# Patient Record
Sex: MALE | Race: WHITE | NOT HISPANIC OR LATINO | Employment: FULL TIME | URBAN - METROPOLITAN AREA
[De-identification: names, ages, dates, MRNs, and addresses within clinical notes are randomized per-mention and may not be internally consistent; named-entity substitution may affect disease eponyms.]

---

## 2023-03-13 DIAGNOSIS — F98.8 ATTENTION DEFICIT DISORDER (ADD) WITHOUT HYPERACTIVITY: Primary | ICD-10-CM

## 2023-03-13 RX ORDER — GABAPENTIN 300 MG/1
CAPSULE ORAL
COMMUNITY
Start: 2022-01-11 | End: 2023-08-14 | Stop reason: SDUPTHER

## 2023-03-13 RX ORDER — DEXTROAMPHETAMINE SACCHARATE, AMPHETAMINE ASPARTATE MONOHYDRATE, DEXTROAMPHETAMINE SULFATE AND AMPHETAMINE SULFATE 5; 5; 5; 5 MG/1; MG/1; MG/1; MG/1
20 CAPSULE, EXTENDED RELEASE ORAL DAILY
Qty: 30 CAPSULE | Refills: 0 | Status: SHIPPED | OUTPATIENT
Start: 2023-03-13 | End: 2023-04-12 | Stop reason: SDUPTHER

## 2023-03-13 RX ORDER — DEXTROAMPHETAMINE SACCHARATE, AMPHETAMINE ASPARTATE MONOHYDRATE, DEXTROAMPHETAMINE SULFATE AND AMPHETAMINE SULFATE 5; 5; 5; 5 MG/1; MG/1; MG/1; MG/1
1 CAPSULE, EXTENDED RELEASE ORAL DAILY
COMMUNITY
Start: 2023-02-09 | End: 2023-03-13 | Stop reason: SDUPTHER

## 2023-03-13 RX ORDER — BENZONATATE 100 MG/1
100 CAPSULE ORAL 3 TIMES DAILY PRN
COMMUNITY
Start: 2022-10-07 | End: 2023-11-08

## 2023-03-13 RX ORDER — HYDROCORTISONE 25 MG/G
CREAM TOPICAL
COMMUNITY
Start: 2021-05-20

## 2023-03-13 RX ORDER — KETOCONAZOLE 20 MG/ML
SHAMPOO, SUSPENSION TOPICAL
COMMUNITY
Start: 2023-02-27

## 2023-03-13 RX ORDER — DULOXETIN HYDROCHLORIDE 30 MG/1
30 CAPSULE, DELAYED RELEASE ORAL DAILY
COMMUNITY
End: 2023-04-28 | Stop reason: SDUPTHER

## 2023-03-13 RX ORDER — ROSUVASTATIN CALCIUM 10 MG/1
10 TABLET, COATED ORAL DAILY
COMMUNITY
End: 2024-01-05 | Stop reason: SDUPTHER

## 2023-04-12 DIAGNOSIS — F98.8 ATTENTION DEFICIT DISORDER (ADD) WITHOUT HYPERACTIVITY: ICD-10-CM

## 2023-04-12 RX ORDER — DEXTROAMPHETAMINE SACCHARATE, AMPHETAMINE ASPARTATE MONOHYDRATE, DEXTROAMPHETAMINE SULFATE AND AMPHETAMINE SULFATE 5; 5; 5; 5 MG/1; MG/1; MG/1; MG/1
20 CAPSULE, EXTENDED RELEASE ORAL DAILY
Qty: 30 CAPSULE | Refills: 0 | Status: SHIPPED | OUTPATIENT
Start: 2023-04-12 | End: 2023-04-14 | Stop reason: SDUPTHER

## 2023-04-12 RX ORDER — OXYCODONE AND ACETAMINOPHEN 5; 325 MG/1; MG/1
1 TABLET ORAL EVERY 6 HOURS
Qty: 20 TABLET | Refills: 0 | COMMUNITY
Start: 2023-03-31 | End: 2023-04-05

## 2023-04-14 DIAGNOSIS — F98.8 ATTENTION DEFICIT DISORDER (ADD) WITHOUT HYPERACTIVITY: ICD-10-CM

## 2023-04-14 RX ORDER — DEXTROAMPHETAMINE SACCHARATE, AMPHETAMINE ASPARTATE MONOHYDRATE, DEXTROAMPHETAMINE SULFATE AND AMPHETAMINE SULFATE 5; 5; 5; 5 MG/1; MG/1; MG/1; MG/1
20 CAPSULE, EXTENDED RELEASE ORAL DAILY
Qty: 30 CAPSULE | Refills: 0 | Status: SHIPPED | OUTPATIENT
Start: 2023-04-14 | End: 2023-05-12 | Stop reason: SDUPTHER

## 2023-04-28 ENCOUNTER — TELEPHONE (OUTPATIENT)
Dept: PRIMARY CARE | Facility: CLINIC | Age: 58
End: 2023-04-28
Payer: COMMERCIAL

## 2023-04-28 DIAGNOSIS — F41.9 ANXIETY AND DEPRESSION: Primary | ICD-10-CM

## 2023-04-28 DIAGNOSIS — F32.A ANXIETY AND DEPRESSION: Primary | ICD-10-CM

## 2023-04-28 RX ORDER — DULOXETIN HYDROCHLORIDE 30 MG/1
30 CAPSULE, DELAYED RELEASE ORAL DAILY
Qty: 90 CAPSULE | Refills: 3 | Status: SHIPPED | OUTPATIENT
Start: 2023-04-28 | End: 2023-05-03 | Stop reason: SDUPTHER

## 2023-05-03 DIAGNOSIS — F41.9 ANXIETY AND DEPRESSION: ICD-10-CM

## 2023-05-03 DIAGNOSIS — F32.A ANXIETY AND DEPRESSION: ICD-10-CM

## 2023-05-03 RX ORDER — DULOXETIN HYDROCHLORIDE 30 MG/1
30 CAPSULE, DELAYED RELEASE ORAL DAILY
Qty: 90 CAPSULE | Refills: 3 | Status: SHIPPED | OUTPATIENT
Start: 2023-05-03 | End: 2024-05-28 | Stop reason: SDUPTHER

## 2023-05-12 ENCOUNTER — TELEPHONE (OUTPATIENT)
Dept: PRIMARY CARE | Facility: CLINIC | Age: 58
End: 2023-05-12
Payer: COMMERCIAL

## 2023-05-12 DIAGNOSIS — F98.8 ATTENTION DEFICIT DISORDER (ADD) WITHOUT HYPERACTIVITY: ICD-10-CM

## 2023-05-12 RX ORDER — DEXTROAMPHETAMINE SACCHARATE, AMPHETAMINE ASPARTATE MONOHYDRATE, DEXTROAMPHETAMINE SULFATE AND AMPHETAMINE SULFATE 5; 5; 5; 5 MG/1; MG/1; MG/1; MG/1
20 CAPSULE, EXTENDED RELEASE ORAL DAILY
Qty: 15 CAPSULE | Refills: 0 | Status: SHIPPED | OUTPATIENT
Start: 2023-05-12 | End: 2023-06-01 | Stop reason: SDUPTHER

## 2023-05-12 NOTE — TELEPHONE ENCOUNTER
Pt called and made an appointment for his adderall renewal and he will be in on 5/25 at 4:45. Pt will run out of his adderall by then and would like to know if you could give him enough till his appointment

## 2023-05-24 PROBLEM — K64.4 EXTERNAL HEMORRHOIDS: Status: ACTIVE | Noted: 2023-05-24

## 2023-05-24 PROBLEM — K64.8 INTERNAL HEMORRHOIDS: Status: ACTIVE | Noted: 2023-05-24

## 2023-05-24 PROBLEM — G54.2 CERVICAL NEUROPATHY: Status: ACTIVE | Noted: 2023-05-24

## 2023-05-24 PROBLEM — B96.89 ACUTE BACTERIAL SINUSITIS: Status: ACTIVE | Noted: 2023-05-24

## 2023-05-24 PROBLEM — J01.90 ACUTE BACTERIAL SINUSITIS: Status: ACTIVE | Noted: 2023-05-24

## 2023-05-24 PROBLEM — S93.409A SPRAIN OF ANKLE: Status: ACTIVE | Noted: 2023-05-24

## 2023-05-24 PROBLEM — E78.5 HYPERLIPIDEMIA: Status: ACTIVE | Noted: 2023-05-24

## 2023-05-24 PROBLEM — F41.8 DEPRESSION WITH ANXIETY: Status: ACTIVE | Noted: 2023-05-24

## 2023-05-24 PROBLEM — J34.89 LESION OF NOSE: Status: ACTIVE | Noted: 2023-05-24

## 2023-05-24 PROBLEM — S39.012A STRAIN OF LUMBAR REGION: Status: ACTIVE | Noted: 2023-05-24

## 2023-05-24 PROBLEM — M75.42 IMPINGEMENT SYNDROME OF BOTH SHOULDERS: Status: ACTIVE | Noted: 2023-05-24

## 2023-05-24 PROBLEM — F98.8 ADD (ATTENTION DEFICIT DISORDER) WITHOUT HYPERACTIVITY: Status: ACTIVE | Noted: 2023-05-24

## 2023-05-24 PROBLEM — J02.9 SORE THROAT: Status: ACTIVE | Noted: 2023-05-24

## 2023-05-24 PROBLEM — M75.41 IMPINGEMENT SYNDROME OF BOTH SHOULDERS: Status: ACTIVE | Noted: 2023-05-24

## 2023-05-25 ENCOUNTER — APPOINTMENT (OUTPATIENT)
Dept: PRIMARY CARE | Facility: CLINIC | Age: 58
End: 2023-05-25
Payer: COMMERCIAL

## 2023-06-01 ENCOUNTER — OFFICE VISIT (OUTPATIENT)
Dept: PRIMARY CARE | Facility: CLINIC | Age: 58
End: 2023-06-01
Payer: COMMERCIAL

## 2023-06-01 VITALS
WEIGHT: 197.2 LBS | HEART RATE: 72 BPM | BODY MASS INDEX: 32.86 KG/M2 | HEIGHT: 65 IN | TEMPERATURE: 97.7 F | SYSTOLIC BLOOD PRESSURE: 130 MMHG | DIASTOLIC BLOOD PRESSURE: 78 MMHG | OXYGEN SATURATION: 97 %

## 2023-06-01 DIAGNOSIS — F98.8 ADD (ATTENTION DEFICIT DISORDER) WITHOUT HYPERACTIVITY: Primary | ICD-10-CM

## 2023-06-01 DIAGNOSIS — F98.8 ATTENTION DEFICIT DISORDER (ADD) WITHOUT HYPERACTIVITY: ICD-10-CM

## 2023-06-01 PROCEDURE — 99213 OFFICE O/P EST LOW 20 MIN: CPT | Performed by: FAMILY MEDICINE

## 2023-06-01 PROCEDURE — 80307 DRUG TEST PRSMV CHEM ANLYZR: CPT

## 2023-06-01 PROCEDURE — 1036F TOBACCO NON-USER: CPT | Performed by: FAMILY MEDICINE

## 2023-06-01 RX ORDER — ASPIRIN 81 MG/1
81 TABLET ORAL
COMMUNITY
End: 2023-11-08

## 2023-06-01 RX ORDER — DEXTROAMPHETAMINE SACCHARATE, AMPHETAMINE ASPARTATE MONOHYDRATE, DEXTROAMPHETAMINE SULFATE AND AMPHETAMINE SULFATE 5; 5; 5; 5 MG/1; MG/1; MG/1; MG/1
20 CAPSULE, EXTENDED RELEASE ORAL DAILY
Qty: 15 CAPSULE | Refills: 0 | Status: SHIPPED | OUTPATIENT
Start: 2023-06-01 | End: 2023-06-14 | Stop reason: SDUPTHER

## 2023-06-01 RX ORDER — FLUTICASONE PROPIONATE 50 MCG
1 SPRAY, SUSPENSION (ML) NASAL
COMMUNITY
End: 2023-11-08

## 2023-06-01 RX ORDER — DULOXETIN HYDROCHLORIDE 60 MG/1
60 CAPSULE, DELAYED RELEASE ORAL
COMMUNITY
End: 2023-11-08

## 2023-06-01 SDOH — ECONOMIC STABILITY: INCOME INSECURITY: IN THE LAST 12 MONTHS, WAS THERE A TIME WHEN YOU WERE NOT ABLE TO PAY THE MORTGAGE OR RENT ON TIME?: NO

## 2023-06-01 SDOH — ECONOMIC STABILITY: FOOD INSECURITY: WITHIN THE PAST 12 MONTHS, THE FOOD YOU BOUGHT JUST DIDN'T LAST AND YOU DIDN'T HAVE MONEY TO GET MORE.: NEVER TRUE

## 2023-06-01 SDOH — ECONOMIC STABILITY: FOOD INSECURITY: WITHIN THE PAST 12 MONTHS, YOU WORRIED THAT YOUR FOOD WOULD RUN OUT BEFORE YOU GOT MONEY TO BUY MORE.: NEVER TRUE

## 2023-06-01 SDOH — ECONOMIC STABILITY: HOUSING INSECURITY
IN THE LAST 12 MONTHS, WAS THERE A TIME WHEN YOU DID NOT HAVE A STEADY PLACE TO SLEEP OR SLEPT IN A SHELTER (INCLUDING NOW)?: NO

## 2023-06-01 SDOH — ECONOMIC STABILITY: TRANSPORTATION INSECURITY
IN THE PAST 12 MONTHS, HAS LACK OF TRANSPORTATION KEPT YOU FROM MEETINGS, WORK, OR FROM GETTING THINGS NEEDED FOR DAILY LIVING?: NO

## 2023-06-01 SDOH — ECONOMIC STABILITY: TRANSPORTATION INSECURITY
IN THE PAST 12 MONTHS, HAS THE LACK OF TRANSPORTATION KEPT YOU FROM MEDICAL APPOINTMENTS OR FROM GETTING MEDICATIONS?: NO

## 2023-06-01 ASSESSMENT — ENCOUNTER SYMPTOMS
DEPRESSION: 0
LOSS OF SENSATION IN FEET: 0
OCCASIONAL FEELINGS OF UNSTEADINESS: 0
SLEEP DISTURBANCE: 1

## 2023-06-01 ASSESSMENT — LIFESTYLE VARIABLES
AUDIT-C TOTAL SCORE: 0
HOW MANY STANDARD DRINKS CONTAINING ALCOHOL DO YOU HAVE ON A TYPICAL DAY: PATIENT DOES NOT DRINK
SKIP TO QUESTIONS 9-10: 1
HOW OFTEN DO YOU HAVE SIX OR MORE DRINKS ON ONE OCCASION: NEVER
HOW OFTEN DO YOU HAVE A DRINK CONTAINING ALCOHOL: NEVER

## 2023-06-01 ASSESSMENT — SOCIAL DETERMINANTS OF HEALTH (SDOH)
WITHIN THE LAST YEAR, HAVE YOU BEEN AFRAID OF YOUR PARTNER OR EX-PARTNER?: NO
WITHIN THE LAST YEAR, HAVE YOU BEEN HUMILIATED OR EMOTIONALLY ABUSED IN OTHER WAYS BY YOUR PARTNER OR EX-PARTNER?: NO
WITHIN THE LAST YEAR, HAVE TO BEEN RAPED OR FORCED TO HAVE ANY KIND OF SEXUAL ACTIVITY BY YOUR PARTNER OR EX-PARTNER?: NO
WITHIN THE LAST YEAR, HAVE YOU BEEN KICKED, HIT, SLAPPED, OR OTHERWISE PHYSICALLY HURT BY YOUR PARTNER OR EX-PARTNER?: NO
HOW HARD IS IT FOR YOU TO PAY FOR THE VERY BASICS LIKE FOOD, HOUSING, MEDICAL CARE, AND HEATING?: NOT HARD AT ALL

## 2023-06-01 ASSESSMENT — PAIN SCALES - GENERAL: PAINLEVEL: 6

## 2023-06-01 ASSESSMENT — PATIENT HEALTH QUESTIONNAIRE - PHQ9
2. FEELING DOWN, DEPRESSED OR HOPELESS: NOT AT ALL
1. LITTLE INTEREST OR PLEASURE IN DOING THINGS: NOT AT ALL
SUM OF ALL RESPONSES TO PHQ9 QUESTIONS 1 & 2: 0

## 2023-06-01 NOTE — PROGRESS NOTES
Subjective   Patient ID: Sivakumar Hall is a 58 y.o. male who presents for No chief complaint on file..   3 month med refills  HPI     Review of Systems   Psychiatric/Behavioral:  Positive for sleep disturbance.         ADD       Objective   There were no vitals taken for this visit.    Physical Exam  Vitals and nursing note reviewed.   Skin:     Findings: No lesion.   Neurological:      General: No focal deficit present.   Psychiatric:         Mood and Affect: Mood normal.         Behavior: Behavior normal.         Assessment/Plan   Problem List Items Addressed This Visit          Other    ADD (attention deficit disorder) without hyperactivity - Primary        OARRS:  No data recorded  I have personally reviewed the OARRS report for Siavkumar Hall. I have considered the risks of abuse, dependence, addiction and diversion    Is the patient prescribed a combination of a benzodiazepine and opioid?  No    Last Urine Drug Screen / ordered today: Yes  Recent Results (from the past 77388 hour(s))   Amphetamine Confirm, Urine    Collection Time: 05/20/21  4:31 PM   Result Value Ref Range    Amphetamines,Urine 2,183 ng/mL    MDA, Urine <200 ng/mL    MDEA, Urine <200 ng/mL    MDMA, Urine <200 ng/mL    Methamphetamine Quant, Ur <200 ng/mL    Phentermine,Urine <200 ng/mL   Drug Screen, Urine With Reflex to Confirmation    Collection Time: 05/20/21  4:31 PM   Result Value Ref Range    DRUG SCREEN COMMENT URINE SEE BELOW     Amphetamine Screen, Urine PRESUMPTIVE POSITIVE (A) NEGATIVE    Barbiturate Screen, Urine PRESUMPTIVE NEGATIVE NEGATIVE    BENZODIAZEPINE (PRESENCE) IN URINE BY SCREEN METHOD PRESUMPTIVE NEGATIVE NEGATIVE    Cannabinoid Screen, Urine PRESUMPTIVE NEGATIVE NEGATIVE    Cocaine Screen, Urine PRESUMPTIVE NEGATIVE NEGATIVE    Fentanyl, Ur PRESUMPTIVE NEGATIVE NEGATIVE    Methadone Screen, Urine PRESUMPTIVE NEGATIVE NEGATIVE    Opiate Screen, Urine PRESUMPTIVE NEGATIVE NEGATIVE    Oxycodone Screen, Ur PRESUMPTIVE  NEGATIVE NEGATIVE    PCP Screen, Urine PRESUMPTIVE NEGATIVE NEGATIVE     N/A    Controlled Substance Agreement:  Date of the Last Agreement: 6/1/23  Reviewed Controlled Substance Agreement including but not limited to the benefits, risks, and alternatives to treatment with a Controlled Substance medication(s).    Stimulants:   What is the patient's goal of therapy? ADD  Is this being achieved with current treatment? yes    Activities of Daily Living:   Is your overall impression that this patient is benefiting (symptom reduction outweighs side effects) from stimulant therapy? Yes     1. Physical Functioning: Better  2. Family Relationship: Better  3. Social Relationship: Better  4. Mood: Better  5. Sleep Patterns: Better  6. Overall Function: Better

## 2023-06-02 LAB
AMPHETAMINE (PRESENCE) IN URINE BY SCREEN METHOD: NORMAL
BARBITURATES PRESENCE IN URINE BY SCREEN METHOD: NORMAL
BENZODIAZEPINE (PRESENCE) IN URINE BY SCREEN METHOD: NORMAL
CANNABINOIDS IN URINE BY SCREEN METHOD: NORMAL
COCAINE (PRESENCE) IN URINE BY SCREEN METHOD: NORMAL
DRUG SCREEN COMMENT URINE: NORMAL
FENTANYL URINE: NORMAL
METHADONE (PRESENCE) IN URINE BY SCREEN METHOD: NORMAL
OPIATES (PRESENCE) IN URINE BY SCREEN METHOD: NORMAL
OXYCODONE (PRESENCE) IN URINE BY SCREEN METHOD: NORMAL
PHENCYCLIDINE (PRESENCE) IN URINE BY SCREEN METHOD: NORMAL

## 2023-06-14 DIAGNOSIS — F98.8 ATTENTION DEFICIT DISORDER (ADD) WITHOUT HYPERACTIVITY: ICD-10-CM

## 2023-06-15 RX ORDER — DEXTROAMPHETAMINE SACCHARATE, AMPHETAMINE ASPARTATE MONOHYDRATE, DEXTROAMPHETAMINE SULFATE AND AMPHETAMINE SULFATE 5; 5; 5; 5 MG/1; MG/1; MG/1; MG/1
20 CAPSULE, EXTENDED RELEASE ORAL DAILY
Qty: 30 CAPSULE | Refills: 0 | Status: SHIPPED | OUTPATIENT
Start: 2023-06-15 | End: 2023-08-18 | Stop reason: SDUPTHER

## 2023-07-27 RX ORDER — INFLUENZA VIRUS VACCINE 15; 15; 15; 15 UG/.5ML; UG/.5ML; UG/.5ML; UG/.5ML
SUSPENSION INTRAMUSCULAR
COMMUNITY
Start: 2022-10-23 | End: 2024-05-13

## 2023-07-27 RX ORDER — BNT162B2 ORIGINAL AND OMICRON BA.4/BA.5 .1125; .1125 MG/2.25ML; MG/2.25ML
INJECTION, SUSPENSION INTRAMUSCULAR
COMMUNITY
Start: 2022-10-23 | End: 2024-04-09

## 2023-08-14 DIAGNOSIS — M51.26 HERNIATED LUMBAR INTERVERTEBRAL DISC: Primary | ICD-10-CM

## 2023-08-14 RX ORDER — GABAPENTIN 300 MG/1
300 CAPSULE ORAL NIGHTLY
Qty: 90 CAPSULE | Refills: 0 | Status: SHIPPED | OUTPATIENT
Start: 2023-08-14 | End: 2023-11-14 | Stop reason: SDUPTHER

## 2023-08-18 ENCOUNTER — TELEPHONE (OUTPATIENT)
Dept: PRIMARY CARE | Facility: CLINIC | Age: 58
End: 2023-08-18

## 2023-08-18 ENCOUNTER — OFFICE VISIT (OUTPATIENT)
Dept: PRIMARY CARE | Facility: CLINIC | Age: 58
End: 2023-08-18
Payer: COMMERCIAL

## 2023-08-18 VITALS
HEART RATE: 61 BPM | HEIGHT: 65 IN | TEMPERATURE: 97.7 F | WEIGHT: 200.2 LBS | DIASTOLIC BLOOD PRESSURE: 72 MMHG | SYSTOLIC BLOOD PRESSURE: 122 MMHG | OXYGEN SATURATION: 98 % | BODY MASS INDEX: 33.36 KG/M2

## 2023-08-18 DIAGNOSIS — F98.8 ATTENTION DEFICIT DISORDER (ADD) WITHOUT HYPERACTIVITY: ICD-10-CM

## 2023-08-18 DIAGNOSIS — M48.02 CERVICAL STENOSIS OF SPINAL CANAL: ICD-10-CM

## 2023-08-18 DIAGNOSIS — M48.061 SPINAL STENOSIS OF LUMBAR REGION WITHOUT NEUROGENIC CLAUDICATION: ICD-10-CM

## 2023-08-18 DIAGNOSIS — F98.8 ADD (ATTENTION DEFICIT DISORDER) WITHOUT HYPERACTIVITY: Primary | ICD-10-CM

## 2023-08-18 PROCEDURE — 99214 OFFICE O/P EST MOD 30 MIN: CPT | Performed by: FAMILY MEDICINE

## 2023-08-18 PROCEDURE — 1036F TOBACCO NON-USER: CPT | Performed by: FAMILY MEDICINE

## 2023-08-18 RX ORDER — DEXTROAMPHETAMINE SACCHARATE, AMPHETAMINE ASPARTATE MONOHYDRATE, DEXTROAMPHETAMINE SULFATE AND AMPHETAMINE SULFATE 5; 5; 5; 5 MG/1; MG/1; MG/1; MG/1
20 CAPSULE, EXTENDED RELEASE ORAL DAILY
Qty: 30 CAPSULE | Refills: 0 | Status: SHIPPED | OUTPATIENT
Start: 2023-08-18 | End: 2023-08-18 | Stop reason: SDUPTHER

## 2023-08-18 RX ORDER — DEXTROAMPHETAMINE SACCHARATE, AMPHETAMINE ASPARTATE MONOHYDRATE, DEXTROAMPHETAMINE SULFATE AND AMPHETAMINE SULFATE 5; 5; 5; 5 MG/1; MG/1; MG/1; MG/1
20 CAPSULE, EXTENDED RELEASE ORAL DAILY
Qty: 30 CAPSULE | Refills: 0 | Status: SHIPPED | OUTPATIENT
Start: 2023-08-18 | End: 2023-09-25 | Stop reason: SDUPTHER

## 2023-08-18 ASSESSMENT — ENCOUNTER SYMPTOMS
BACK PAIN: 1
NECK PAIN: 1

## 2023-08-18 ASSESSMENT — PAIN SCALES - GENERAL: PAINLEVEL: 10-WORST PAIN EVER

## 2023-08-18 NOTE — TELEPHONE ENCOUNTER
Pt. Was just in & had refill on adderall 20 mg.  & [facility] did not have.  Can you please send to [facility] [phone]  ty

## 2023-08-18 NOTE — PROGRESS NOTES
"Subjective   Patient ID: Sivakumar Hall is a 58 y.o. male who presents for Follow-up (Follow up adhd med refills).   3 month med refills  HPI     Review of Systems   Musculoskeletal:  Positive for back pain and neck pain.   Psychiatric/Behavioral:          ADD       Objective   /72 (BP Location: Left arm)   Pulse 61   Temp 36.5 °C (97.7 °F) (Temporal)   Ht 1.651 m (5' 5\")   Wt 90.8 kg (200 lb 3.2 oz)   SpO2 98%   BMI 33.32 kg/m²     Physical Exam  Vitals and nursing note reviewed.   Musculoskeletal:      Comments: Cervical and lumbar stenosis.  Previous history of cervical spine surgery.   Neurological:      General: No focal deficit present.      Mental Status: He is oriented to person, place, and time.   Psychiatric:         Mood and Affect: Mood normal.         Behavior: Behavior normal.         Assessment/Plan   Problem List Items Addressed This Visit       ADD (attention deficit disorder) without hyperactivity - Primary     Other Visit Diagnoses       Cervical stenosis of spinal canal        Relevant Orders    XR cervical spine complete 4-5 views    Referral to Physical Therapy    Spinal stenosis of lumbar region without neurogenic claudication        Relevant Orders    XR lumbar spine complete 4+ views    Referral to Physical Therapy    Attention deficit disorder (ADD) without hyperactivity        Relevant Medications    amphetamine-dextroamphetamine XR (Adderall XR) 20 mg 24 hr capsule             OARRS:  Solo Andre DO on 8/18/2023 11:12 AM  I have personally reviewed the OARRS report for Sivakumar Hall. I have considered the risks of abuse, dependence, addiction and diversion    Is the patient prescribed a combination of a benzodiazepine and opioid?  No    Last Urine Drug Screen / ordered today: No  Recent Results (from the past 42438 hour(s))   Drug Screen, Urine With Reflex to Confirmation    Collection Time: 06/01/23  4:24 PM   Result Value Ref Range    DRUG SCREEN COMMENT URINE " SEE BELOW     Amphetamine Screen, Urine PRESUMPTIVE NEGATIVE NEGATIVE    Barbiturate Screen, Urine PRESUMPTIVE NEGATIVE NEGATIVE    BENZODIAZEPINE (PRESENCE) IN URINE BY SCREEN METHOD PRESUMPTIVE NEGATIVE NEGATIVE    Cannabinoid Screen, Urine PRESUMPTIVE NEGATIVE NEGATIVE    Cocaine Screen, Urine PRESUMPTIVE NEGATIVE NEGATIVE    Fentanyl, Ur PRESUMPTIVE NEGATIVE NEGATIVE    Methadone Screen, Urine PRESUMPTIVE NEGATIVE NEGATIVE    Opiate Screen, Urine PRESUMPTIVE NEGATIVE NEGATIVE    Oxycodone Screen, Ur PRESUMPTIVE NEGATIVE NEGATIVE    PCP Screen, Urine PRESUMPTIVE NEGATIVE NEGATIVE     Results are as expected.     Controlled Substance Agreement:  Date of the Last Agreement: 6/1/23  Reviewed Controlled Substance Agreement including but not limited to the benefits, risks, and alternatives to treatment with a Controlled Substance medication(s).    Stimulants:   What is the patient's goal of therapy? ADD  Is this being achieved with current treatment? yes    Activities of Daily Living:   Is your overall impression that this patient is benefiting (symptom reduction outweighs side effects) from stimulant therapy? Yes     1. Physical Functioning: Better  2. Family Relationship: Better  3. Social Relationship: Better  4. Mood: Better  5. Sleep Patterns: Better  6. Overall Function: Better

## 2023-09-12 NOTE — RESULT ENCOUNTER NOTE
Spoke with pt and pt states that his neck is killing him and that he does have PT appt and his back is not as bad.

## 2023-09-25 DIAGNOSIS — F98.8 ATTENTION DEFICIT DISORDER (ADD) WITHOUT HYPERACTIVITY: ICD-10-CM

## 2023-09-25 RX ORDER — DEXTROAMPHETAMINE SACCHARATE, AMPHETAMINE ASPARTATE MONOHYDRATE, DEXTROAMPHETAMINE SULFATE AND AMPHETAMINE SULFATE 5; 5; 5; 5 MG/1; MG/1; MG/1; MG/1
20 CAPSULE, EXTENDED RELEASE ORAL DAILY
Qty: 30 CAPSULE | Refills: 0 | Status: SHIPPED | OUTPATIENT
Start: 2023-09-25 | End: 2023-10-06 | Stop reason: SDUPTHER

## 2023-10-04 ENCOUNTER — APPOINTMENT (OUTPATIENT)
Dept: PHYSICAL THERAPY | Facility: CLINIC | Age: 58
End: 2023-10-04
Payer: COMMERCIAL

## 2023-10-06 DIAGNOSIS — F98.8 ATTENTION DEFICIT DISORDER (ADD) WITHOUT HYPERACTIVITY: ICD-10-CM

## 2023-10-06 RX ORDER — DEXTROAMPHETAMINE SACCHARATE, AMPHETAMINE ASPARTATE MONOHYDRATE, DEXTROAMPHETAMINE SULFATE AND AMPHETAMINE SULFATE 5; 5; 5; 5 MG/1; MG/1; MG/1; MG/1
20 CAPSULE, EXTENDED RELEASE ORAL DAILY
Qty: 30 CAPSULE | Refills: 0 | Status: SHIPPED | OUTPATIENT
Start: 2023-10-06 | End: 2023-11-14 | Stop reason: SDUPTHER

## 2023-10-12 ENCOUNTER — TREATMENT (OUTPATIENT)
Dept: PHYSICAL THERAPY | Facility: CLINIC | Age: 58
End: 2023-10-12
Payer: COMMERCIAL

## 2023-10-12 DIAGNOSIS — M54.2 NECK PAIN: Primary | ICD-10-CM

## 2023-10-12 DIAGNOSIS — M54.50 CHRONIC MIDLINE LOW BACK PAIN WITHOUT SCIATICA: Chronic | ICD-10-CM

## 2023-10-12 DIAGNOSIS — G89.29 CHRONIC MIDLINE LOW BACK PAIN WITHOUT SCIATICA: Chronic | ICD-10-CM

## 2023-10-12 PROCEDURE — 97110 THERAPEUTIC EXERCISES: CPT | Mod: GP

## 2023-10-12 PROCEDURE — 97140 MANUAL THERAPY 1/> REGIONS: CPT | Mod: GP

## 2023-10-12 ASSESSMENT — PAIN - FUNCTIONAL ASSESSMENT: PAIN_FUNCTIONAL_ASSESSMENT: 0-10

## 2023-10-12 NOTE — PROGRESS NOTES
Physical Therapy    Physical Therapy Treatment    Patient Name: Sivakumar Hall  MRN: [id number]  Today's Date: 10/12/2023  Time Calculation  Start Time: 0833  Stop Time: 0915  Time Calculation (min): 42 min  Visit Number: 2  Insurance: [facility]   Authorization required after evaluation   20V PCY, 0 used at initial eval     Assessment:  Decreased global lumbar ROM present in all directions. Global LE strength WFL. Core strengthening initiated this session to provide lumbopelvic stabilization to the spine. Decreased core endurance and difficulty functionally jb TrA. Pt able to functionally contract TrA during box lifts with extended time and reps- mild reduction in low back pain. Increased muscle tone present in miriam upper traps- (+) response to manual therapy with reduction in pain to 6.5/10 at end of session. Mild progress towards PT goals. Pt continues to require skilled physical therapy services to reduce neck and back pain limiting job as .     Plan: Progress with POC and goals from [employer] system, as tolerated. Monitor home program.       Current Problem  1. Neck pain        2. Chronic midline low back pain without sciatica            Subjective   Pt reports no change in neck or back pain since initial eval. HEP limited by recent illness. No falls or other changes in status.    Precautions  Precautions  Precautions Comment: (+) basal cell CA (in remission s/p removal 6 months ago). Patient denies pacemaker, DM, blood thinners, latex allergy, epilepsy/seizures, or other known cardio/neuro/pulmonary problems. Not a fall risk    Pain  Pain Assessment: 0-10  Pain Score:  (8/10 neck and 6/10 low back)    Objective   Extremity/Trunk Assessment  Strength RLE  RLE Overall Strength: Within Functional Limits - strength 5/5  R Hip Flexion: 5/5  R Hip Extension: 5/5  R Hip ABduction: 5/5  R Hip ADduction: 5/5  R Knee Flexion: 5/5  R Knee Extension: 5/5  R Ankle Dorsiflexion: 5/5  R Ankle Plantar  Flexion: 5/5  Strength LLE  LLE Overall Strength: Within Functional Limits - strength 5/5  L Hip Flexion: 5/5  L Hip Extension: 5/5  L Hip ABduction: 5/5  L Hip ADduction: 5/5  L Knee Flexion: 5/5  L Knee Extension: 5/5  L Ankle Dorsiflexion: 5/5  L Ankle Plantar Flexion: 5/5    Lumbar ROM: limited in all directions    Treatments:  Therapeutic Exercise  Therapeutic Exercise Activity 1: Dead bugs, 2x10 each leg  Therapeutic Exercise Activity 2: Supine lower trunk rotations, 2x10  Therapeutic Exercise Activity 3: Bear plank, 1x8 with 5 sec hold  Therapeutic Exercise Activity 4: Child's pose, 1x20 sec  Therapeutic Exercise Activity 5: TrA box lifts (12.5#), 1x3    Manual Therapy  Manual Therapy Activity 1: Soft tissue mobilization over miriam upper traps using Hawk tool. Performed with pt in seated position. 1x13 min    OP EDUCATION: UPDATED HEP  Access Code: [email or web address]  URL: [email or web address]  Date: 10/12/2023  Prepared by: Cathy Sullivan    Exercises  - Seated Shoulder Row with Resistance Anchored at Feet  - 1 x daily - 7 x weekly - 3 sets - 10 reps  - Seated Shoulder Horizontal Abduction with Resistance  - 1 x daily - 7 x weekly - 3 sets - 10 reps  - Seated Cervical Sidebending Stretch  - 1 x daily - 7 x weekly - 3 sets - 3 reps - 10 sec hold  - Supine Lower Trunk Rotation  - 1 x daily - 7 x weekly - 2 sets - 10 reps  - Bear Plank from Quadruped  - 1 x daily - 7 x weekly - 1-2 sets - 8 reps - 5 sec hold  - Dead Bug  - 1 x daily - 7 x weekly - 2 sets - 10 reps

## 2023-10-19 ENCOUNTER — TREATMENT (OUTPATIENT)
Dept: PHYSICAL THERAPY | Facility: CLINIC | Age: 58
End: 2023-10-19
Payer: COMMERCIAL

## 2023-10-19 DIAGNOSIS — M54.2 NECK PAIN: Primary | ICD-10-CM

## 2023-10-19 DIAGNOSIS — M54.50 CHRONIC MIDLINE LOW BACK PAIN WITHOUT SCIATICA: Chronic | ICD-10-CM

## 2023-10-19 DIAGNOSIS — G89.29 CHRONIC MIDLINE LOW BACK PAIN WITHOUT SCIATICA: Chronic | ICD-10-CM

## 2023-10-19 PROCEDURE — 97140 MANUAL THERAPY 1/> REGIONS: CPT | Mod: GP

## 2023-10-19 PROCEDURE — 97110 THERAPEUTIC EXERCISES: CPT | Mod: GP

## 2023-10-19 NOTE — PROGRESS NOTES
Physical Therapy  Physical Therapy Treatment Note    Patient Name: Sivakumar Hall  MRN: [id number]  Today's Date: 10/19/2023  Time Calculation  Start Time: 0920  Stop Time: 1000  Time Calculation (min): 40 min    Insurance:  Visit Number: 3  Insurance: [facility]   Auth: eval + 4V from 9/20/23-10/19/23  20V PCY, 0 used at initial eval       Assessment:   Re-eval performed today after patient has attended 3V (eval + 2 follow-up appointments) for chronic neck and low back pain. Therapy attendance was limited by illness. Assessment revealed mild improvements in miriam cervical rot, postural awareness, and ability to functionally brace core while lifting since initial eval. Impairments in cervical ROM (> ext and miriam SB), numbness & tingling to BUE, posture, and decreased standing & gait endurance remain at this time. These impairments continue to cause difficulty with lifting objects, gripping objects, driving, and standing at work. Skilled physical therapy services continue to be required (1x week for 6 weeks) to safely address previously stated impairments and return patient to PLOF with reduced pain.      Plan:  Progress with POC and goals from [employer] system, as tolerated. Monitor home program.       Current Problem  1. Neck pain        2. Chronic midline low back pain without sciatica            Precautions: (+) basal cell CA (in remission s/p removal 6 months ago). Patient denies pacemaker, DM, blood thinners, latex allergy, epilepsy/seizures, or other known cardio/neuro/pulmonary problems. Not a fall risk.    Subjective: Patient reports no change in neck or back pain since last session. Driving continues to exacerbate N/T into BUE. No falls or other changes in status.    Pain. 7/5/10 neck and 6/10 back    Performing HEP?: Yes     Objective  Modified SAMUEL: 18% (9/50)  LE MMTs and lumbar ROM screen not performed- assessed last visit  Tay testing prioritized vs ROM measurements to gather further information-  see below    Treatment Performed:  Therapeutic Exercise x 29 minutes, 2 units  Tay Directional Preference: cervical ROM performed 10x in each direction  Flex: exacerbated pain, no change in sx  Ext: exacerbated pain, no change in sx  L SB: exacerbated pain and tingling into L shoulder  R SB: exacerbated pain, no change in sx  L rot: no effect on pain, no change in pain  R rot: no effect on pain, no change in sx  Resisted lat pulldown YTB, 3x10  Shoulder ext to neutral blue cords, 3x10  Bent over rows using 10# DB, 3x8  Re-assessment: see subjective, objective, assessment, and updated goals    Manual Therapy x 11 minutes, 1 unit   Soft tissue mobilization over miriam upper traps using Hawk tool. Performed with pt in seated position. 1x7 mins  Intermittent cervical distraction, 4x1 min. Performed with pt in seated position, therapist using hands    Goals  STG (to be achieved in 4 weeks)  1) Pt will independently perform HEP as assigned to promote self-management of symptoms and continue making functional gains outside of physical therapy.  10/19/23: GOAL MET  2) Pt will report BUE numbness and tingling no >2x per day.   10/19/23: NO CHANGE  3) Pt wll report a reduction low back pain by at least 2/10 daily.    10/19/23: NO CHANGE  LTG (to be achieved by discharge)  1) Pt will improve miriam cervical SB and rot by at least 5 deg to assist with head turns required for driving.  10/19/23: PROGRESSING- mild improvement in miriam rot, no change in miriam SB  2) Pt will report no numbness or tingling with driving.  10/19/23: NO CHANGE  3) Pt will be able to carry 25# box at least 30 feet with no low back pain or BUE radicular sx to assist with carrying cases of alcohol at work.  10/19/23: NOT ASSESSED THIS VISIT  4) Pt will improve standing and gait endurance to at least 1 hour with 0/10 low back pain to assist with working job as a .   10/19/23: PROGRESSING- demonstrated 10 min standing tolerance during session without  increase in low back pain  5) Pt will reduce NDI by at least 7.5 points (MCID) to demonstrate reduced neck pain during ADLS/IADLs.   10/19/23: NOT ASSESSED THIS VISIT      Assigned HEP  Pt instructed to discontinue any exercise that increases pain > 2/10 baseline. Pt verbalized understanding.    Access Code: [email or web address]  URL: [email or web address]  Date: 10/12/2023  Prepared by: Cathy Sullivan     Exercises  - Seated Shoulder Row with Resistance Anchored at Feet  - 1 x daily - 7 x weekly - 3 sets - 10 reps  - Seated Shoulder Horizontal Abduction with Resistance  - 1 x daily - 7 x weekly - 3 sets - 10 reps  - Seated Cervical Sidebending Stretch  - 1 x daily - 7 x weekly - 3 sets - 3 reps - 10 sec hold  - Supine Lower Trunk Rotation  - 1 x daily - 7 x weekly - 2 sets - 10 reps  - Bear Plank from Quadruped  - 1 x daily - 7 x weekly - 1-2 sets - 8 reps - 5 sec hold  - Dead Bug  - 1 x daily - 7 x weekly - 2 sets - 10 reps    Cathy Sullivan, PT

## 2023-10-23 ENCOUNTER — TELEPHONE (OUTPATIENT)
Dept: PHYSICAL THERAPY | Facility: CLINIC | Age: 58
End: 2023-10-23
Payer: COMMERCIAL

## 2023-10-30 ENCOUNTER — TREATMENT (OUTPATIENT)
Dept: PHYSICAL THERAPY | Facility: CLINIC | Age: 58
End: 2023-10-30
Payer: COMMERCIAL

## 2023-10-30 DIAGNOSIS — M54.2 NECK PAIN: Primary | ICD-10-CM

## 2023-10-30 DIAGNOSIS — M54.9 DORSALGIA, UNSPECIFIED: ICD-10-CM

## 2023-10-30 DIAGNOSIS — M54.50 CHRONIC MIDLINE LOW BACK PAIN WITHOUT SCIATICA: Chronic | ICD-10-CM

## 2023-10-30 DIAGNOSIS — G89.29 CHRONIC MIDLINE LOW BACK PAIN WITHOUT SCIATICA: Chronic | ICD-10-CM

## 2023-10-30 DIAGNOSIS — M54.2 CERVICALGIA: ICD-10-CM

## 2023-10-30 PROCEDURE — 97110 THERAPEUTIC EXERCISES: CPT | Mod: GP

## 2023-10-30 NOTE — PROGRESS NOTES
Physical Therapy  Physical Therapy Treatment Note    Patient Name: Sivakumar Hall  MRN: [id number]  Today's Date: 10/30/2023  Time Calculation  Start Time: 0816  Stop Time: 0856  Time Calculation (min): 40 min    Insurance:  Visit Number: 4  Insurance: [employer] [facility]   Initial auth: eval + 4V from 9/20/23-10/19/23 (only used 2V due to illness)  Re-auth: 4V from 10/23/23-11/21/23  20V PCY, 0 used at initial eval        Assessment:   Supine cervical retraction produced numbness into L deltoid- no change in sx with seated retraction. Limitations in cervical mobility > thoracic. Decreased endurance present during prone postural strengthening. No evident position of sx relief. Limited progress towards established goals. Pt continues to require skilled physical therapy services to reduce neck and back pain limiting job as .        Plan:  Progress with POC, as tolerated. Monitor home program.         Current Problem  1. Neck pain        2. Cervicalgia  Follow Up In Physical Therapy      3. Dorsalgia, unspecified  Follow Up In Physical Therapy      4. Chronic midline low back pain without sciatica            Precautions: (+) basal cell CA (in remission s/p removal 6 months ago). Patient denies pacemaker, DM, blood thinners, latex allergy, epilepsy/seizures, or other known cardio/neuro/pulmonary problems. Not a fall risk.       Subjective:  Patient continues to report increased neck pain with all head movements and increased low back pain with sitting for extended periods of time. No falls or other changes in status.     Pain: 8/10 neck and 6/10 back       Performing HEP?: Yes      Treatment Performed:    Therapeutic Exercise:  40 minutes  3 units  Prone I's using 1#DB, 3x8 each arm  Prone T's using 1# DB, 3x8 each arm  Prone Y's using 1# DB, 3x8 each arm  Supine deep neck flexor training, 3x8 with 5 sec hold  Supine cervical retraction, 1x3 *discontinued due to increase in sx*  Cervical rotation SNAGs, 1x7  *discontinued due to no benefit*  Seated thoracic rotation, 3x8 each side   Seated cervical retraction isometric using ball, 3x8 with 5 sec hold    Assigned HEP  Pt instructed to discontinue any exercise that increases pain > 2/10 baseline. Pt verbalized understanding.    Access Code: [email or web address]  URL: [email or web address]  Date: 10/30/2023  Prepared by: Cathy Sullivan    Exercises  - Seated Shoulder Row with Resistance Anchored at Feet  - 1 x daily - 7 x weekly - 3 sets - 8 reps  - Quadruped Thoracic Rotation Full Range with Hand on Neck  - 1 x daily - 7 x weekly - 3 sets - 8 reps  - Supine Deep Neck Flexor Training  - 1 x daily - 7 x weekly - 3 sets - 8 reps - 5 sec hold  - Standing Isometric Cervical Retraction with Chin Tucks and Ball at Wall  - 1 x daily - 7 x weekly - 3 sets - 8 reps - 5 sec hold  - Seated Shoulder Horizontal Abduction with Resistance  - 1 x daily - 7 x weekly - 3 sets - 8 reps  - Supine Lower Trunk Rotation  - 1 x daily - 7 x weekly - 2 sets - 10 reps  - Bear Plank from Quadruped  - 1 x daily - 7 x weekly - 1-2 sets - 8 reps - 5 sec hold  - Dead Bug  - 1 x daily - 7 x weekly - 2 sets - 10 reps    Cathy Sullivan, PT

## 2023-11-08 ENCOUNTER — TREATMENT (OUTPATIENT)
Dept: PHYSICAL THERAPY | Facility: CLINIC | Age: 58
End: 2023-11-08
Payer: COMMERCIAL

## 2023-11-08 DIAGNOSIS — M54.9 DORSALGIA, UNSPECIFIED: ICD-10-CM

## 2023-11-08 DIAGNOSIS — M54.50 CHRONIC MIDLINE LOW BACK PAIN WITHOUT SCIATICA: Chronic | ICD-10-CM

## 2023-11-08 DIAGNOSIS — G89.29 CHRONIC MIDLINE LOW BACK PAIN WITHOUT SCIATICA: Chronic | ICD-10-CM

## 2023-11-08 DIAGNOSIS — M54.2 CERVICALGIA: ICD-10-CM

## 2023-11-08 DIAGNOSIS — M54.2 NECK PAIN: Primary | ICD-10-CM

## 2023-11-08 PROCEDURE — 97110 THERAPEUTIC EXERCISES: CPT | Mod: GP

## 2023-11-08 NOTE — PROGRESS NOTES
Physical Therapy  Physical Therapy Treatment Note    Patient Name: Sivakumar Hall  MRN: [id number]  Today's Date: 11/8/2023  Time Calculation  Start Time: 0947  Stop Time: 1027  Time Calculation (min): 40 min    Insurance:  Visit Number: 5  Insurance: [employer] [facility]   Initial auth: eval + 4V from 9/20/23-10/19/23 (only used 2V due to illness)  Re-auth: 4V from 10/23/23-11/21/23  20V PCY, 0 used at initial eval          Assessment:   No increase in pain during all cervical strengthening. Max cervical ROM in miriam SB, flexion, and extension (not miriam rotation) exacerbates pain. Min verbal cueing to lift head out of cervical flexion during postural strengthening exercises. Pt voiced mild low back relief with lumbar flexion based exercises. Limited progress towards established goals. Pt continues to require skilled physical therapy services to reduce neck and back pain limiting job as .       Plan: Progress with POC, as tolerated. Monitor home program.         Current Problem  1. Neck pain        2. Cervicalgia  Follow Up In Physical Therapy      3. Dorsalgia, unspecified  Follow Up In Physical Therapy      4. Chronic midline low back pain without sciatica            Precautions: (+) basal cell CA (in remission s/p removal 6 months ago). Patient denies pacemaker, DM, blood thinners, latex allergy, epilepsy/seizures, or other known cardio/neuro/pulmonary problems. Not a fall risk.        Subjective: Patient reports bracing core with lifting at work has become easier. Neck pain remains unchanged. No falls or other changes in status.     Pain: 7/10 neck and 5/10 low back      Performing HEP?: Partially- reports performing 4-5x week      Treatment Performed:  Therapeutic Exercise:  40 minutes  3 units  Standing shoulder ext to neutral using BTB, 3x10  Supine deep cervical flexion, 3x10 with 5 sec hold  Cervical retraction using RTB, 3x10  Cervical flexion using RTB (therapist anchoring), 3x10  Cervical SB isometric, 3x10  with 5 sec hold each side  Seated lumbar flexion using swiss ball, 3x10  Modified child's pose seated using swiss ball, 4x10 sec hold    Assigned HEP  Pt instructed to discontinue any exercise that increases pain > 2/10 baseline. Pt verbalized understanding.    Access Code: [email or web address]  URL: [email or web address]  Date: 11/08/2023  Prepared by: Cathy Sullivan    Exercises  - Cervical Retraction with Resistance  - 1 x daily - 7 x weekly - 3 sets - 8 reps  - Supine Deep Neck Flexor Training  - 1 x daily - 7 x weekly - 3 sets - 8 reps - 5 sec hold  - Seated Shoulder Row with Resistance Anchored at Feet  - 1 x daily - 7 x weekly - 3 sets - 8 reps  - Supine Lower Trunk Rotation  - 1 x daily - 7 x weekly - 2 sets - 10 reps  - Dead Bug  - 1 x daily - 7 x weekly - 2 sets - 10 reps  - Seated Lumbar Flexion Stretch  - 1 x daily - 7 x weekly - 1 sets - 5 reps - 10 sec hold  Cathy Sullivan, PT

## 2023-11-14 ENCOUNTER — OFFICE VISIT (OUTPATIENT)
Dept: PRIMARY CARE | Facility: CLINIC | Age: 58
End: 2023-11-14
Payer: COMMERCIAL

## 2023-11-14 VITALS
BODY MASS INDEX: 31.31 KG/M2 | HEIGHT: 66 IN | HEART RATE: 60 BPM | WEIGHT: 194.8 LBS | DIASTOLIC BLOOD PRESSURE: 74 MMHG | TEMPERATURE: 97.8 F | SYSTOLIC BLOOD PRESSURE: 122 MMHG | OXYGEN SATURATION: 98 %

## 2023-11-14 DIAGNOSIS — M51.26 HERNIATED LUMBAR INTERVERTEBRAL DISC: ICD-10-CM

## 2023-11-14 DIAGNOSIS — F98.8 ATTENTION DEFICIT DISORDER (ADD) WITHOUT HYPERACTIVITY: ICD-10-CM

## 2023-11-14 DIAGNOSIS — F98.8 ADD (ATTENTION DEFICIT DISORDER) WITHOUT HYPERACTIVITY: Primary | ICD-10-CM

## 2023-11-14 DIAGNOSIS — G54.2 CERVICAL NEUROPATHY: ICD-10-CM

## 2023-11-14 PROCEDURE — 1036F TOBACCO NON-USER: CPT | Performed by: FAMILY MEDICINE

## 2023-11-14 PROCEDURE — 99214 OFFICE O/P EST MOD 30 MIN: CPT | Performed by: FAMILY MEDICINE

## 2023-11-14 RX ORDER — GABAPENTIN 300 MG/1
300 CAPSULE ORAL NIGHTLY
Qty: 90 CAPSULE | Refills: 1 | Status: SHIPPED | OUTPATIENT
Start: 2023-11-14

## 2023-11-14 RX ORDER — DEXTROAMPHETAMINE SACCHARATE, AMPHETAMINE ASPARTATE MONOHYDRATE, DEXTROAMPHETAMINE SULFATE AND AMPHETAMINE SULFATE 5; 5; 5; 5 MG/1; MG/1; MG/1; MG/1
20 CAPSULE, EXTENDED RELEASE ORAL DAILY
Qty: 90 CAPSULE | Refills: 0 | Status: SHIPPED | OUTPATIENT
Start: 2023-11-14 | End: 2024-02-15 | Stop reason: SDUPTHER

## 2023-11-14 ASSESSMENT — ENCOUNTER SYMPTOMS
OCCASIONAL FEELINGS OF UNSTEADINESS: 0
NECK PAIN: 1
BACK PAIN: 1
DEPRESSION: 0
LOSS OF SENSATION IN FEET: 0

## 2023-11-14 ASSESSMENT — PAIN SCALES - GENERAL: PAINLEVEL: 8

## 2023-11-14 ASSESSMENT — SOCIAL DETERMINANTS OF HEALTH (SDOH): IN THE PAST 12 MONTHS, HAS THE ELECTRIC, GAS, OIL, OR WATER COMPANY THREATENED TO SHUT OFF SERVICE IN YOUR HOME?: NO

## 2023-11-14 NOTE — PROGRESS NOTES
"Subjective   Patient ID: Sivakumar Hall \"Lizette" is a 58 y.o. male who presents for Follow-up (Follow up meds refills).   3 month med refills  HPI     Review of Systems   Musculoskeletal:  Positive for back pain and neck pain.   Psychiatric/Behavioral:          ADD       Objective   /74 (BP Location: Left arm)   Pulse 60   Temp 36.6 °C (97.8 °F) (Temporal)   Ht 1.676 m (5' 6\")   Wt 88.4 kg (194 lb 12.8 oz)   SpO2 98%   BMI 31.44 kg/m²     Physical Exam  Vitals and nursing note reviewed.   Musculoskeletal:      Comments: Cervical and lumbar stenosis with radiculopathy         Assessment/Plan patient seen here to follow-up on his attention deficit disorder we refilled his meds which continue to be effective.  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 I have considered the risks of abuse, dependence, addiction and diversion    Is the patient prescribed a combination of a benzodiazepine and opioid?  Yes, I feel it is clincially indicated to continue the medication and have discussed with the patient risks/benefits/alternatives.    Last Urine Drug Screen / ordered today: No  Recent " Results (from the past 8760 hour(s))   Drug Screen, Urine With Reflex to Confirmation    Collection Time: 06/01/23  4:24 PM   Result Value Ref Range    DRUG SCREEN COMMENT URINE SEE BELOW     Amphetamine Screen, Urine PRESUMPTIVE NEGATIVE NEGATIVE    Barbiturate Screen, Urine PRESUMPTIVE NEGATIVE NEGATIVE    BENZODIAZEPINE (PRESENCE) IN URINE BY SCREEN METHOD PRESUMPTIVE NEGATIVE NEGATIVE    Cannabinoid Screen, Urine PRESUMPTIVE NEGATIVE NEGATIVE    Cocaine Screen, Urine PRESUMPTIVE NEGATIVE NEGATIVE    Fentanyl, Ur PRESUMPTIVE NEGATIVE NEGATIVE    Methadone Screen, Urine PRESUMPTIVE NEGATIVE NEGATIVE    Opiate Screen, Urine PRESUMPTIVE NEGATIVE NEGATIVE    Oxycodone Screen, Ur PRESUMPTIVE NEGATIVE NEGATIVE    PCP Screen, Urine PRESUMPTIVE NEGATIVE NEGATIVE     Results are as expected.     Controlled Substance Agreement:  Date of the Last Agreement: 6/1/23  Reviewed Controlled Substance Agreement including but not limited to the benefits, risks, and alternatives to treatment with a Controlled Substance medication(s).    Stimulants:   What is the patient's goal of therapy? ADD  Is this being achieved with current treatment? yes    Activities of Daily Living:   Is your overall impression that this patient is benefiting (symptom reduction outweighs side effects) from stimulant therapy? Yes     1. Physical Functioning: Better  2. Family Relationship: Better  3. Social Relationship: Better  4. Mood: Better  5. Sleep Patterns: Better  6. Overall Function: Better

## 2023-11-16 ENCOUNTER — TREATMENT (OUTPATIENT)
Dept: PHYSICAL THERAPY | Facility: CLINIC | Age: 58
End: 2023-11-16
Payer: COMMERCIAL

## 2023-11-16 DIAGNOSIS — M54.2 CERVICALGIA: ICD-10-CM

## 2023-11-16 DIAGNOSIS — G89.29 CHRONIC MIDLINE LOW BACK PAIN WITHOUT SCIATICA: Chronic | ICD-10-CM

## 2023-11-16 DIAGNOSIS — M54.9 DORSALGIA, UNSPECIFIED: ICD-10-CM

## 2023-11-16 DIAGNOSIS — M54.50 CHRONIC MIDLINE LOW BACK PAIN WITHOUT SCIATICA: Chronic | ICD-10-CM

## 2023-11-16 DIAGNOSIS — M54.2 NECK PAIN: Primary | ICD-10-CM

## 2023-11-16 PROCEDURE — 97110 THERAPEUTIC EXERCISES: CPT | Mod: GP

## 2023-11-16 NOTE — PROGRESS NOTES
Physical Therapy  Physical Therapy Treatment Note    Patient Name: Sivakumar Hall  MRN: [id number]  Today's Date: 11/16/2023  Time Calculation  Start Time: 0918  Stop Time: 0957  Time Calculation (min): 39 min    Insurance:  Payor: MARY / Plan: MARY [facility]   Visit number: 5 (updated 11/16/23)  Initial auth: eval + 4V from 9/20/23-10/19/23 (only used 2V due to illness)  Re-auth: 4V from 10/23/23-11/21/23  20V PCY, 0 used at initial eval          Assessment:   Hip & postural strengthening performed this session without issue. Min verbal + tactile during postural strengthening for appropriate muscle activation. Mild impairments in balance requiring UE support during standing marching + overhead press. Reports feeling compression in neck- no relief with previous trials of manual distraction. Limited progress towards established goals. Pt continues to require skilled physical therapy services to reduce neck and back pain at work.     Plan: Progress with POC, as tolerated. Monitor home program.         Current Problem  1. Neck pain        2. Cervicalgia  Follow Up In Physical Therapy      3. Dorsalgia, unspecified  Follow Up In Physical Therapy      4. Chronic midline low back pain without sciatica            Precautions:  (+) basal cell CA (in remission s/p removal 6 months ago). Patient denies pacemaker, DM, blood thinners, latex allergy, epilepsy/seizures, or other known cardio/neuro/pulmonary problems. Not a fall risk.         Subjective:  Patient reports no change in neck or back pain. Followed up with PCP- ordered MRIs of cervical and lumbar regions. No falls or other changes in status.     Pain: 7.5/10 neck and 6/10 back      Performing HEP?: Yes      Treatment Performed:  Therapeutic Exercise:  39 minutes  3 units  Standing resisted hip flex, 2x10 each leg  Standing resisted hip ABD, 2x10 each leg  Standing resisted hip ext, 2x10 each leg  Standing resisted hip ADD, 2x10 each leg  Standing march with overhead  press 6# DB, 2x10 each side  Standing resisted scapular depression using black cords, 3x10 each side  Scapular wall slides using YTB, 3x8  Prone on swiss ball Y's, 3x6   Prone on swiss ball T's, 3x6   Bent over rows, 4x10      Assigned HEP  Pt instructed to discontinue any exercise that increases pain > 2/10 baseline. Pt verbalized understanding. Hal[employer] access code: [email or web address]     Cathy Sullivan, PT

## 2023-11-21 ENCOUNTER — TREATMENT (OUTPATIENT)
Dept: PHYSICAL THERAPY | Facility: CLINIC | Age: 58
End: 2023-11-21
Payer: COMMERCIAL

## 2023-11-21 DIAGNOSIS — G89.29 CHRONIC MIDLINE LOW BACK PAIN WITHOUT SCIATICA: Chronic | ICD-10-CM

## 2023-11-21 DIAGNOSIS — M54.2 CERVICALGIA: ICD-10-CM

## 2023-11-21 DIAGNOSIS — M54.9 DORSALGIA, UNSPECIFIED: ICD-10-CM

## 2023-11-21 DIAGNOSIS — M54.50 CHRONIC MIDLINE LOW BACK PAIN WITHOUT SCIATICA: Chronic | ICD-10-CM

## 2023-11-21 DIAGNOSIS — M54.2 NECK PAIN: Primary | ICD-10-CM

## 2023-11-21 PROCEDURE — 97110 THERAPEUTIC EXERCISES: CPT | Mod: GP

## 2023-11-21 PROCEDURE — 97140 MANUAL THERAPY 1/> REGIONS: CPT | Mod: GP

## 2023-11-21 NOTE — PROGRESS NOTES
"  Physical Therapy  Physical Therapy Treatment Note    Patient Name: Sivakumar Hall  MRN: [id number]  Today's Date: 11/21/2023  Time Calculation  Start Time: 0832  Stop Time: 0915  Time Calculation (min): 43 min    Insurance:  Payor: MARY / Plan: MARY [facility]   Visit number: 6 (updated 11/16/23)  Initial auth: eval + 4V from 9/20/23-10/19/23 (only used 2V due to illness)  Re-auth: 4V from 10/23/23-11/21/23  20V PCY, 0 used at initial eval       Assessment:   Re-eval performed today after patient has attended  for chronic neck and low back pain. Assessment revealed improvements in global cervical ROM and posture since initial eval. Positively, pt responded well to trial of manual cervical distraction performed this session- voiced reduction in pain. Self-distraction added to HEP for positional relief. Impairments in BUE paresthesias, pain, and decreased standing & gait endurance remain at this time. Skilled physical therapy services continue to be required (1x week for 4 weeks) to safely address previously stated impairments and return patient to PLOF with reduced pain.      Plan: Progress with POC, as tolerated. Monitor home program. Mechanical traction        Current Problem  1. Neck pain        2. Cervicalgia  Follow Up In Physical Therapy      3. Dorsalgia, unspecified  Follow Up In Physical Therapy      4. Chronic midline low back pain without sciatica            Precautions: (+) basal cell CA (in remission s/p removal 6 months ago). Patient denies pacemaker, DM, blood thinners, latex allergy, epilepsy/seizures, or other known cardio/neuro/pulmonary problems. Not a fall risk.       Subjective:   Patient reports \"my head feels heavy on my neck all the time\". Reading, driving, STS transfers, and bed mobility continue to exacerbate neck and/or low back pain. No falls or other changes in status.       Pain: 7/10 neck and 6/10 low back      Performing HEP?: Yes      Objective:   Cervical ROM  Flex:  25 deg  Ext: 27 " deg  L SB:  20 deg  R SB: 24 deg  L rot: 51 deg  R rot: 57 deg    UE Myotomes: L and R  C1/C2 cervical flex and ext: both 5/5 miriam  C3 cervical flex: 5/5 and 5/5  C4 shoulder elevation:   C5 shoulder ABD: 5/5 and 5/5  C6 elbow flex/wrist ext: both 5/5 miriam  C7 elbow ext/wrist flex: both 5/5 miriam  C8: thumb ext: 5/5 and 5/5  T1: finger ABD: 5/5 and 5/5     NDI: 36%    Treatment Performed:  Therapeutic Exercise:  33 minutes, 2 units  Re-eval: see subjective, objective, assessment, and goals  Resisted scap depression using blue cords, 3x10  Shoulder extension to neutral using blue cords, 2x10  Lat pull down using BTB, 3x10    Manual Therapy:  10 minutes, 1 unit  Intermittent cervical distraction, 1x5 min with 1 min rest breaks      Goals  STG (to be achieved in 4 weeks)  1) Pt will independently perform HEP as assigned to promote self-management of symptoms and continue making functional gains outside of physical therapy.  10/19/23: GOAL MET  2) Pt will report BUE numbness and tingling no >2x per day.   10/19/23: NO CHANGE  11/21/23: NO CHANGE- reports numbness and tingling 4x day  3) Pt wll report a reduction low back pain by at least 2/10 daily.    10/19/23: NO CHANGE  11/21/23 NO CHANGE- pain is constantly 6/10    LTG (to be achieved by discharge)  1) Pt will improve miriam cervical SB and rot by at least 5 deg to assist with head turns required for driving.  10/19/23: PROGRESSING- mild improvement in miriam rot, no change in miriam SB  11/21/23: PROGRESSING- improvement in cervical ext, miriam rot, and miriam SB since initial eval  2) Pt will report no numbness or tingling with driving.  10/19/23: NO CHANGE  11/21/23: NO CHANGE  3) Pt will be able to carry 25# box at least 30 feet with no low back pain or BUE radicular sx to assist with carrying cases of alcohol at work.  10/19/23: NOT ASSESSED THIS VISIT  11/21/23: NOT ASSESSED THIS VISIT  4) Pt will improve standing and gait endurance to at least 1 hour with 0/10 low back pain to  assist with working job as a .   10/19/23: PROGRESSING- demonstrated 10 min standing tolerance during session without increase in low back pain  11/21/23: PROGRESSING: reports standing for >15 mins increases pain from baseline  5) Pt will reduce NDI by at least 7.5 points (MCID) to demonstrate reduced neck pain during ADLS/IADLs.   10/19/23: NOT ASSESSED THIS VISIT  11/21/23: Baseline collected this visit: score of 18/50 (36%)      Assigned HEP for Maintenance PRN  Pt instructed to discontinue any exercise that increases pain > 2/10 baseline. Pt verbalized understanding. [employer] access code: [email or web address]      Cathy Sullivan, PT

## 2023-11-28 ENCOUNTER — TELEPHONE (OUTPATIENT)
Dept: PRIMARY CARE | Facility: CLINIC | Age: 58
End: 2023-11-28
Payer: COMMERCIAL

## 2023-11-28 NOTE — TELEPHONE ENCOUNTER
Pt called and states that his insurance company denied his MRI order due to it being at a hospital. Pt states that insurance company told them if they take it somewhere else then they will be able to pay for it. Pt would like to know if you could place another order so that he can get it done.

## 2023-11-30 DIAGNOSIS — M51.26 HERNIATED LUMBAR INTERVERTEBRAL DISC: Primary | ICD-10-CM

## 2023-11-30 DIAGNOSIS — G54.2 CERVICAL NEUROPATHY: Primary | ICD-10-CM

## 2023-11-30 NOTE — TELEPHONE ENCOUNTER
Spoke with pt to let him know about the MRI. Pt states that his MRI for the lumbar and cervical spine was denied due to being in an inpatient setting. Pt would like to know if you could re-order those also due to as long as it is an out patient setting he could get them done

## 2023-12-19 ENCOUNTER — APPOINTMENT (OUTPATIENT)
Dept: RADIOLOGY | Facility: CLINIC | Age: 58
End: 2023-12-19
Payer: COMMERCIAL

## 2023-12-20 ENCOUNTER — APPOINTMENT (OUTPATIENT)
Dept: RADIOLOGY | Facility: CLINIC | Age: 58
End: 2023-12-20
Payer: COMMERCIAL

## 2023-12-28 ENCOUNTER — DOCUMENTATION (OUTPATIENT)
Dept: PHYSICAL THERAPY | Facility: CLINIC | Age: 58
End: 2023-12-28
Payer: COMMERCIAL

## 2023-12-28 NOTE — PROGRESS NOTES
"Physical Therapy    Discharge Summary    Name: Sivakumar Hall \"Deshaun\"  MRN: [id number]  : 1965  Date: 23    Discharge Summary: PT    Discharge Information: Date of discharge 23, Date of last visit 23    Therapy Summary: Date of evaluation 23, Number of attended visits 6, Referred by Solo Andre DO and Referred for chronic neck and low back pain    Discharge Status: Unknown     Rehab Discharge Reason: Failed to schedule and/or keep follow-up appointment(s)  "

## 2024-01-05 DIAGNOSIS — E78.5 HYPERLIPIDEMIA, UNSPECIFIED HYPERLIPIDEMIA TYPE: Primary | ICD-10-CM

## 2024-01-05 RX ORDER — ROSUVASTATIN CALCIUM 10 MG/1
10 TABLET, COATED ORAL DAILY
Qty: 90 TABLET | Refills: 0 | Status: SHIPPED | OUTPATIENT
Start: 2024-01-05 | End: 2024-04-09 | Stop reason: SDUPTHER

## 2024-01-08 ENCOUNTER — OFFICE VISIT (OUTPATIENT)
Dept: ORTHOPEDIC SURGERY | Facility: CLINIC | Age: 59
End: 2024-01-08
Payer: COMMERCIAL

## 2024-01-08 DIAGNOSIS — M54.2 NECK PAIN: Primary | ICD-10-CM

## 2024-01-08 PROCEDURE — 1036F TOBACCO NON-USER: CPT | Performed by: ORTHOPAEDIC SURGERY

## 2024-01-08 PROCEDURE — 99204 OFFICE O/P NEW MOD 45 MIN: CPT | Performed by: ORTHOPAEDIC SURGERY

## 2024-01-08 NOTE — PROGRESS NOTES
Deshaun is a very pleasant 58-year-old man who is a  at the [employer] who is referred by Dr. Agustin Quiroz.    He had a very remote anterior cervical discectomy and fusion at C6-7 by Dr. Gentile that he did quite well with.    For several years, and in particular over the last 6 months, he has had significant axial neck pain and headaches with diminished motion.    He has chronic numbness in both hands.  No severe radicular pain.    He is having a lot of difficulty sleeping and with overhead activities.    He went through several months of physical therapy, ending in November 2023.  It provided some transient relief.    He has had prior lumbar surgery.    Family, social, and medical histories are obtained and reviewed.    30-point, patient-recorded Review of Systems is personally obtained and reviewed. Inclusive is no history of weight loss, change in appetite, recent change in activity level, change in bowel or bladder habits, fevers, chills, malaise, or night pain.    He does not smoke. He is here today with his wife.    On exam healthy-appearing man no acute distress.  He has a healed right anterior cervical scar just above his clavicle.  Some limited motion cervical spine.  Negative Lhermitte's.    Strength is intact in both upper extremities without pathologic reflexes.    Plain films show a healed anterior fusion at C6-7 with a plate that is relatively close to the C5-6 disc space.  There are severe degenerative changes at the C5-6 disc space.    His MRI confirms significant degenerative change at C5-6 with mild canal stenosis and bilateral foraminal stenosis.    Impression: He has significant axial neck pain with limited motion and some degree of cervical radiculopathy.    He is not myelopathic.    He has had appropriate treatment with extended physical therapy.    He is a candidate for surgery in the form of an anterior cervical discectomy and fusion at C5-6.  This would be elective.   Preoperatively, he would need a ENT evaluation of his vocal cord function, as it would be preferable to approach this from the left side.    He is going to think about things.  Other options would be to continue with an exercise program and consider the possibility of cervical spine injections.    ** Dictated with voice recognition software and not immediately reviewed for errors in grammar and/or spelling **

## 2024-01-08 NOTE — LETTER
January 8, 2024     Solo Andre DO  [address]  [address]  [facility] [address]    Patient: Deshaun Hall   YOB: 1965   Date of Visit: 1/8/2024       Dear Dr. Solo Andre DO:    Thank you for referring Deshaun Hall to me for evaluation. Below are my notes for this consultation.  If you have questions, please do not hesitate to call me. I look forward to following your patient along with you.       Sincerely,     Nicolas Thomson MD      CC: No Recipients  ______________________________________________________________________________________    Deshaun is a very pleasant 58-year-old man who is a  at the [employer] who is referred by Dr. Agustin Quiroz.    He had a very remote anterior cervical discectomy and fusion at C6-7 by Dr. Gentile that he did quite well with.    For several years, and in particular over the last 6 months, he has had significant axial neck pain and headaches with diminished motion.    He has chronic numbness in both hands.  No severe radicular pain.    He is having a lot of difficulty sleeping and with overhead activities.    He went through several months of physical therapy, ending in November 2023.  It provided some transient relief.    He has had prior lumbar surgery.    Family, social, and medical histories are obtained and reviewed.    30-point, patient-recorded Review of Systems is personally obtained and reviewed. Inclusive is no history of weight loss, change in appetite, recent change in activity level, change in bowel or bladder habits, fevers, chills, malaise, or night pain.    He does not smoke. He is here today with his wife.    On exam healthy-appearing man no acute distress.  He has a healed right anterior cervical scar just above his clavicle.  Some limited motion cervical spine.  Negative Lhermitte's.    Strength is intact in both upper extremities without pathologic reflexes.    Plain films show a healed anterior fusion  at C6-7 with a plate that is relatively close to the C5-6 disc space.  There are severe degenerative changes at the C5-6 disc space.    His MRI confirms significant degenerative change at C5-6 with mild canal stenosis and bilateral foraminal stenosis.    Impression: He has significant axial neck pain with limited motion and some degree of cervical radiculopathy.    He is not myelopathic.    He has had appropriate treatment with extended physical therapy.    He is a candidate for surgery in the form of an anterior cervical discectomy and fusion at C5-6.  This would be elective.  Preoperatively, he would need a ENT evaluation of his vocal cord function, as it would be preferable to approach this from the left side.    He is going to think about things.  Other options would be to continue with an exercise program and consider the possibility of cervical spine injections.    ** Dictated with voice recognition software and not immediately reviewed for errors in grammar and/or spelling **

## 2024-01-16 ENCOUNTER — OFFICE VISIT (OUTPATIENT)
Dept: PAIN MEDICINE | Facility: CLINIC | Age: 59
End: 2024-01-16
Payer: COMMERCIAL

## 2024-01-16 DIAGNOSIS — M47.812 CERVICAL SPONDYLOSIS: Primary | ICD-10-CM

## 2024-01-16 DIAGNOSIS — M54.2 NECK PAIN: ICD-10-CM

## 2024-01-16 PROCEDURE — 1036F TOBACCO NON-USER: CPT | Performed by: ANESTHESIOLOGY

## 2024-01-16 PROCEDURE — 99203 OFFICE O/P NEW LOW 30 MIN: CPT | Performed by: ANESTHESIOLOGY

## 2024-01-16 PROCEDURE — 99213 OFFICE O/P EST LOW 20 MIN: CPT | Performed by: ANESTHESIOLOGY

## 2024-01-16 ASSESSMENT — ENCOUNTER SYMPTOMS
ABDOMINAL PAIN: 0
NECK PAIN: 1
FEVER: 0
ADENOPATHY: 0
SHORTNESS OF BREATH: 0
WEAKNESS: 0
EYE PAIN: 0
CHEST TIGHTNESS: 0
OCCASIONAL FEELINGS OF UNSTEADINESS: 0
DEPRESSION: 0
LOSS OF SENSATION IN FEET: 0
DIFFICULTY URINATING: 0

## 2024-01-16 ASSESSMENT — COLUMBIA-SUICIDE SEVERITY RATING SCALE - C-SSRS
2. HAVE YOU ACTUALLY HAD ANY THOUGHTS OF KILLING YOURSELF?: NO
6. HAVE YOU EVER DONE ANYTHING, STARTED TO DO ANYTHING, OR PREPARED TO DO ANYTHING TO END YOUR LIFE?: NO
1. IN THE PAST MONTH, HAVE YOU WISHED YOU WERE DEAD OR WISHED YOU COULD GO TO SLEEP AND NOT WAKE UP?: NO

## 2024-01-16 ASSESSMENT — PATIENT HEALTH QUESTIONNAIRE - PHQ9
1. LITTLE INTEREST OR PLEASURE IN DOING THINGS: NOT AT ALL
SUM OF ALL RESPONSES TO PHQ9 QUESTIONS 1 AND 2: 0
2. FEELING DOWN, DEPRESSED OR HOPELESS: NOT AT ALL

## 2024-01-16 NOTE — H&P (VIEW-ONLY)
"Chief Complain   Neck pain    History Of Present Illness  Sivakumar Hall \"Lizette" is a 58 y.o. male here for evaluation of neck pain and numbness both hands. The patient has been experiencing these symptoms for last 6 month(s). The patient describes the pain as sharp, dull. The patient's current pain score is 8 on a scale from 0-10. The pain is worsened by  movement of neck  and is alleviated by nothing relieves the pain. Since the start of the symptoms the pain has been unchanged.    The patient denies any fever, chills, weight loss, weakness, numbness, bladder/ bowel incontinence, history of cancer, history of IV drug abuse, recent trauma.      Past Medical History  He has no past medical history on file.    Surgical History  He has no past surgical history on file.    Social History  He reports that he has never smoked. He has never used smokeless tobacco. He reports that he does not drink alcohol and does not use drugs.    Family History  Family History   Problem Relation Name Age of Onset    Prostate cancer Father          Allergies  Other    Review of Systems  Review of Systems   Constitutional:  Negative for fever.   HENT:  Negative for ear pain.    Eyes:  Negative for pain.   Respiratory:  Negative for chest tightness and shortness of breath.    Cardiovascular:  Negative for chest pain.   Gastrointestinal:  Negative for abdominal pain.   Endocrine: Negative for cold intolerance and heat intolerance.   Genitourinary:  Negative for difficulty urinating.   Musculoskeletal:  Positive for neck pain.   Skin:  Negative for rash.   Allergic/Immunologic: Negative for food allergies.   Neurological:  Negative for weakness.   Hematological:  Negative for adenopathy.   Psychiatric/Behavioral:  Negative for suicidal ideas.         Physical Exam  Physical Exam  HENT:      Head: Normocephalic and atraumatic.      Right Ear: External ear normal.      Left Ear: External ear normal.      Nose: Nose normal.      Mouth/Throat:    " "  Mouth: Mucous membranes are moist.   Eyes:      Extraocular Movements: Extraocular movements intact.      Pupils: Pupils are equal, round, and reactive to light.   Cardiovascular:      Rate and Rhythm: Normal rate.   Pulmonary:      Effort: Pulmonary effort is normal.   Abdominal:      Palpations: Abdomen is soft.   Musculoskeletal:      Cervical back: Neck supple.   Skin:     General: Skin is warm.   Neurological:      General: No focal deficit present.      Mental Status: He is alert and oriented to person, place, and time.   Psychiatric:         Mood and Affect: Mood normal.         Behavior: Behavior normal.           Last Recorded Vitals  There were no vitals taken for this visit.    Reviewed Images  Reviewed and independently interpreted MRI Cervical spine ACDF at C6-7 degenerative changes at C5-6 with type I Modic change    Reviewed Labs   Latest Reference Range & Units 02/09/23 12:27   WBC 4.4 - 11.3 x10E9/L 5.9   nRBC 0.0 - 0.0 /100 WBC 0.0   RBC 4.50 - 5.90 x10E12/L 5.04   HEMOGLOBIN 13.5 - 17.5 g/dL 15.5   HEMATOCRIT 41.0 - 52.0 % 49.0   MCV 80 - 100 fL 97   MCHC 32.0 - 36.0 g/dL 31.6 (L)   RED CELL DISTRIBUTION WIDTH 11.5 - 14.5 % 12.4   Platelets 150 - 450 x10E9/L 202   Neutrophils % 40.0 - 80.0 % 58.5   Immature Granulocytes %, Automated 0.0 - 0.9 % 0.3   Lymphocytes % 13.0 - 44.0 % 30.4   Monocytes % 2.0 - 10.0 % 6.5   Eosinophils % 0.0 - 6.0 % 3.4   Basophils % 0.0 - 2.0 % 0.9   Neutrophils Absolute 1.20 - 7.70 x10E9/L 3.44   Lymphocytes Absolute 1.20 - 4.80 x10E9/L 1.79   Monocytes Absolute 0.10 - 1.00 x10E9/L 0.38   Eosinophils Absolute 0.00 - 0.70 x10E9/L 0.20   Basophils Absolute 0.00 - 0.10 x10E9/L 0.05   (L): Data is abnormally low     Assessment/Plan   Encounter Diagnoses   Name Primary?    Neck pain     Cervical spondylosis Yes        Sivakumar Vaughnmeñodahiana \"Deshaun\" is a 58 y.o. male here for evaluation of chronic neck pain.  He has been experiencing the symptoms for last 6 months or so.  He has " history of C6-7 fusion did fine after the surgery for years.  Denies any trauma or inciting event.  He has done physical therapy and home traction without any significant benefit.  Also tried nonsteroidal anti-inflammatory medication.  Reviewed the x-ray and MRI of his cervical spine revealing degenerative changes above his fusion.  On physical examination does have tenderness over lower cervical facets.  He has been evaluated by spine surgery who have recommended maximizing conservative treatment before consideration of surgery.  I would recommend trial of diagnostic C5-6 medial branch block to rule out facetogenic component and consideration of radiofrequency ablation.     Bay Tripp MD

## 2024-01-31 ENCOUNTER — APPOINTMENT (OUTPATIENT)
Dept: PAIN MEDICINE | Facility: CLINIC | Age: 59
End: 2024-01-31
Payer: COMMERCIAL

## 2024-01-31 ENCOUNTER — APPOINTMENT (OUTPATIENT)
Dept: RADIOLOGY | Facility: CLINIC | Age: 59
End: 2024-01-31
Payer: COMMERCIAL

## 2024-02-09 ENCOUNTER — HOSPITAL ENCOUNTER (OUTPATIENT)
Dept: RADIOLOGY | Facility: CLINIC | Age: 59
Discharge: HOME | End: 2024-02-09
Payer: COMMERCIAL

## 2024-02-09 ENCOUNTER — TELEPHONE (OUTPATIENT)
Dept: PAIN MEDICINE | Facility: CLINIC | Age: 59
End: 2024-02-09

## 2024-02-09 ENCOUNTER — HOSPITAL ENCOUNTER (OUTPATIENT)
Dept: PAIN MEDICINE | Facility: CLINIC | Age: 59
Discharge: HOME | End: 2024-02-09
Payer: COMMERCIAL

## 2024-02-09 VITALS
HEART RATE: 60 BPM | DIASTOLIC BLOOD PRESSURE: 79 MMHG | OXYGEN SATURATION: 95 % | RESPIRATION RATE: 148 BRPM | TEMPERATURE: 97 F | SYSTOLIC BLOOD PRESSURE: 135 MMHG

## 2024-02-09 DIAGNOSIS — M54.2 NECK PAIN: ICD-10-CM

## 2024-02-09 PROBLEM — M47.812 SPONDYLOSIS OF CERVICAL REGION WITHOUT MYELOPATHY OR RADICULOPATHY: Status: ACTIVE | Noted: 2024-02-09

## 2024-02-09 PROCEDURE — 64490 INJ PARAVERT F JNT C/T 1 LEV: CPT | Performed by: ANESTHESIOLOGY

## 2024-02-09 PROCEDURE — 2500000005 HC RX 250 GENERAL PHARMACY W/O HCPCS

## 2024-02-09 PROCEDURE — 64491 INJ PARAVERT F JNT C/T 2 LEV: CPT | Performed by: ANESTHESIOLOGY

## 2024-02-09 PROCEDURE — 2500000004 HC RX 250 GENERAL PHARMACY W/ HCPCS (ALT 636 FOR OP/ED)

## 2024-02-09 PROCEDURE — 77003 FLUOROGUIDE FOR SPINE INJECT: CPT

## 2024-02-09 RX ORDER — LIDOCAINE HYDROCHLORIDE 10 MG/ML
INJECTION, SOLUTION EPIDURAL; INFILTRATION; INTRACAUDAL; PERINEURAL
Status: COMPLETED
Start: 2024-02-09 | End: 2024-02-09

## 2024-02-09 RX ORDER — ROPIVACAINE HYDROCHLORIDE 5 MG/ML
INJECTION, SOLUTION EPIDURAL; INFILTRATION; PERINEURAL
Status: COMPLETED
Start: 2024-02-09 | End: 2024-02-09

## 2024-02-09 RX ADMIN — ROPIVACAINE HYDROCHLORIDE 100 MG: 5 INJECTION, SOLUTION EPIDURAL; INFILTRATION; PERINEURAL at 09:15

## 2024-02-09 RX ADMIN — LIDOCAINE HYDROCHLORIDE 300 MG: 10 INJECTION, SOLUTION EPIDURAL; INFILTRATION; INTRACAUDAL; PERINEURAL at 09:15

## 2024-02-09 ASSESSMENT — ENCOUNTER SYMPTOMS
LOSS OF SENSATION IN FEET: 0
OCCASIONAL FEELINGS OF UNSTEADINESS: 0
DEPRESSION: 0

## 2024-02-09 ASSESSMENT — PATIENT HEALTH QUESTIONNAIRE - PHQ9
2. FEELING DOWN, DEPRESSED OR HOPELESS: NOT AT ALL
1. LITTLE INTEREST OR PLEASURE IN DOING THINGS: NOT AT ALL
SUM OF ALL RESPONSES TO PHQ9 QUESTIONS 1 AND 2: 0

## 2024-02-09 ASSESSMENT — PAIN SCALES - GENERAL
PAINLEVEL_OUTOF10: 0 - NO PAIN
PAINLEVEL_OUTOF10: 7

## 2024-02-09 ASSESSMENT — COLUMBIA-SUICIDE SEVERITY RATING SCALE - C-SSRS
6. HAVE YOU EVER DONE ANYTHING, STARTED TO DO ANYTHING, OR PREPARED TO DO ANYTHING TO END YOUR LIFE?: NO
1. IN THE PAST MONTH, HAVE YOU WISHED YOU WERE DEAD OR WISHED YOU COULD GO TO SLEEP AND NOT WAKE UP?: NO
2. HAVE YOU ACTUALLY HAD ANY THOUGHTS OF KILLING YOURSELF?: NO

## 2024-02-09 ASSESSMENT — PAIN - FUNCTIONAL ASSESSMENT: PAIN_FUNCTIONAL_ASSESSMENT: 0-10

## 2024-02-09 NOTE — LETTER
February 12, 2024     Patient: Sivakumar Hall   YOB: 1965   Date of Visit: 2/9/2024       To Whom It May Concern:    Sivakumar Hall was seen in my clinic on 2/9/2024 at ?. Please excuse Sivakumar for his absence from work on this day to make the appointment.    If you have any questions or concerns, please don't hesitate to call.         Sincerely,         Bay Tripp MD        CC: No Recipients

## 2024-02-09 NOTE — TELEPHONE ENCOUNTER
Would like a return to work letter for today's visit.  Could this be put into [employer]?

## 2024-02-09 NOTE — OP NOTE
"Procedure Note     Date: 2024  OR Location: PAR NON-OR PROCEDURES    Name: Sivaukmar Hall \"Lizette", : 1965, Age: 58 y.o., MRN: [id number], Sex: male    Diagnosis  Preprocedure diagnosis: Cervical spondylosis  Postprocedure diagnosis: Same    Procedures    Right cervical medial branch radiofrequency ablation    The patient was seen in the preoperative area. The risks, benefits, complications, treatment options, non-operative alternatives, expected recovery and outcomes were discussed with the patient. The patient concurred with the proposed plan, giving informed consent.        The patient was taken to the procedure room placed in prone position. Patient was connected to the monitors including EKG, blood pressure, pulse oximetry. Timeout was completed. Area on the  lower cervical spine was prepped and draped using standard sterile precautions. Skin was anesthetized using 1% lidocaine with 25-gauge needle, total 5 mL of 1% lidocaine solution was used. Under fluoroscopy an 25-gauge 3.5 inch spinal needle was advanced respective pillars of right C 5, 6 levels and on  lateral views the needle was positioned in the middle of trapezoidal body of respective levels.  The positioning of the needles were confirmed with AP, lateral and oblique views under fluoroscopy.    After negative aspiration 0.5 mL of Omnipaque was injected at each level was negative for any intravascular spread.  Then after negative aspiration 0.5 mL of 0.5% ropivacaine was injected at each level.  It was then withdrawn from the right side.  Then we commenced with the process on the left side however the patient started noticing sweating, nausea and his heart rate dropped.  At that point he was diagnosed of having vasovagal attack.  As a result we did not finish the procedure on the left side.    Complications:  None; patient tolerated the procedure well.    Disposition: Home  Condition: stable         Additional Details: NA    Attending " Attestation: I performed the procedure.    Bay Tripp MD

## 2024-02-15 ENCOUNTER — OFFICE VISIT (OUTPATIENT)
Dept: PRIMARY CARE | Facility: CLINIC | Age: 59
End: 2024-02-15
Payer: COMMERCIAL

## 2024-02-15 VITALS
TEMPERATURE: 98 F | HEART RATE: 71 BPM | DIASTOLIC BLOOD PRESSURE: 80 MMHG | HEIGHT: 66 IN | WEIGHT: 198.2 LBS | SYSTOLIC BLOOD PRESSURE: 120 MMHG | BODY MASS INDEX: 31.85 KG/M2 | OXYGEN SATURATION: 100 %

## 2024-02-15 DIAGNOSIS — F98.8 ATTENTION DEFICIT DISORDER (ADD) WITHOUT HYPERACTIVITY: ICD-10-CM

## 2024-02-15 DIAGNOSIS — M51.26 HERNIATED LUMBAR INTERVERTEBRAL DISC: ICD-10-CM

## 2024-02-15 DIAGNOSIS — F98.8 ADD (ATTENTION DEFICIT DISORDER) WITHOUT HYPERACTIVITY: Primary | ICD-10-CM

## 2024-02-15 DIAGNOSIS — M48.02 SPINAL STENOSIS OF CERVICAL REGION: ICD-10-CM

## 2024-02-15 PROBLEM — J01.90 ACUTE SINUSITIS: Status: ACTIVE | Noted: 2023-05-24

## 2024-02-15 PROCEDURE — 1036F TOBACCO NON-USER: CPT | Performed by: FAMILY MEDICINE

## 2024-02-15 PROCEDURE — 99213 OFFICE O/P EST LOW 20 MIN: CPT | Performed by: FAMILY MEDICINE

## 2024-02-15 RX ORDER — DEXTROAMPHETAMINE SACCHARATE, AMPHETAMINE ASPARTATE MONOHYDRATE, DEXTROAMPHETAMINE SULFATE AND AMPHETAMINE SULFATE 5; 5; 5; 5 MG/1; MG/1; MG/1; MG/1
20 CAPSULE, EXTENDED RELEASE ORAL DAILY
Qty: 90 CAPSULE | Refills: 0 | Status: SHIPPED | OUTPATIENT
Start: 2024-02-15 | End: 2024-05-17 | Stop reason: SDUPTHER

## 2024-02-15 ASSESSMENT — ENCOUNTER SYMPTOMS
DEPRESSION: 0
OCCASIONAL FEELINGS OF UNSTEADINESS: 0
CONSTITUTIONAL NEGATIVE: 1
LOSS OF SENSATION IN FEET: 0

## 2024-02-15 ASSESSMENT — PAIN SCALES - GENERAL: PAINLEVEL: 6

## 2024-02-15 NOTE — PROGRESS NOTES
"Subjective   Patient ID: Sivakumar Hall \"Lizette" is a 58 y.o. male who presents for Follow-up (Adderall med refills).   3 month med refills  HPI     Review of Systems   Constitutional: Negative.    Psychiatric/Behavioral:          ADD       Objective   /80 (BP Location: Left arm)   Pulse 71   Temp 36.7 °C (98 °F) (Temporal)   Ht 1.676 m (5' 6\")   Wt 89.9 kg (198 lb 3.2 oz)   SpO2 100%   BMI 31.99 kg/m²     Physical Exam  Vitals and nursing note reviewed.   Constitutional:       Appearance: Normal appearance.   Neurological:      General: No focal deficit present.      Mental Status: He is alert and oriented to person, place, and time.   Psychiatric:         Mood and Affect: Mood normal.         Behavior: Behavior normal.         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 I have considered the risks of abuse, dependence, addiction and diversion    Is the patient prescribed a combination of a benzodiazepine and opioid?  No    Last Urine Drug Screen / ordered today: No  Recent Results (from the past 8760 hour(s))   Drug Screen, Urine With Reflex to Confirmation    Collection Time: 06/01/23  4:24 PM   Result Value Ref Range    DRUG SCREEN COMMENT URINE SEE BELOW     Amphetamine Screen, Urine PRESUMPTIVE NEGATIVE NEGATIVE    " Barbiturate Screen, Urine PRESUMPTIVE NEGATIVE NEGATIVE    BENZODIAZEPINE (PRESENCE) IN URINE BY SCREEN METHOD PRESUMPTIVE NEGATIVE NEGATIVE    Cannabinoid Screen, Urine PRESUMPTIVE NEGATIVE NEGATIVE    Cocaine Screen, Urine PRESUMPTIVE NEGATIVE NEGATIVE    Fentanyl, Ur PRESUMPTIVE NEGATIVE NEGATIVE    Methadone Screen, Urine PRESUMPTIVE NEGATIVE NEGATIVE    Opiate Screen, Urine PRESUMPTIVE NEGATIVE NEGATIVE    Oxycodone Screen, Ur PRESUMPTIVE NEGATIVE NEGATIVE    PCP Screen, Urine PRESUMPTIVE NEGATIVE NEGATIVE     Results are as expected.     Controlled Substance Agreement:  Date of the Last Agreement: 6/1/23  Reviewed Controlled Substance Agreement including but not limited to the benefits, risks, and alternatives to treatment with a Controlled Substance medication(s).    Stimulants:   What is the patient's goal of therapy? ADD  Is this being achieved with current treatment? yes    Activities of Daily Living:   Is your overall impression that this patient is benefiting (symptom reduction outweighs side effects) from stimulant therapy? Yes     1. Physical Functioning: Better  2. Family Relationship: Better  3. Social Relationship: Better  4. Mood: Better  5. Sleep Patterns: Better  6. Overall Function: Better

## 2024-02-16 ENCOUNTER — TELEPHONE (OUTPATIENT)
Dept: PAIN MEDICINE | Facility: CLINIC | Age: 59
End: 2024-02-16

## 2024-02-16 ENCOUNTER — APPOINTMENT (OUTPATIENT)
Dept: PRIMARY CARE | Facility: CLINIC | Age: 59
End: 2024-02-16
Payer: COMMERCIAL

## 2024-03-22 ENCOUNTER — OFFICE VISIT (OUTPATIENT)
Dept: PAIN MEDICINE | Facility: CLINIC | Age: 59
End: 2024-03-22
Payer: COMMERCIAL

## 2024-03-22 VITALS
TEMPERATURE: 97.2 F | HEIGHT: 66 IN | SYSTOLIC BLOOD PRESSURE: 126 MMHG | BODY MASS INDEX: 31.02 KG/M2 | RESPIRATION RATE: 18 BRPM | OXYGEN SATURATION: 97 % | HEART RATE: 67 BPM | DIASTOLIC BLOOD PRESSURE: 75 MMHG | WEIGHT: 193 LBS

## 2024-03-22 DIAGNOSIS — M47.812 SPONDYLOSIS OF CERVICAL REGION WITHOUT MYELOPATHY OR RADICULOPATHY: Primary | ICD-10-CM

## 2024-03-22 PROCEDURE — 1036F TOBACCO NON-USER: CPT | Performed by: ANESTHESIOLOGY

## 2024-03-22 PROCEDURE — 99213 OFFICE O/P EST LOW 20 MIN: CPT | Performed by: ANESTHESIOLOGY

## 2024-03-22 RX ORDER — DIAZEPAM 5 MG/1
5-10 TABLET ORAL ONCE
Qty: 2 TABLET | Refills: 0 | Status: SHIPPED | OUTPATIENT
Start: 2024-03-22 | End: 2024-05-17 | Stop reason: WASHOUT

## 2024-03-22 SDOH — ECONOMIC STABILITY: FOOD INSECURITY: WITHIN THE PAST 12 MONTHS, YOU WORRIED THAT YOUR FOOD WOULD RUN OUT BEFORE YOU GOT MONEY TO BUY MORE.: NEVER TRUE

## 2024-03-22 SDOH — ECONOMIC STABILITY: FOOD INSECURITY: WITHIN THE PAST 12 MONTHS, THE FOOD YOU BOUGHT JUST DIDN'T LAST AND YOU DIDN'T HAVE MONEY TO GET MORE.: NEVER TRUE

## 2024-03-22 ASSESSMENT — ENCOUNTER SYMPTOMS
FEVER: 0
OCCASIONAL FEELINGS OF UNSTEADINESS: 0
LOSS OF SENSATION IN FEET: 0
DEPRESSION: 0
BLOOD IN STOOL: 0
SHORTNESS OF BREATH: 0
UNEXPECTED WEIGHT CHANGE: 0
NECK PAIN: 1

## 2024-03-22 ASSESSMENT — PAIN SCALES - GENERAL
PAINLEVEL: 8
PAINLEVEL_OUTOF10: 8

## 2024-03-22 ASSESSMENT — PAIN - FUNCTIONAL ASSESSMENT: PAIN_FUNCTIONAL_ASSESSMENT: 0-10

## 2024-03-22 ASSESSMENT — LIFESTYLE VARIABLES
AUDIT-C TOTAL SCORE: 2
SKIP TO QUESTIONS 9-10: 1
HOW OFTEN DO YOU HAVE A DRINK CONTAINING ALCOHOL: 2-4 TIMES A MONTH
HOW OFTEN DO YOU HAVE SIX OR MORE DRINKS ON ONE OCCASION: NEVER
HOW MANY STANDARD DRINKS CONTAINING ALCOHOL DO YOU HAVE ON A TYPICAL DAY: 1 OR 2

## 2024-03-22 ASSESSMENT — COLUMBIA-SUICIDE SEVERITY RATING SCALE - C-SSRS
1. IN THE PAST MONTH, HAVE YOU WISHED YOU WERE DEAD OR WISHED YOU COULD GO TO SLEEP AND NOT WAKE UP?: NO
6. HAVE YOU EVER DONE ANYTHING, STARTED TO DO ANYTHING, OR PREPARED TO DO ANYTHING TO END YOUR LIFE?: NO
2. HAVE YOU ACTUALLY HAD ANY THOUGHTS OF KILLING YOURSELF?: NO

## 2024-03-22 ASSESSMENT — PAIN DESCRIPTION - DESCRIPTORS: DESCRIPTORS: TENDER

## 2024-03-22 NOTE — PROGRESS NOTES
"  Chief Complain  Follow-up (Had Cervical MBB on 2/9 with 80-90% relief the first day and it started to wear off afterwards/Pain is back in  neck)       History Of Present Illness  Sivakumar Hall \"Lizette" is a 58 y.o. male here for neck pain. The patient rates the pain at 7  on a scale from 0-10.  The patient describes pain as pinching.  The pain is worsened by  tilting the head  and is alleviated by  traction .  Since the last visit the pain has stayed the same.  The patient denies any fever, chills, weight loss, bladder/bowel incontinence.     Previous Procedures:  Cervical medial branch block on 2/9 provided him with 80 to 90% relief for a day    Past Medical History  He has no past medical history on file.    Surgical History  He has no past surgical history on file.     Social History  He reports that he has never smoked. He has never used smokeless tobacco. He reports that he does not drink alcohol and does not use drugs.    Family History  Family History   Problem Relation Name Age of Onset    Prostate cancer Father          Allergies  Patient has no known allergies.    Review of Systems  Review of Systems   Constitutional:  Negative for fever and unexpected weight change.   Respiratory:  Negative for shortness of breath.    Cardiovascular:  Negative for chest pain.   Gastrointestinal:  Negative for blood in stool.   Genitourinary:         -ve for bladder or bowel incontinence    Musculoskeletal:  Positive for neck pain.   Neurological:         -ve Balance issues/fine motor skill problems   Psychiatric/Behavioral:  Negative for suicidal ideas.         Physical Exam  Physical Exam  HENT:      Head: Normocephalic and atraumatic.   Eyes:      Extraocular Movements: Extraocular movements intact.      Pupils: Pupils are equal, round, and reactive to light.   Pulmonary:      Effort: Pulmonary effort is normal.   Musculoskeletal:      Cervical back: Neck supple.   Neurological:      Mental Status: He is alert. " "  Psychiatric:         Mood and Affect: Mood normal.         Last Recorded Vitals  Blood pressure 126/75, pulse 67, temperature 36.2 °C (97.2 °F), resp. rate 18, height 1.676 m (5' 6\"), weight 87.5 kg (193 lb), SpO2 97 %.       Assessment/Plan     Sivakumar Hall \"Lizette" is a 58 y.o. male here for follow-up of chronic neck pain.  Last visit he had right C5-6 medial branch block, he was unable to tolerate the left-sided due to vasovagal attack.  Did provide with 90% relief as far as neck pain is concerned.  Lasted for few hours.  Given the results I would recommend repeating bilateral C5-6 medial branch block if he gets similar results would consider radiofrequency ablation.  Short prescription for Valium was provided for procedure anxiety it may help reduce the incidence of vasovagal during the procedure.      Bay Tripp MD  "

## 2024-04-03 ENCOUNTER — HOSPITAL ENCOUNTER (OUTPATIENT)
Dept: RADIOLOGY | Facility: CLINIC | Age: 59
Discharge: HOME | End: 2024-04-03
Payer: COMMERCIAL

## 2024-04-03 ENCOUNTER — HOSPITAL ENCOUNTER (OUTPATIENT)
Dept: PAIN MEDICINE | Facility: CLINIC | Age: 59
Discharge: HOME | End: 2024-04-03
Payer: COMMERCIAL

## 2024-04-03 VITALS
TEMPERATURE: 96.6 F | OXYGEN SATURATION: 95 % | SYSTOLIC BLOOD PRESSURE: 175 MMHG | DIASTOLIC BLOOD PRESSURE: 104 MMHG | RESPIRATION RATE: 18 BRPM | HEART RATE: 85 BPM

## 2024-04-03 DIAGNOSIS — M47.812 SPONDYLOSIS OF CERVICAL REGION WITHOUT MYELOPATHY OR RADICULOPATHY: ICD-10-CM

## 2024-04-03 PROCEDURE — 2500000004 HC RX 250 GENERAL PHARMACY W/ HCPCS (ALT 636 FOR OP/ED)

## 2024-04-03 PROCEDURE — 64490 INJ PARAVERT F JNT C/T 1 LEV: CPT | Performed by: ANESTHESIOLOGY

## 2024-04-03 PROCEDURE — 2500000005 HC RX 250 GENERAL PHARMACY W/O HCPCS

## 2024-04-03 PROCEDURE — 64490 INJ PARAVERT F JNT C/T 1 LEV: CPT | Mod: 50 | Performed by: ANESTHESIOLOGY

## 2024-04-03 RX ORDER — ROPIVACAINE HYDROCHLORIDE 5 MG/ML
INJECTION, SOLUTION EPIDURAL; INFILTRATION; PERINEURAL
Status: COMPLETED
Start: 2024-04-03 | End: 2024-04-03

## 2024-04-03 RX ORDER — LIDOCAINE HYDROCHLORIDE 10 MG/ML
INJECTION, SOLUTION EPIDURAL; INFILTRATION; INTRACAUDAL; PERINEURAL
Status: COMPLETED
Start: 2024-04-03 | End: 2024-04-03

## 2024-04-03 RX ADMIN — ROPIVACAINE HYDROCHLORIDE 100 MG: 5 INJECTION, SOLUTION EPIDURAL; INFILTRATION; PERINEURAL at 13:26

## 2024-04-03 RX ADMIN — LIDOCAINE HYDROCHLORIDE 50 MG: 10 INJECTION, SOLUTION EPIDURAL; INFILTRATION; INTRACAUDAL; PERINEURAL at 13:27

## 2024-04-03 ASSESSMENT — PAIN SCALES - GENERAL
PAINLEVEL_OUTOF10: 0 - NO PAIN
PAINLEVEL_OUTOF10: 7

## 2024-04-03 ASSESSMENT — PAIN - FUNCTIONAL ASSESSMENT: PAIN_FUNCTIONAL_ASSESSMENT: 0-10

## 2024-04-03 ASSESSMENT — ENCOUNTER SYMPTOMS
OCCASIONAL FEELINGS OF UNSTEADINESS: 0
DEPRESSION: 0
LOSS OF SENSATION IN FEET: 0

## 2024-04-03 ASSESSMENT — PAIN DESCRIPTION - DESCRIPTORS: DESCRIPTORS: ACHING;SHARP

## 2024-04-03 NOTE — Clinical Note
Prepped with ChloraPrep, a minimum of 3 minute dry time, longer if needed, no pooling noted, patient draped in sterile fashion. Medial branch block cervical

## 2024-04-03 NOTE — OP NOTE
"*Procedure Note     Date: 4/3/2024  OR Location: PAR NON-OR PROCEDURES    Name: Sivakumar Hall \"Lizette", : 1965, Age: 58 y.o., MRN: [id number], Sex: male    Diagnosis  Preprocedure diagnosis: Cervical spondylosis  Postprocedure diagnosis: Same    Procedures    Cervical medial branch block    The patient was seen in the preoperative area. The risks, benefits, complications, treatment options, non-operative alternatives, expected recovery and outcomes were discussed with the patient. The patient concurred with the proposed plan, giving informed consent.      Procedure Details:     The patient was placed in prone position in the procedure room. Area overlying the cervical spine  was cleaned with ChloraPrep solution and draped using standard sterile precautions. Skin was anesthetized with 1% lidocaine.  A 3.5 inch 25-gauge spinal needle was advanced using AP and lateral fluoroscopy views to the respective pillars, and lateral views the needle was positioned in the middle of trapezoidal body of respective levels.  The process was repeated for bilateral C5,C6 medial ranch blocks.  Positioning was confirmed by injecting 0.5 mL of Omnipaque which did not reveal any vascular spread.  After negative aspiration 0.5 mL of ropivacaine 0.5% was injected at bilateral C5,C6 medial branch positions.  The patient the procedure without any obvious complications, was transferred to Recovery.          Complications:  None; patient tolerated the procedure well.    Disposition: Home  Condition: stable         Additional Details: NA    Attending Attestation: I performed the procedure.    Bay Tripp MD     "

## 2024-04-04 ENCOUNTER — TELEPHONE (OUTPATIENT)
Dept: PAIN MEDICINE | Facility: CLINIC | Age: 59
End: 2024-04-04
Payer: COMMERCIAL

## 2024-04-08 ENCOUNTER — TELEPHONE (OUTPATIENT)
Dept: PRIMARY CARE | Facility: CLINIC | Age: 59
End: 2024-04-08
Payer: COMMERCIAL

## 2024-04-08 DIAGNOSIS — E78.5 HYPERLIPIDEMIA, UNSPECIFIED HYPERLIPIDEMIA TYPE: ICD-10-CM

## 2024-04-08 NOTE — TELEPHONE ENCOUNTER
Pt here in February & coming for annual in may but needed refill on rosuvastatin 10 mg  90 day to [facility] [phone]  Ty

## 2024-04-09 RX ORDER — ROSUVASTATIN CALCIUM 10 MG/1
10 TABLET, COATED ORAL DAILY
Qty: 90 TABLET | Refills: 0 | Status: SHIPPED | OUTPATIENT
Start: 2024-04-09

## 2024-05-11 NOTE — ADDENDUM NOTE
Encounter addended by: Bay Tripp MD on: 5/10/2024 9:40 PM   Actions taken: Charge Capture section accepted

## 2024-05-15 NOTE — ADDENDUM NOTE
Encounter addended by: Bay Tripp MD on: 5/15/2024 1:22 PM   Actions taken: Order Reconciliation Section accessed, Charge Capture section accepted

## 2024-05-17 ENCOUNTER — OFFICE VISIT (OUTPATIENT)
Dept: PRIMARY CARE | Facility: CLINIC | Age: 59
End: 2024-05-17
Payer: COMMERCIAL

## 2024-05-17 VITALS
TEMPERATURE: 97.5 F | OXYGEN SATURATION: 97 % | WEIGHT: 192 LBS | SYSTOLIC BLOOD PRESSURE: 126 MMHG | DIASTOLIC BLOOD PRESSURE: 64 MMHG | HEART RATE: 62 BPM | HEIGHT: 66 IN | BODY MASS INDEX: 30.86 KG/M2

## 2024-05-17 DIAGNOSIS — E78.5 HYPERLIPIDEMIA, UNSPECIFIED HYPERLIPIDEMIA TYPE: ICD-10-CM

## 2024-05-17 DIAGNOSIS — Z00.00 WELL ADULT EXAM: Primary | ICD-10-CM

## 2024-05-17 DIAGNOSIS — F98.8 ADD (ATTENTION DEFICIT DISORDER) WITHOUT HYPERACTIVITY: ICD-10-CM

## 2024-05-17 DIAGNOSIS — M48.02 SPINAL STENOSIS OF CERVICAL REGION: ICD-10-CM

## 2024-05-17 DIAGNOSIS — F98.8 ATTENTION DEFICIT DISORDER (ADD) WITHOUT HYPERACTIVITY: ICD-10-CM

## 2024-05-17 DIAGNOSIS — F41.8 DEPRESSION WITH ANXIETY: ICD-10-CM

## 2024-05-17 LAB
ALBUMIN SERPL BCP-MCNC: 4.2 G/DL (ref 3.4–5)
ALP SERPL-CCNC: 52 U/L (ref 33–136)
ALT SERPL W P-5'-P-CCNC: 18 U/L (ref 10–52)
AMPHETAMINES UR QL SCN: ABNORMAL
ANION GAP SERPL CALC-SCNC: 13 MMOL/L (ref 10–20)
AST SERPL W P-5'-P-CCNC: 20 U/L (ref 9–39)
BARBITURATES UR QL SCN: ABNORMAL
BASOPHILS # BLD AUTO: 0.05 X10*3/UL (ref 0–0.1)
BASOPHILS NFR BLD AUTO: 1 %
BENZODIAZ UR QL SCN: ABNORMAL
BILIRUB SERPL-MCNC: 0.5 MG/DL (ref 0–1.2)
BUN SERPL-MCNC: 13 MG/DL (ref 6–23)
BZE UR QL SCN: ABNORMAL
CALCIUM SERPL-MCNC: 9 MG/DL (ref 8.6–10.6)
CANNABINOIDS UR QL SCN: ABNORMAL
CHLORIDE SERPL-SCNC: 108 MMOL/L (ref 98–107)
CHOLEST SERPL-MCNC: 157 MG/DL (ref 0–199)
CHOLESTEROL/HDL RATIO: 2.6
CO2 SERPL-SCNC: 26 MMOL/L (ref 21–32)
CREAT SERPL-MCNC: 0.94 MG/DL (ref 0.5–1.3)
EGFRCR SERPLBLD CKD-EPI 2021: >90 ML/MIN/1.73M*2
EOSINOPHIL # BLD AUTO: 0.16 X10*3/UL (ref 0–0.7)
EOSINOPHIL NFR BLD AUTO: 3.3 %
ERYTHROCYTE [DISTWIDTH] IN BLOOD BY AUTOMATED COUNT: 12.7 % (ref 11.5–14.5)
FENTANYL+NORFENTANYL UR QL SCN: ABNORMAL
GLUCOSE SERPL-MCNC: 85 MG/DL (ref 74–99)
HCT VFR BLD AUTO: 48.9 % (ref 41–52)
HDLC SERPL-MCNC: 60.6 MG/DL
HGB BLD-MCNC: 14.8 G/DL (ref 13.5–17.5)
IMM GRANULOCYTES # BLD AUTO: 0.01 X10*3/UL (ref 0–0.7)
IMM GRANULOCYTES NFR BLD AUTO: 0.2 % (ref 0–0.9)
LDLC SERPL CALC-MCNC: 85 MG/DL
LYMPHOCYTES # BLD AUTO: 1.66 X10*3/UL (ref 1.2–4.8)
LYMPHOCYTES NFR BLD AUTO: 34.2 %
MCH RBC QN AUTO: 30.1 PG (ref 26–34)
MCHC RBC AUTO-ENTMCNC: 30.3 G/DL (ref 32–36)
MCV RBC AUTO: 100 FL (ref 80–100)
METHADONE UR QL SCN: ABNORMAL
MONOCYTES # BLD AUTO: 0.35 X10*3/UL (ref 0.1–1)
MONOCYTES NFR BLD AUTO: 7.2 %
NEUTROPHILS # BLD AUTO: 2.63 X10*3/UL (ref 1.2–7.7)
NEUTROPHILS NFR BLD AUTO: 54.1 %
NON HDL CHOLESTEROL: 96 MG/DL (ref 0–149)
NRBC BLD-RTO: 0 /100 WBCS (ref 0–0)
OPIATES UR QL SCN: ABNORMAL
OXYCODONE+OXYMORPHONE UR QL SCN: ABNORMAL
PCP UR QL SCN: ABNORMAL
PLATELET # BLD AUTO: 201 X10*3/UL (ref 150–450)
POTASSIUM SERPL-SCNC: 4.9 MMOL/L (ref 3.5–5.3)
PROT SERPL-MCNC: 6.5 G/DL (ref 6.4–8.2)
PSA SERPL-MCNC: 0.23 NG/ML
RBC # BLD AUTO: 4.91 X10*6/UL (ref 4.5–5.9)
SODIUM SERPL-SCNC: 142 MMOL/L (ref 136–145)
TRIGL SERPL-MCNC: 57 MG/DL (ref 0–149)
VLDL: 11 MG/DL (ref 0–40)
WBC # BLD AUTO: 4.9 X10*3/UL (ref 4.4–11.3)

## 2024-05-17 PROCEDURE — 84153 ASSAY OF PSA TOTAL: CPT

## 2024-05-17 PROCEDURE — 36415 COLL VENOUS BLD VENIPUNCTURE: CPT

## 2024-05-17 PROCEDURE — 80061 LIPID PANEL: CPT

## 2024-05-17 PROCEDURE — 1036F TOBACCO NON-USER: CPT | Performed by: FAMILY MEDICINE

## 2024-05-17 PROCEDURE — 80053 COMPREHEN METABOLIC PANEL: CPT

## 2024-05-17 PROCEDURE — 80324 DRUG SCREEN AMPHETAMINES 1/2: CPT

## 2024-05-17 PROCEDURE — 85025 COMPLETE CBC W/AUTO DIFF WBC: CPT

## 2024-05-17 PROCEDURE — 80307 DRUG TEST PRSMV CHEM ANLYZR: CPT

## 2024-05-17 PROCEDURE — 99396 PREV VISIT EST AGE 40-64: CPT | Performed by: FAMILY MEDICINE

## 2024-05-17 PROCEDURE — 3008F BODY MASS INDEX DOCD: CPT | Performed by: FAMILY MEDICINE

## 2024-05-17 RX ORDER — DEXTROAMPHETAMINE SACCHARATE, AMPHETAMINE ASPARTATE MONOHYDRATE, DEXTROAMPHETAMINE SULFATE AND AMPHETAMINE SULFATE 5; 5; 5; 5 MG/1; MG/1; MG/1; MG/1
20 CAPSULE, EXTENDED RELEASE ORAL DAILY
Qty: 90 CAPSULE | Refills: 0 | Status: SHIPPED | OUTPATIENT
Start: 2024-05-17

## 2024-05-17 ASSESSMENT — PROMIS GLOBAL HEALTH SCALE
EMOTIONAL_PROBLEMS: RARELY
RATE_AVERAGE_PAIN: 3
RATE_MENTAL_HEALTH: VERY GOOD
RATE_AVERAGE_FATIGUE: MILD
CARRYOUT_SOCIAL_ACTIVITIES: EXCELLENT
RATE_GENERAL_HEALTH: GOOD
RATE_PHYSICAL_HEALTH: GOOD
CARRYOUT_PHYSICAL_ACTIVITIES: COMPLETELY
RATE_QUALITY_OF_LIFE: GOOD
RATE_SOCIAL_SATISFACTION: VERY GOOD

## 2024-05-17 ASSESSMENT — ENCOUNTER SYMPTOMS
NECK PAIN: 1
LOSS OF SENSATION IN FEET: 0
OCCASIONAL FEELINGS OF UNSTEADINESS: 0
RESPIRATORY NEGATIVE: 1
NEUROLOGICAL NEGATIVE: 1
CONSTITUTIONAL NEGATIVE: 1
CARDIOVASCULAR NEGATIVE: 1
DEPRESSION: 0
ENDOCRINE NEGATIVE: 1
GASTROINTESTINAL NEGATIVE: 1

## 2024-05-17 ASSESSMENT — PAIN SCALES - GENERAL: PAINLEVEL: 3

## 2024-05-17 NOTE — PROGRESS NOTES
"Subjective   Patient ID: Sivakumar Hall \"Lizette" is a 59 y.o. male who presents for Med Refill (3 month follow up) and Annual Exam (Patient is fasting. ).   3 month med refills  HPI     Review of Systems   Constitutional: Negative.    HENT: Negative.     Respiratory: Negative.     Cardiovascular: Negative.    Gastrointestinal: Negative.    Endocrine: Negative.    Genitourinary: Negative.    Musculoskeletal:  Positive for neck pain.   Neurological: Negative.    Psychiatric/Behavioral:          ADD       Objective   /64 (BP Location: Right arm, Patient Position: Sitting, BP Cuff Size: Adult)   Pulse 62   Temp 36.4 °C (97.5 °F) (Temporal)   Ht 1.676 m (5' 6\")   Wt 87.1 kg (192 lb)   SpO2 97%   BMI 30.99 kg/m²     Physical Exam  Vitals and nursing note reviewed.   Constitutional:       Appearance: Normal appearance.   HENT:      Right Ear: Tympanic membrane normal.      Left Ear: Tympanic membrane normal.      Mouth/Throat:      Pharynx: Oropharynx is clear.   Cardiovascular:      Rate and Rhythm: Normal rate and regular rhythm.      Pulses: Normal pulses.      Heart sounds: Normal heart sounds.   Abdominal:      Palpations: Abdomen is soft.   Musculoskeletal:         General: Normal range of motion.      Comments: Cervical stenosis   Neurological:      General: No focal deficit present.      Mental Status: He is alert and oriented to person, place, and time.   Psychiatric:         Mood and Affect: Mood normal.         Behavior: Behavior normal.         Assessment/Plan patient seen here for annual physical.  Her also refilling his Adderall.  He is having his lab work drawn today.  Let him know the results and is available we will see him back in 3 months.  Problem List Items Addressed This Visit             ICD-10-CM    ADD (attention deficit disorder) without hyperactivity F98.8    Depression with anxiety F41.8    Hyperlipidemia E78.5    Spinal stenosis of cervical region M48.02     Other Visit Diagnoses  "        Codes    Well adult exam    -  Primary Z00.00    Relevant Orders    Prostate Specific Antigen    Lipid Panel    CBC and Auto Differential    Comprehensive Metabolic Panel    Drug Screen, Urine With Reflex to Confirmation    Attention deficit disorder (ADD) without hyperactivity     F98.8    Relevant Medications    amphetamine-dextroamphetamine XR (Adderall XR) 20 mg 24 hr capsule    BMI 30.0-30.9,adult     Z68.30             OARRS:  Solo Andre, DO on 5/17/2024  9:51 AM  I have personally reviewed the OARRS report for Sivakumar Hall. I have considered the risks of abuse, dependence, addiction and diversion    Is the patient prescribed a combination of a benzodiazepine and opioid?  Yes, I feel it is clincially indicated to continue the medication and have discussed with the patient risks/benefits/alternatives.    Last Urine Drug Screen / ordered today: yes  Recent Results (from the past 8760 hour(s))   Drug Screen, Urine With Reflex to Confirmation    Collection Time: 06/01/23  4:24 PM   Result Value Ref Range    DRUG SCREEN COMMENT URINE SEE BELOW     Amphetamine Screen, Urine PRESUMPTIVE NEGATIVE NEGATIVE    Barbiturate Screen, Urine PRESUMPTIVE NEGATIVE NEGATIVE    BENZODIAZEPINE (PRESENCE) IN URINE BY SCREEN METHOD PRESUMPTIVE NEGATIVE NEGATIVE    Cannabinoid Screen, Urine PRESUMPTIVE NEGATIVE NEGATIVE    Cocaine Screen, Urine PRESUMPTIVE NEGATIVE NEGATIVE    Fentanyl, Ur PRESUMPTIVE NEGATIVE NEGATIVE    Methadone Screen, Urine PRESUMPTIVE NEGATIVE NEGATIVE    Opiate Screen, Urine PRESUMPTIVE NEGATIVE NEGATIVE    Oxycodone Screen, Ur PRESUMPTIVE NEGATIVE NEGATIVE    PCP Screen, Urine PRESUMPTIVE NEGATIVE NEGATIVE     N/A    Controlled Substance Agreement:  Date of the Last Agreement: 5/17/24  Reviewed Controlled Substance Agreement including but not limited to the benefits, risks, and alternatives to treatment with a Controlled Substance medication(s).    Stimulants:   What is the patient's goal  of therapy? ADD  Is this being achieved with current treatment? yes    Activities of Daily Living:   Is your overall impression that this patient is benefiting (symptom reduction outweighs side effects) from stimulant therapy? Yes     1. Physical Functioning: Better  2. Family Relationship: Better  3. Social Relationship: Better  4. Mood: Better  5. Sleep Patterns: Better  6. Overall Function: Better

## 2024-05-23 LAB
AMPHET UR-MCNC: 3271 NG/ML
MDA UR-MCNC: <200 NG/ML
MDEA UR-MCNC: <200 NG/ML
MDMA UR-MCNC: <200 NG/ML
METHAMPHET UR-MCNC: <200 NG/ML
PHENTERMINE UR CFM-MCNC: <200 NG/ML

## 2024-05-28 DIAGNOSIS — F32.A ANXIETY AND DEPRESSION: ICD-10-CM

## 2024-05-28 DIAGNOSIS — F41.9 ANXIETY AND DEPRESSION: ICD-10-CM

## 2024-05-28 RX ORDER — DULOXETIN HYDROCHLORIDE 30 MG/1
30 CAPSULE, DELAYED RELEASE ORAL DAILY
Qty: 90 CAPSULE | Refills: 3 | Status: SHIPPED | OUTPATIENT
Start: 2024-05-28 | End: 2025-05-28

## 2024-07-12 DIAGNOSIS — E78.5 HYPERLIPIDEMIA, UNSPECIFIED HYPERLIPIDEMIA TYPE: ICD-10-CM

## 2024-07-12 RX ORDER — ROSUVASTATIN CALCIUM 10 MG/1
10 TABLET, COATED ORAL DAILY
Qty: 90 TABLET | Refills: 0 | Status: SHIPPED | OUTPATIENT
Start: 2024-07-12

## 2024-08-15 ENCOUNTER — HOSPITAL ENCOUNTER (EMERGENCY)
Facility: HOSPITAL | Age: 59
Discharge: HOME | End: 2024-08-15
Attending: EMERGENCY MEDICINE
Payer: COMMERCIAL

## 2024-08-15 ENCOUNTER — APPOINTMENT (OUTPATIENT)
Dept: CARDIOLOGY | Facility: HOSPITAL | Age: 59
End: 2024-08-15
Payer: COMMERCIAL

## 2024-08-15 ENCOUNTER — TELEPHONE (OUTPATIENT)
Dept: PAIN MEDICINE | Facility: CLINIC | Age: 59
End: 2024-08-15

## 2024-08-15 ENCOUNTER — APPOINTMENT (OUTPATIENT)
Dept: PRIMARY CARE | Facility: CLINIC | Age: 59
End: 2024-08-15
Payer: COMMERCIAL

## 2024-08-15 ENCOUNTER — APPOINTMENT (OUTPATIENT)
Dept: RADIOLOGY | Facility: HOSPITAL | Age: 59
End: 2024-08-15
Payer: COMMERCIAL

## 2024-08-15 VITALS
WEIGHT: 191 LBS | HEART RATE: 71 BPM | TEMPERATURE: 97.3 F | BODY MASS INDEX: 30.7 KG/M2 | HEIGHT: 66 IN | SYSTOLIC BLOOD PRESSURE: 144 MMHG | DIASTOLIC BLOOD PRESSURE: 74 MMHG | RESPIRATION RATE: 16 BRPM | OXYGEN SATURATION: 98 %

## 2024-08-15 DIAGNOSIS — M47.812 OSTEOARTHRITIS OF CERVICAL SPINE, UNSPECIFIED SPINAL OSTEOARTHRITIS COMPLICATION STATUS: ICD-10-CM

## 2024-08-15 DIAGNOSIS — R20.2 LEFT HAND PARESTHESIA: Primary | ICD-10-CM

## 2024-08-15 DIAGNOSIS — T14.8XXA MUSCULOSKELETAL STRAIN: ICD-10-CM

## 2024-08-15 DIAGNOSIS — M47.812 CERVICAL SPONDYLOSIS: Primary | ICD-10-CM

## 2024-08-15 LAB
ALBUMIN SERPL BCP-MCNC: 4.2 G/DL (ref 3.4–5)
ALP SERPL-CCNC: 58 U/L (ref 33–136)
ALT SERPL W P-5'-P-CCNC: 25 U/L (ref 10–52)
ANION GAP SERPL CALC-SCNC: 11 MMOL/L (ref 10–20)
AST SERPL W P-5'-P-CCNC: 19 U/L (ref 9–39)
BASOPHILS # BLD AUTO: 0.05 X10*3/UL (ref 0–0.1)
BASOPHILS NFR BLD AUTO: 0.7 %
BILIRUB SERPL-MCNC: 0.3 MG/DL (ref 0–1.2)
BUN SERPL-MCNC: 19 MG/DL (ref 6–23)
CALCIUM SERPL-MCNC: 9.4 MG/DL (ref 8.6–10.3)
CARDIAC TROPONIN I PNL SERPL HS: 4 NG/L (ref 0–20)
CHLORIDE SERPL-SCNC: 108 MMOL/L (ref 98–107)
CO2 SERPL-SCNC: 26 MMOL/L (ref 21–32)
CREAT SERPL-MCNC: 0.94 MG/DL (ref 0.5–1.3)
EGFRCR SERPLBLD CKD-EPI 2021: >90 ML/MIN/1.73M*2
EOSINOPHIL # BLD AUTO: 0.14 X10*3/UL (ref 0–0.7)
EOSINOPHIL NFR BLD AUTO: 2 %
ERYTHROCYTE [DISTWIDTH] IN BLOOD BY AUTOMATED COUNT: 11.6 % (ref 11.5–14.5)
GLUCOSE SERPL-MCNC: 103 MG/DL (ref 74–99)
HCT VFR BLD AUTO: 44.5 % (ref 41–52)
HGB BLD-MCNC: 15.1 G/DL (ref 13.5–17.5)
IMM GRANULOCYTES # BLD AUTO: 0.02 X10*3/UL (ref 0–0.7)
IMM GRANULOCYTES NFR BLD AUTO: 0.3 % (ref 0–0.9)
LYMPHOCYTES # BLD AUTO: 1.74 X10*3/UL (ref 1.2–4.8)
LYMPHOCYTES NFR BLD AUTO: 25.4 %
MAGNESIUM SERPL-MCNC: 2.11 MG/DL (ref 1.6–2.4)
MCH RBC QN AUTO: 32.1 PG (ref 26–34)
MCHC RBC AUTO-ENTMCNC: 33.9 G/DL (ref 32–36)
MCV RBC AUTO: 95 FL (ref 80–100)
MONOCYTES # BLD AUTO: 0.69 X10*3/UL (ref 0.1–1)
MONOCYTES NFR BLD AUTO: 10.1 %
NEUTROPHILS # BLD AUTO: 4.2 X10*3/UL (ref 1.2–7.7)
NEUTROPHILS NFR BLD AUTO: 61.5 %
NRBC BLD-RTO: 0 /100 WBCS (ref 0–0)
PLATELET # BLD AUTO: 230 X10*3/UL (ref 150–450)
POTASSIUM SERPL-SCNC: 4.4 MMOL/L (ref 3.5–5.3)
PROT SERPL-MCNC: 7.1 G/DL (ref 6.4–8.2)
RBC # BLD AUTO: 4.7 X10*6/UL (ref 4.5–5.9)
SODIUM SERPL-SCNC: 141 MMOL/L (ref 136–145)
WBC # BLD AUTO: 6.8 X10*3/UL (ref 4.4–11.3)

## 2024-08-15 PROCEDURE — 99285 EMERGENCY DEPT VISIT HI MDM: CPT | Mod: 25

## 2024-08-15 PROCEDURE — 72125 CT NECK SPINE W/O DYE: CPT | Performed by: STUDENT IN AN ORGANIZED HEALTH CARE EDUCATION/TRAINING PROGRAM

## 2024-08-15 PROCEDURE — 84484 ASSAY OF TROPONIN QUANT: CPT | Performed by: EMERGENCY MEDICINE

## 2024-08-15 PROCEDURE — 85025 COMPLETE CBC W/AUTO DIFF WBC: CPT | Performed by: EMERGENCY MEDICINE

## 2024-08-15 PROCEDURE — 2500000001 HC RX 250 WO HCPCS SELF ADMINISTERED DRUGS (ALT 637 FOR MEDICARE OP): Performed by: EMERGENCY MEDICINE

## 2024-08-15 PROCEDURE — 36415 COLL VENOUS BLD VENIPUNCTURE: CPT | Performed by: EMERGENCY MEDICINE

## 2024-08-15 PROCEDURE — 80053 COMPREHEN METABOLIC PANEL: CPT | Performed by: EMERGENCY MEDICINE

## 2024-08-15 PROCEDURE — 93005 ELECTROCARDIOGRAM TRACING: CPT

## 2024-08-15 PROCEDURE — 70450 CT HEAD/BRAIN W/O DYE: CPT

## 2024-08-15 PROCEDURE — 83735 ASSAY OF MAGNESIUM: CPT | Performed by: EMERGENCY MEDICINE

## 2024-08-15 PROCEDURE — 70450 CT HEAD/BRAIN W/O DYE: CPT | Performed by: STUDENT IN AN ORGANIZED HEALTH CARE EDUCATION/TRAINING PROGRAM

## 2024-08-15 PROCEDURE — 2500000004 HC RX 250 GENERAL PHARMACY W/ HCPCS (ALT 636 FOR OP/ED): Performed by: EMERGENCY MEDICINE

## 2024-08-15 PROCEDURE — 72125 CT NECK SPINE W/O DYE: CPT

## 2024-08-15 PROCEDURE — 96374 THER/PROPH/DIAG INJ IV PUSH: CPT

## 2024-08-15 RX ORDER — OXYCODONE HYDROCHLORIDE 5 MG/1
5 TABLET ORAL ONCE
Status: COMPLETED | OUTPATIENT
Start: 2024-08-15 | End: 2024-08-15

## 2024-08-15 RX ORDER — KETOROLAC TROMETHAMINE 30 MG/ML
15 INJECTION, SOLUTION INTRAMUSCULAR; INTRAVENOUS ONCE
Status: COMPLETED | OUTPATIENT
Start: 2024-08-15 | End: 2024-08-15

## 2024-08-15 RX ORDER — DEXAMETHASONE 6 MG/1
6 TABLET ORAL DAILY
Qty: 4 TABLET | Refills: 0 | Status: SHIPPED | OUTPATIENT
Start: 2024-08-15 | End: 2024-08-19 | Stop reason: SDUPTHER

## 2024-08-15 RX ORDER — METHOCARBAMOL 500 MG/1
1500 TABLET, FILM COATED ORAL 3 TIMES DAILY PRN
Qty: 30 TABLET | Refills: 0 | Status: SHIPPED | OUTPATIENT
Start: 2024-08-15 | End: 2024-08-25

## 2024-08-15 RX ORDER — METHOCARBAMOL 500 MG/1
1000 TABLET, FILM COATED ORAL ONCE
Status: COMPLETED | OUTPATIENT
Start: 2024-08-15 | End: 2024-08-15

## 2024-08-15 RX ORDER — DEXAMETHASONE 6 MG/1
6 TABLET ORAL ONCE
Status: COMPLETED | OUTPATIENT
Start: 2024-08-15 | End: 2024-08-15

## 2024-08-15 RX ADMIN — METHOCARBAMOL 1000 MG: 500 TABLET ORAL at 05:19

## 2024-08-15 RX ADMIN — OXYCODONE HYDROCHLORIDE 5 MG: 5 TABLET ORAL at 06:30

## 2024-08-15 RX ADMIN — KETOROLAC TROMETHAMINE 15 MG: 30 INJECTION, SOLUTION INTRAMUSCULAR at 06:30

## 2024-08-15 RX ADMIN — DEXAMETHASONE 6 MG: 6 TABLET ORAL at 05:19

## 2024-08-15 ASSESSMENT — LIFESTYLE VARIABLES
EVER FELT BAD OR GUILTY ABOUT YOUR DRINKING: NO
EVER HAD A DRINK FIRST THING IN THE MORNING TO STEADY YOUR NERVES TO GET RID OF A HANGOVER: NO
HAVE YOU EVER FELT YOU SHOULD CUT DOWN ON YOUR DRINKING: NO
HAVE PEOPLE ANNOYED YOU BY CRITICIZING YOUR DRINKING: NO
TOTAL SCORE: 0

## 2024-08-15 ASSESSMENT — PAIN SCALES - GENERAL
PAINLEVEL_OUTOF10: 0 - NO PAIN
PAINLEVEL_OUTOF10: 9
PAINLEVEL_OUTOF10: 8

## 2024-08-15 ASSESSMENT — PAIN DESCRIPTION - ORIENTATION: ORIENTATION: LEFT

## 2024-08-15 ASSESSMENT — PAIN - FUNCTIONAL ASSESSMENT
PAIN_FUNCTIONAL_ASSESSMENT: 0-10
PAIN_FUNCTIONAL_ASSESSMENT: 0-10

## 2024-08-15 ASSESSMENT — PAIN DESCRIPTION - DESCRIPTORS: DESCRIPTORS: NUMBNESS;TINGLING

## 2024-08-15 ASSESSMENT — PAIN DESCRIPTION - LOCATION: LOCATION: NECK

## 2024-08-15 ASSESSMENT — PAIN DESCRIPTION - PAIN TYPE: TYPE: ACUTE PAIN

## 2024-08-15 ASSESSMENT — PAIN DESCRIPTION - FREQUENCY: FREQUENCY: CONSTANT/CONTINUOUS

## 2024-08-15 NOTE — TELEPHONE ENCOUNTER
Pt called asking for another inj.   His last inj was 4-3.   Do you need to see him in the office first?  If not, please enter the order and we will get him on the schedule.    Thanks

## 2024-08-15 NOTE — DISCHARGE INSTRUCTIONS
You were seen in the ED for pain behind your left shoulder and tingling in your hand, worse with certain neck movements.  CTH and c-spine showed degenerative changes without severe narrowing.  Given your hand paresthesias, please follow up urgently with neurosurgery (either the doctor of your choice or Dr. Donahue).  Return to the ED for worsened numbness or weakness or other concerns.

## 2024-08-15 NOTE — ED TRIAGE NOTES
Y/o male complains of upper back/neck pain with numbness and tingling that radiates down left arm. Denies injury. States he does have history of neck problems

## 2024-08-16 ENCOUNTER — OFFICE VISIT (OUTPATIENT)
Dept: PRIMARY CARE | Facility: CLINIC | Age: 59
End: 2024-08-16
Payer: COMMERCIAL

## 2024-08-16 ENCOUNTER — TELEPHONE (OUTPATIENT)
Dept: PAIN MEDICINE | Facility: CLINIC | Age: 59
End: 2024-08-16

## 2024-08-16 ENCOUNTER — DOCUMENTATION (OUTPATIENT)
Dept: PRIMARY CARE | Facility: CLINIC | Age: 59
End: 2024-08-16

## 2024-08-16 VITALS
DIASTOLIC BLOOD PRESSURE: 90 MMHG | BODY MASS INDEX: 30.98 KG/M2 | SYSTOLIC BLOOD PRESSURE: 142 MMHG | HEART RATE: 97 BPM | OXYGEN SATURATION: 92 % | HEIGHT: 66 IN | TEMPERATURE: 97.6 F | WEIGHT: 192.8 LBS

## 2024-08-16 DIAGNOSIS — F98.8 ADD (ATTENTION DEFICIT DISORDER) WITHOUT HYPERACTIVITY: Primary | ICD-10-CM

## 2024-08-16 DIAGNOSIS — F98.8 ATTENTION DEFICIT DISORDER (ADD) WITHOUT HYPERACTIVITY: ICD-10-CM

## 2024-08-16 DIAGNOSIS — M48.02 SPINAL STENOSIS OF CERVICAL REGION: ICD-10-CM

## 2024-08-16 LAB
ATRIAL RATE: 69 BPM
P AXIS: 63 DEGREES
PR INTERVAL: 150 MS
Q ONSET: 253 MS
QRS COUNT: 11 BEATS
QRS DURATION: 106 MS
QT INTERVAL: 402 MS
QTC CALCULATION(BAZETT): 431 MS
QTC FREDERICIA: 421 MS
R AXIS: 67 DEGREES
T AXIS: 65 DEGREES
T OFFSET: 454 MS
VENTRICULAR RATE: 69 BPM

## 2024-08-16 PROCEDURE — 1036F TOBACCO NON-USER: CPT | Performed by: FAMILY MEDICINE

## 2024-08-16 PROCEDURE — 99213 OFFICE O/P EST LOW 20 MIN: CPT | Performed by: FAMILY MEDICINE

## 2024-08-16 PROCEDURE — 3008F BODY MASS INDEX DOCD: CPT | Performed by: FAMILY MEDICINE

## 2024-08-16 RX ORDER — DEXTROAMPHETAMINE SACCHARATE, AMPHETAMINE ASPARTATE MONOHYDRATE, DEXTROAMPHETAMINE SULFATE AND AMPHETAMINE SULFATE 5; 5; 5; 5 MG/1; MG/1; MG/1; MG/1
20 CAPSULE, EXTENDED RELEASE ORAL DAILY
Qty: 90 CAPSULE | Refills: 0 | Status: SHIPPED | OUTPATIENT
Start: 2024-08-16

## 2024-08-16 RX ORDER — DEXTROAMPHETAMINE SACCHARATE, AMPHETAMINE ASPARTATE MONOHYDRATE, DEXTROAMPHETAMINE SULFATE AND AMPHETAMINE SULFATE 5; 5; 5; 5 MG/1; MG/1; MG/1; MG/1
20 CAPSULE, EXTENDED RELEASE ORAL DAILY
Qty: 90 CAPSULE | Refills: 0 | Status: SHIPPED | OUTPATIENT
Start: 2024-08-16 | End: 2024-08-16 | Stop reason: SDUPTHER

## 2024-08-16 ASSESSMENT — ENCOUNTER SYMPTOMS
NAUSEA: 0
FATIGUE: 0
ALTERED MENTAL STATUS: 0
VISUAL CHANGE: 0
DIAPHORESIS: 0
BACK PAIN: 1
LOSS OF BALANCE: 0
LOSS OF SENSATION IN FEET: 0
FOCAL SENSORY LOSS: 1
VOMITING: 0
LIGHT-HEADEDNESS: 0
PALPITATIONS: 0
FEVER: 0
DIZZINESS: 0
NEAR-SYNCOPE: 0
CONFUSION: 0
NECK PAIN: 1
NEUROLOGIC COMPLAINT: 1
DEPRESSION: 0
HEADACHES: 0
CLUMSINESS: 0
SHORTNESS OF BREATH: 0
FOCAL WEAKNESS: 1
SLURRED SPEECH: 0
VERTIGO: 0
WEAKNESS: 0
MEMORY LOSS: 0
BOWEL INCONTINENCE: 0
OCCASIONAL FEELINGS OF UNSTEADINESS: 0
ABDOMINAL PAIN: 0
AURA: 0

## 2024-08-16 ASSESSMENT — PAIN SCALES - GENERAL: PAINLEVEL: 7

## 2024-08-16 NOTE — PROGRESS NOTES
"Subjective   Patient ID: Sivakumar Hall \"Lizette" is a 59 y.o. male who presents for Follow-up (Follow up med refills discuss shoulder and back pain ).   3 month med refills  Acute Neurological Problem  The patient's primary symptoms include focal sensory loss and focal weakness. The patient's pertinent negatives include no altered mental status, clumsiness, loss of balance, memory loss, near-syncope, slurred speech, syncope, visual change or weakness. This is a recurrent problem. The current episode started more than 1 year ago. The problem has been waxing and waning since onset. There was left-sided and upper extremity focality noted. Associated symptoms include back pain and neck pain. Pertinent negatives include no abdominal pain, auditory change, aura, bladder incontinence, bowel incontinence, chest pain, confusion, diaphoresis, dizziness, fatigue, fever, headaches, light-headedness, nausea, palpitations, shortness of breath, vertigo or vomiting. Past treatments include acetaminophen, medication and neck support. The treatment provided no relief.        Review of Systems   Constitutional:  Negative for diaphoresis, fatigue and fever.   Respiratory:  Negative for shortness of breath.    Cardiovascular:  Negative for chest pain, palpitations and near-syncope.   Gastrointestinal:  Negative for abdominal pain, bowel incontinence, nausea and vomiting.   Genitourinary:  Negative for bladder incontinence.   Musculoskeletal:  Positive for back pain and neck pain.   Neurological:  Positive for focal weakness. Negative for dizziness, vertigo, syncope, weakness, light-headedness, headaches and loss of balance.   Psychiatric/Behavioral:  Negative for confusion and memory loss.        Objective   /90 (BP Location: Left arm)   Pulse 97   Temp 36.4 °C (97.6 °F) (Temporal)   Ht 1.676 m (5' 6\")   Wt 87.5 kg (192 lb 12.8 oz)   SpO2 92%   BMI 31.12 kg/m²     Physical Exam  Vitals and nursing note reviewed. "   Constitutional:       Appearance: Normal appearance.   Musculoskeletal:      Comments: Cervical disc disease   Neurological:      Mental Status: He is alert and oriented to person, place, and time.   Psychiatric:         Mood and Affect: Mood normal.         Behavior: Behavior normal.         Assessment/Plan patient seen here to follow-up on his attention deficit disorder.  We are refilling his meds.  He also was in the emergency room with some cervical radiculopathy.  He is on steroids and a muscle relaxer.  He is getting an appointment with pain management.  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 I have considered the risks of abuse, dependence, addiction and diversion    Is the patient prescribed a combination of a benzodiazepine and opioid?  No    Last Urine Drug Screen / ordered today: No  Recent Results (from the past 8760 hour(s))   Amphetamine Confirm, Urine    Collection Time: 05/17/24 10:01 AM   Result Value Ref Range    Methamphetamine Quant, Ur <200 ng/mL    MDA, Urine <200 ng/mL    MDEA, Urine <200 ng/mL    Phentermine,Urine <200 ng/mL    Amphetamines,Urine 3271 ng/mL    MDMA, Urine <200 ng/mL   Drug Screen, Urine With Reflex to Confirmation    Collection Time: 05/17/24 10:01 AM   Result Value Ref Range    Amphetamine Screen, Urine Presumptive Positive (A) Presumptive Negative    Barbiturate Screen, Urine Presumptive Negative Presumptive Negative    Benzodiazepines Screen, Urine Presumptive Negative Presumptive Negative    Cannabinoid Screen, Urine Presumptive  Negative Presumptive Negative    Cocaine Metabolite Screen, Urine Presumptive Negative Presumptive Negative    Fentanyl Screen, Urine Presumptive Negative Presumptive Negative    Opiate Screen, Urine Presumptive Negative Presumptive Negative    Oxycodone Screen, Urine Presumptive Negative Presumptive Negative    PCP Screen, Urine Presumptive Negative Presumptive Negative    Methadone Screen, Urine Presumptive Negative Presumptive Negative     Results are as expected.     Controlled Substance Agreement:  Date of the Last Agreement: 5/17/24  Reviewed Controlled Substance Agreement including but not limited to the benefits, risks, and alternatives to treatment with a Controlled Substance medication(s).    Stimulants:   What is the patient's goal of therapy? ADD  Is this being achieved with current treatment? yes    Activities of Daily Living:   Is your overall impression that this patient is benefiting (symptom reduction outweighs side effects) from stimulant therapy? Yes     1. Physical Functioning: Better  2. Family Relationship: Better  3. Social Relationship: Better  4. Mood: Better  5. Sleep Patterns: Better  6. Overall Function: Better

## 2024-08-16 NOTE — ED PROVIDER NOTES
HPI   Chief Complaint   Patient presents with    Neck Pain    Shoulder Pain    Numbness       This is a 59yM HLD ADHD cervical radiculopathy who present with 1 week of intermittent L hand paresthesias.  Patient reports intermittent tingling in his left hand, worse when he bends his neck.  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  He also complains of numbness when he flexes his neck, which he has had before but is more pronounced.  He has a pain management physician who has given him injections before and has a spine specialist with whom he has not followed up in a while.              Patient History   No past medical history on file.  No past surgical history on file.  Family History   Problem Relation Name Age of Onset    Prostate cancer Father       Social History     Tobacco Use    Smoking status: Never    Smokeless tobacco: Never   Substance Use Topics    Alcohol use: Never    Drug use: Never       Physical Exam   ED Triage Vitals [08/15/24 0247]   Temperature Heart Rate Respirations BP   36.1 °C (97 °F) 79 16 143/90      Pulse Ox Temp Source Heart Rate Source Patient Position   97 % Temporal Monitor Sitting      BP Location FiO2 (%)     Right arm --       Physical Exam  Vitals and nursing note reviewed.   Constitutional:       General: He is not in acute distress.     Appearance: Normal appearance. He is normal weight. He is not ill-appearing, toxic-appearing or diaphoretic.   HENT:      Head: Normocephalic and atraumatic.      Nose: Nose normal. No congestion.      Mouth/Throat:      Mouth: Mucous membranes are moist.      Pharynx: Oropharynx is clear.   Eyes:      General: No scleral icterus.        Right eye: No discharge.         Left eye: No discharge.      Extraocular Movements: Extraocular movements intact.       Conjunctiva/sclera: Conjunctivae normal.   Cardiovascular:      Rate and Rhythm: Normal rate and regular rhythm.      Heart sounds: No murmur heard.     No friction rub. No gallop.   Pulmonary:      Effort: Pulmonary effort is normal. No respiratory distress.      Breath sounds: Normal breath sounds. No stridor. No wheezing, rhonchi or rales.   Abdominal:      General: There is no distension.      Palpations: Abdomen is soft.      Tenderness: There is no abdominal tenderness. There is no guarding or rebound.   Musculoskeletal:         General: No swelling, tenderness, deformity or signs of injury. Normal range of motion.      Cervical back: Normal range of motion and neck supple. No rigidity or tenderness.   Skin:     General: Skin is warm and dry.      Coloration: Skin is not jaundiced or pale.      Findings: No bruising, erythema, lesion or rash.   Neurological:      General: No focal deficit present.      Mental Status: He is alert and oriented to person, place, and time.      Cranial Nerves: No cranial nerve deficit.      Sensory: No sensory deficit.      Motor: No weakness.      Coordination: Coordination normal.   Psychiatric:         Mood and Affect: Mood normal.         Behavior: Behavior normal.           ED Course & MDM   ED Course as of 08/16/24 0620   Thu Aug 15, 2024   0501 ECG 12 lead  EKG reviewed by me NSR normal axis normal intervals no ischemic changes [EK]   0502 CT head wo IV contrast  CTH reviewed by me without ICH or MLS.  Imaging read by radiology without ICH or subacute stroke, with evidence of prior ACDF C6-7, multilevel degenerative changes with moderate to severe loss of disc height at C5-6 and only mild canal and foraminal stenosis due to disc osteophyte and joint changes. [EK]      ED Course User Index  [EK] Elyse H Klerman, MD         Diagnoses as of 08/16/24 0620   Left hand paresthesia   Osteoarthritis of cervical spine, unspecified spinal osteoarthritis complication status    Musculoskeletal strain                 No data recorded     [place] Coma Scale Score: 15 (08/15/24 0250 : Марина Watkins RN)                           Medical Decision Making  59yM HLD ADHD cervical radiculopathy presents with intermittent L hand paresthesias.  Patient well appearing and mildly hypertensive without focal neuro deficits on ED assessment.  EKG without ischemia or arrhythmia.  CT c-spine without acute fx or dislocation and does show multilevel degenerative changes but without severe canal/foraminal stenosis.  Discussed with patient - recommend conservative management from the ED with steroids and muscle relaxants and urgent outpatient pain management (for injections/further symptoms control) and spine follow up (further imaging and intervention as indicated) and careful return precautions.    Amount and/or Complexity of Data Reviewed  Radiology: ordered.  ECG/medicine tests: ordered and independent interpretation performed. Decision-making details documented in ED Course.        Procedure  Procedures     Elyse H Klerman, MD  08/18/24 4266

## 2024-08-19 DIAGNOSIS — M47.812 OSTEOARTHRITIS OF CERVICAL SPINE, UNSPECIFIED SPINAL OSTEOARTHRITIS COMPLICATION STATUS: ICD-10-CM

## 2024-08-19 RX ORDER — DEXAMETHASONE 6 MG/1
6 TABLET ORAL DAILY
Qty: 4 TABLET | Refills: 0 | Status: SHIPPED | OUTPATIENT
Start: 2024-08-19 | End: 2024-08-23

## 2024-08-28 ENCOUNTER — TELEPHONE (OUTPATIENT)
Dept: PAIN MEDICINE | Facility: CLINIC | Age: 59
End: 2024-08-28
Payer: COMMERCIAL

## 2024-08-30 ENCOUNTER — HOSPITAL ENCOUNTER (OUTPATIENT)
Dept: PAIN MEDICINE | Facility: CLINIC | Age: 59
Discharge: HOME | End: 2024-08-30
Payer: COMMERCIAL

## 2024-08-30 VITALS
RESPIRATION RATE: 14 BRPM | WEIGHT: 192 LBS | DIASTOLIC BLOOD PRESSURE: 79 MMHG | OXYGEN SATURATION: 98 % | SYSTOLIC BLOOD PRESSURE: 178 MMHG | HEART RATE: 63 BPM | HEIGHT: 66 IN | TEMPERATURE: 97 F | BODY MASS INDEX: 30.86 KG/M2

## 2024-08-30 DIAGNOSIS — M47.812 CERVICAL SPONDYLOSIS: ICD-10-CM

## 2024-08-30 DIAGNOSIS — M47.812 SPONDYLOSIS OF CERVICAL REGION WITHOUT MYELOPATHY OR RADICULOPATHY: Primary | ICD-10-CM

## 2024-08-30 PROCEDURE — 2500000004 HC RX 250 GENERAL PHARMACY W/ HCPCS (ALT 636 FOR OP/ED): Performed by: ANESTHESIOLOGY

## 2024-08-30 PROCEDURE — 2720000007 HC OR 272 NO HCPCS

## 2024-08-30 RX ORDER — FENTANYL CITRATE 50 UG/ML
50 INJECTION, SOLUTION INTRAMUSCULAR; INTRAVENOUS ONCE
Status: COMPLETED | OUTPATIENT
Start: 2024-08-30 | End: 2024-08-30

## 2024-08-30 RX ORDER — SODIUM CHLORIDE, SODIUM LACTATE, POTASSIUM CHLORIDE, CALCIUM CHLORIDE 600; 310; 30; 20 MG/100ML; MG/100ML; MG/100ML; MG/100ML
20 INJECTION, SOLUTION INTRAVENOUS CONTINUOUS
OUTPATIENT
Start: 2024-08-30

## 2024-08-30 RX ORDER — MIDAZOLAM HYDROCHLORIDE 1 MG/ML
2 INJECTION INTRAMUSCULAR; INTRAVENOUS ONCE
Status: COMPLETED | OUTPATIENT
Start: 2024-08-30 | End: 2024-08-30

## 2024-08-30 RX ORDER — DIAZEPAM 5 MG/1
10 TABLET ORAL
Qty: 2 TABLET | Refills: 0 | Status: SHIPPED | OUTPATIENT
Start: 2024-08-30 | End: 2024-08-30

## 2024-08-30 ASSESSMENT — PAIN - FUNCTIONAL ASSESSMENT
PAIN_FUNCTIONAL_ASSESSMENT: 0-10

## 2024-08-30 ASSESSMENT — ENCOUNTER SYMPTOMS
DEPRESSION: 0
OCCASIONAL FEELINGS OF UNSTEADINESS: 0
SHORTNESS OF BREATH: 0
FEVER: 0
NECK PAIN: 1
LOSS OF SENSATION IN FEET: 0

## 2024-08-30 ASSESSMENT — PAIN SCALES - GENERAL
PAINLEVEL_OUTOF10: 5 - MODERATE PAIN
PAINLEVEL_OUTOF10: 8
PAINLEVEL_OUTOF10: 8
PAINLEVEL_OUTOF10: 5 - MODERATE PAIN

## 2024-08-30 NOTE — H&P
"History Of Present Illness  Sivakumar Hall \"Lizette" is a 59 y.o. male presenting  for cervical facet RFA     Past Medical History  No past medical history on file.    Surgical History  No past surgical history on file.     Social History  He reports that he has never smoked. He has never used smokeless tobacco. He reports that he does not drink alcohol and does not use drugs.    Family History  Family History   Problem Relation Name Age of Onset    Prostate cancer Father          Allergies  Patient has no known allergies.    Review of Systems   Constitutional:  Negative for fever.   Respiratory:  Negative for shortness of breath.    Cardiovascular:  Negative for chest pain.   Musculoskeletal:  Positive for neck pain.        Physical Exam  Cardiovascular:      Rate and Rhythm: Normal rate.   Pulmonary:      Effort: Pulmonary effort is normal.   Neurological:      Mental Status: He is alert and oriented to person, place, and time.          Last Recorded Vitals  Blood pressure 131/82, pulse 64, temperature 36.1 °C (97 °F), resp. rate 15, height 1.676 m (5' 6\"), weight 87.1 kg (192 lb), SpO2 97%.      Assessment/Plan       Patient does not have any contraindications for the planned procedure.      Bay Tripp MD    "

## 2024-08-30 NOTE — Clinical Note
Patient Vaso Vagal during case causing hypotension and decreased HR. Patient C/O feeling faint with diaphoresis noted. Patient placed in Trendelenburg position. After a few Minuets Patient said he was feeling better, B/P and HR improved. Patient moved over to Phase I recovery. Report to Prema LAM

## 2024-09-17 DIAGNOSIS — M51.26 HERNIATED LUMBAR INTERVERTEBRAL DISC: ICD-10-CM

## 2024-09-17 RX ORDER — GABAPENTIN 300 MG/1
300 CAPSULE ORAL NIGHTLY
Qty: 90 CAPSULE | Refills: 0 | Status: SHIPPED | OUTPATIENT
Start: 2024-09-17

## 2024-09-18 ENCOUNTER — APPOINTMENT (OUTPATIENT)
Dept: PAIN MEDICINE | Facility: CLINIC | Age: 59
End: 2024-09-18
Payer: COMMERCIAL

## 2024-10-14 ENCOUNTER — OFFICE VISIT (OUTPATIENT)
Facility: CLINIC | Age: 59
End: 2024-10-14
Payer: COMMERCIAL

## 2024-10-14 VITALS
HEART RATE: 61 BPM | HEIGHT: 66 IN | TEMPERATURE: 97.6 F | DIASTOLIC BLOOD PRESSURE: 82 MMHG | WEIGHT: 200.4 LBS | SYSTOLIC BLOOD PRESSURE: 142 MMHG | BODY MASS INDEX: 32.21 KG/M2

## 2024-10-14 DIAGNOSIS — M51.26 HERNIATED LUMBAR INTERVERTEBRAL DISC: ICD-10-CM

## 2024-10-14 DIAGNOSIS — M54.12 CERVICAL RADICULOPATHY: Primary | ICD-10-CM

## 2024-10-14 ASSESSMENT — PATIENT HEALTH QUESTIONNAIRE - PHQ9
1. LITTLE INTEREST OR PLEASURE IN DOING THINGS: NOT AT ALL
2. FEELING DOWN, DEPRESSED OR HOPELESS: NOT AT ALL
SUM OF ALL RESPONSES TO PHQ9 QUESTIONS 1 & 2: 0

## 2024-10-14 ASSESSMENT — PAIN SCALES - GENERAL: PAINLEVEL: 8

## 2024-10-14 NOTE — PROGRESS NOTES
[facility] [place]  Department of Neurological Surgery  New Patient Visit    History of Present Illness:  Sivakumar Hall is a 59 y.o. year old male who presents to the spine clinic with low back, neck, and left hand pain. He is here as as second opinion. He has intractable neck pain radiating into left hand and posterior neck, and occasionally in the C6 pattern of the left arm. He has dense numbness in the left hand in all five digits. He was in the ER 6-8 weeks ago for 10/10 neck pain. He has attempted conservative care with PT, medications, and lifestyle modifications. Had a recent medial bundle branch block and attempted for RFA but that was aborted; received no improvement from the block.    Prior Spine Surgeries:   C6-7 ACDF at [facility] 10-15 years ago  L4-5 discectomy    Physical Therapy: yes    Diabetic: no     Osteoporosis: no  No DXA results found for the past 12 months    Patient's BMI is Body mass index is 32.35 kg/m².    Review of Systems:  14/14 systems reviewed and negative other than what is listed in the history of present illness    Patient Active Problem List   Diagnosis    ADD (attention deficit disorder) without hyperactivity    Cervical neuropathy    Depression with anxiety    Hyperlipidemia    Impingement syndrome of both shoulders    Strain of lumbar region    Displacement of lumbar intervertebral disc without myelopathy    Herniated lumbar intervertebral disc    Neck pain    Chronic midline low back pain without sciatica    Spondylosis of cervical region without myelopathy or radiculopathy    Spinal stenosis of cervical region     No past medical history on file.  No past surgical history on file.  Social History     Tobacco Use    Smoking status: Never    Smokeless tobacco: Never   Substance Use Topics    Alcohol use: Never     family history includes Prostate cancer in his father.    Current Outpatient Medications:     DULoxetine (Cymbalta) 30 mg DR capsule, Take 1  capsule (30 mg) by mouth once daily., Disp: 90 capsule, Rfl: 3    gabapentin (Neurontin) 300 mg capsule, TAKE ONE CAPSULE BY MOUTH EVERY DAY AT BEDTIME, Disp: 90 capsule, Rfl: 0    rosuvastatin (Crestor) 10 mg tablet, Take 1 tablet (10 mg) by mouth once daily., Disp: 90 tablet, Rfl: 0    amphetamine-dextroamphetamine XR (Adderall XR) 20 mg 24 hr capsule, Take 1 capsule (20 mg) by mouth once daily. (Patient not taking: Reported on 10/14/2024), Disp: 90 capsule, Rfl: 0    dexAMETHasone (Decadron) 6 mg tablet, Take 1 tablet (6 mg) by mouth once daily for 4 days., Disp: 4 tablet, Rfl: 0    diazePAM (Valium) 5 mg tablet, Take 2 tablets (10 mg) by mouth 1 time if needed for anxiety for up to 1 dose., Disp: 2 tablet, Rfl: 0    hydrocortisone (Proctozone-HC) 2.5 % rectal cream, Apply rectally twice daily as needed, Disp: , Rfl:     ketoconazole (NIZOral) 2 % shampoo, Wash the scalp daily when flared, or 3 times per week for maintenance. Lather and let sit for 3-5 minutes before rinsing., Disp: , Rfl:     methocarbamol (Robaxin) 500 mg tablet, Take 3 tablets (1,500 mg) by mouth 3 times a day as needed for muscle spasms for up to 10 days., Disp: 30 tablet, Rfl: 0  No Known Allergies    Physical Examination    General: Well developed, awake/alert/oriented x3, no distress, alert and cooperative  Skin: Warm and dry, no lesions, no rashes  ENMT: Mucous membranes moist, no apparent injury, no lesions seen  Head/Neck: Neck Supple, no apparent injury  Respiratory/Thorax: Normal breath sounds with good chest expansion, thorax symmetric  Cardiovascular: No pitting edema, no JVD    Motor Strength: 5/5 Throughout all extremities    Muscle Bulk: Normal and symmetric in all extremities    Posture:   -- Cervical: Normal  -- Thoracic: Normal  -- Lumbar : Normal  Paraspinal muscle spasm/tenderness absent.     Sensation: intact to light touch    Intermittent C6 radiculopathy  Neck pain    Results    I personally reviewed and interpreted the  imaging results which included MRI C spine showing severe degenerative disc disease at C5-6 above prior C6-7 ACDF.    Assessment and Plan:    Sivakumar Hall is a 59 y.o. year old male who presents to the spine clinic with low back, neck, and left hand pain. He is here as as second opinion. He has intractable neck pain radiating into left hand and posterior neck, and occasionally in the C6 pattern of the left arm. He has dense numbness in the left hand in all five digits. He was in the ER 6-8 weeks ago for 10/10 neck pain. He has attempted conservative care with PT, medications, and lifestyle modifications. Had a recent medial bundle branch block and attempted for RFA but that was aborted; received no improvement from the block.     Pt has right vocal cord questionable paralysis on CT. I will be performing the surgery with ENT.  Pt also has chronic low back pain. History of L4-5 discectomy in the past. He has not performed therapy for his low back and I counseled him on doing so. If this does not help, I will order a new MRI of the lumbar spine for evaluation.      I have reviewed imaging and diagnosis with the patient, discussed the natural history of their disease and both non-operative and operative treatments available and rationale vs risks for both.    The patient's clinical symptoms correlates well with the radiological findings. [He/She] has been having significant functional impairment with decreased ability to perform her normal activities of daily living. They have tried treatment options including medications (NSAIDs/narcotics/muscle relaxants/membrane stabilizers), formal physical therapy, and injections.     I offered the option of surgery that would consist of an C5-6 anterior cervical discectomy and fusion with extension revision of plating C5-7.     I have explained the surgical procedure in detail with expected duration and extent of recovery along risks of surgery that include, but is not limited  to bleeding, infection, blood vessel injury or damage, loss of sensation, loss of bladder, bowel or sexual function, nerve injury/damage resulting in weakness/paralysis, malunion, nonunion, CSF leak, brachial plexus injury, peripheral vision blindness, failure of implants/fusion, failure to relieve symptoms, recurrent disease, adjacent segment disease, need to reoperate for any reason and general anesthesia reaction such as stroke, coma, heart attack, delirium, confusion, death as well as worsening of preexisted medical conditions. I have also discussed with the patient the chances of C5 palsy.     I clearly emphasized that while the goal of surgery is to decompress the spinal cord so as to ARREST the progression of neurological deficits - preexisting deficits may or may not improve after surgery. We discussed that up to 90% of patients do show clinically significant improvement in functional and neurological outcomes following decompression of the spinal cord in patients with cervical degenerative myelopathy or radiculopathy the extent of which is variable and depends on the severity of myelopathy, duration of deficits and age of the patient.    All questions were answered and the patient left satisfied with the surgical plan moving forward.     I have reviewed all prior documentation and reviewed the electronic medical record since admission. I have personally have reviewed all advanced imaging not just the reports and used my interpretation as documented as the relevant findings. I have reviewed the risks and benefits of all treatment recommendations listed in this note with the patient and family.       The above clinical summary has been dictated with voice recognition software. It has not been proofread for grammatical errors, typographical mistakes, or other semantic inconsistencies.    Thank you for visiting our office today. It was our pleasure to take part in your healthcare.     Do not hesitate to call  with any questions regarding your plan of care after leaving at [phone] M-F 8am-4pm.     To clinicians, thank you very much for this kind referral. It is a privilege to partner with you in the care of your patients. My office would be delighted to assist you with any further consultations or with questions regarding the plan of care outlined. Do not hesitate to call the office or contact me directly.       Sincerely,      Moshe Donahue MD, [facility]  Spine , [facility]  Keyshawn Suresh Chair in Spinal Neurosurgery  Complex Spine Surgery Fellowship Director   of Neurological Surgery  [facility] [employer]  Phone: [phone]  Fax: [phone]        Scribe Attestation  By signing my name below, I Nanda Vale , Scrcarla   attest that this documentation has been prepared under the direction and in the presence of Moshe Donahue MD.

## 2024-10-21 DIAGNOSIS — E78.5 HYPERLIPIDEMIA, UNSPECIFIED HYPERLIPIDEMIA TYPE: ICD-10-CM

## 2024-10-21 RX ORDER — ROSUVASTATIN CALCIUM 10 MG/1
10 TABLET, COATED ORAL DAILY
Qty: 90 TABLET | Refills: 0 | Status: SHIPPED | OUTPATIENT
Start: 2024-10-21

## 2024-10-28 ENCOUNTER — APPOINTMENT (OUTPATIENT)
Dept: ORTHOPEDIC SURGERY | Facility: CLINIC | Age: 59
End: 2024-10-28
Payer: COMMERCIAL

## 2024-11-11 ENCOUNTER — EVALUATION (OUTPATIENT)
Dept: PHYSICAL THERAPY | Facility: CLINIC | Age: 59
End: 2024-11-11
Payer: COMMERCIAL

## 2024-11-11 DIAGNOSIS — M51.26 HERNIATED LUMBAR INTERVERTEBRAL DISC: ICD-10-CM

## 2024-11-11 PROCEDURE — 97161 PT EVAL LOW COMPLEX 20 MIN: CPT | Mod: GP | Performed by: PHYSICAL THERAPIST

## 2024-11-11 PROCEDURE — 97110 THERAPEUTIC EXERCISES: CPT | Mod: GP | Performed by: PHYSICAL THERAPIST

## 2024-11-11 NOTE — PROGRESS NOTES
Physical Therapy Evaluation    Patient Name: Sivakumar Hall  MRN: [id number]  Today's Date: 11/11/2024  Time Calculation  Start Time: 0940  Stop Time: 1020  Time Calculation (min): 40 min     Insurance:  Visit number #1  Insurance Type: Payor: ANTHEM / Plan: ANTHEM HMP / Product Type: *No Product type* /   Authorization or Plan of Care date Range: sent auth    Problem List Items Addressed This Visit             ICD-10-CM    Herniated lumbar intervertebral disc M51.26    Relevant Orders    Follow Up In Physical Therapy     General:  Reason for visit: low back pain   Referred by: MD Candace Strickland MD appt: nothing scheduled     Precautions:  Will have C5/6 fusion next year  Fall Risk: Low     Assessment:   Sivakumar Hall  is a 59 y.o. old patient who participated in a physical therapy evaluation today due to low back pain.     Sivakumar presents with signs and symptoms consistent with DDD/DJD.     Sivakumar's impairments include: postural deficits, pain, decreased range of motion, and decreased functional mobility.       Due to these impairments, he has the following functional limitations and participation restrictions: difficulty with ambulation, difficulty performing household activities, difficulty performing occupational activities, difficulty with sleeping, difficulty with completing/performing some ADLs/IADLs, and increased time to complete ADLs/IADLs.     Sivakumar's clinical presentation is Stable and/or uncomplicated characteristics with the level of complexity being low.     Sivakumar's potential for rehab is good.      Skilled physical therapy services are appropriate and beneficial in order to achieve measurable and meaningful change in the objective tests and measures. Utilization of skilled physical therapy services will aid in advancing his functional status and attaining his therapy-related goals.     Sivakumar verbalized understanding and is in agreement with all goals and plan of care.  Sivakumar left  session with all questions answered and no increase in symptoms.        Plan:  1x per week for 6 weeks    Recommended Treatment:  Therapeutic exercise, Manual therapy, Gait training, Home program instruction and progression, Neuromuscular re-education, Therapeutic activities, Self care and home management, Instruction in activity modification, Electrical stimulation, and Cryotherapy    Goals:  -Patient to be Indep with HEP for self management of symptoms    -Abolish LE radiating/radicular symptoms    -Modified Oswestry: 0-19% impairment to indicate a significant improvement in overall function.    -Patient will demonstrate correct posture with min to no cueing to allow for correct loading strategy.    -Patient will demonstrate 5/5 hip strength to allow for correct gait and stair mechanics.     -Patient will demo mild to no limitation AROM of the lumbar spine to allow for correct mechanics with functional mobility.     -Patient will report standing 60 minutes  without pain to allow for return to work and ADLs without limitation.     -Patient will report sitting 60 minutes without pain to allow for return to work and ADLs without limitation.    -Patient will demonstrate appropriate body mechanics for household and common activities to reduce incidence or future back pain exacerbations     Subjective:  - CC: Patient arrives with complaint of lumbar spine pain. Mostly in center low back, does not radiate. Dealing with cervical issues with L sided radiculopathy to the hand and will be having surgery next Jan.   - DOI: no injury, pain increased over a year  - DOS: n/a  - TERRI: increased over the past year  - IMAGING: xrays   - PAIN: CURRENT: (5/10); BEST: (5/10); WORST: (9/10); LOCATION: (center low back)  - ALLEVIATES PAIN: TENS unit at home  - EXACERBATES PAIN: bending forward and lifting, prolonged standing in one spot, sleeping  - CURRENT MEDICAL MANAGEMENT: gabapentin, advil/tylenol as needed   - PLOF: 2012 lumbar  "discectomy  - FUNCTIONAL LIMITATIONS: Lifting, Sleep is interrupted., Working , Walking > 60 minutes, Standing > 60 minutes, and Sitting > 30 minutes  - LIVING ENVIRONMENT: no barriers  - WORK:   - EXERCISE: treadmill and recumbent bike  - PATIENT'S GOAL: decrease pain    Medical History Form: Reviewed (scanned into chart)    Objective:   Denies: Loss of bladder or bowel function Change in bowel / bladder function that correlates with onset of LBP Saddle anesthesia History of malignancy Osteoporosis    Gait: The patient is ambulating with the following device: he is not using a device.. Gait deviations include: Moderately antalgic.    LE DERMATOMES:  Lower extremity sensation is intact.    LE MYOTOMES:  Lower extremity myotomes are WNL bilaterally.      LUMBAR ROM;   LUMBAR TEST MOVEMENTS IN STANDING - Movement Loss indicated by Major, Moderate, Minimal, or Nil: .  Side Bend R: Moderate   Side Bend L: Moderate   FIS: Moderate  Rep FIS: No worse  EIS: Moderate  Rep EIS: Worse    LUMBAR TEST MOVEMENTS IN LYING - Movement Loss indicated by Major, Moderate, Minimal, or Nil: .  HANS: Minimal  Rep HANS: No worse  EIL(Prone lie): Worse    Outcome Measures:  Other Measures  Oswestry Disablity Index (SAMUEL): 38     Treatment Performed: (\"NP\" = Not Performed)     Therapeutic Exercise:  15 minutes  Access Code: [email or web address]  URL: [email or web address]  Date: 11/11/2024  Prepared by: Napoleon Ruff    Exercises  - Seated Lumbar Flexion Stretch  - 1 x daily - 7 x weekly - 10 reps - 10 hold  - Supine Double Knee to Chest  - 1 x daily - 7 x weekly - 10 reps - 10 hold  - Supine Single Knee to Chest Stretch  - 1 x daily - 7 x weekly - 3 reps - 30 hold  - Supine Piriformis Stretch with Foot on Ground  - 1 x daily - 7 x weekly - 3 reps - 30 hold  - Supine Figure 4 Piriformis Stretch  - 1 x daily - 7 x weekly - 3 reps - 30 hold  - Supine Hamstring Stretch with Strap  - 1 x daily - 7 x weekly - 3 reps - 30 hold  - " Supine Transversus Abdominis Bracing - Hands on Stomach  - 1 x daily - 7 x weekly - 2 sets - 10 reps - 5 hold  - Supine Hip Adduction Isometric with Ball  - 1 x daily - 7 x weekly - 2 sets - 10 reps - 5 hold  - Hooklying Clamshell with Resistance  - 1 x daily - 7 x weekly - 2 sets - 10 reps - 5 hold    Manual Therapy:    minutes      Neuromuscular Re-education:  minutes      Gait Training:    minutes      Therapeutic Activity:  minutes      Modalities:  Vasopneumatic Device  minutes  Electrical Stimulation  minutes  Ultrasound  minutes  Iontophoresis  minutes  Cold Pack  minutes    Evaluation Complexity: Low: 25 minutes; Moderate   minutes; Complex PT Evaluation Time Entry: 25;  minutes    Re-Evaluation:   minutes

## 2024-11-14 ENCOUNTER — APPOINTMENT (OUTPATIENT)
Facility: CLINIC | Age: 59
End: 2024-11-14
Payer: COMMERCIAL

## 2024-11-20 DIAGNOSIS — M54.12 CERVICAL RADICULOPATHY: Primary | ICD-10-CM

## 2024-11-20 RX ORDER — CELECOXIB 400 MG/1
400 CAPSULE ORAL ONCE
OUTPATIENT
Start: 2024-11-20 | End: 2024-11-20

## 2024-11-20 RX ORDER — TRANEXAMIC ACID 650 MG/1
1300 TABLET ORAL ONCE
OUTPATIENT
Start: 2024-11-20 | End: 2024-11-20

## 2024-11-20 RX ORDER — ACETAMINOPHEN 325 MG/1
975 TABLET ORAL ONCE
OUTPATIENT
Start: 2024-11-20 | End: 2024-11-20

## 2024-12-08 ASSESSMENT — ENCOUNTER SYMPTOMS
FOCAL SENSORY LOSS: 1
NAUSEA: 0
ALTERED MENTAL STATUS: 0
VOMITING: 0
DIAPHORESIS: 0
WEAKNESS: 0
CLUMSINESS: 0
VISUAL CHANGE: 0
AURA: 0
BOWEL INCONTINENCE: 0
LIGHT-HEADEDNESS: 0
ABDOMINAL PAIN: 0
VERTIGO: 0
NECK PAIN: 1
SLURRED SPEECH: 0
CONFUSION: 0
NEUROLOGIC COMPLAINT: 1
LOSS OF BALANCE: 0
FEVER: 0
DIZZINESS: 0
HEADACHES: 0
FATIGUE: 0
MEMORY LOSS: 0
SHORTNESS OF BREATH: 0
PALPITATIONS: 0
BACK PAIN: 1
FOCAL WEAKNESS: 0
NEAR-SYNCOPE: 0

## 2024-12-09 ENCOUNTER — APPOINTMENT (OUTPATIENT)
Dept: PRIMARY CARE | Facility: CLINIC | Age: 59
End: 2024-12-09
Payer: COMMERCIAL

## 2024-12-09 ENCOUNTER — LAB (OUTPATIENT)
Dept: LAB | Facility: LAB | Age: 59
End: 2024-12-09
Payer: COMMERCIAL

## 2024-12-09 VITALS
HEIGHT: 66 IN | BODY MASS INDEX: 32.11 KG/M2 | WEIGHT: 199.8 LBS | TEMPERATURE: 97.7 F | OXYGEN SATURATION: 98 % | HEART RATE: 65 BPM | SYSTOLIC BLOOD PRESSURE: 130 MMHG | DIASTOLIC BLOOD PRESSURE: 80 MMHG

## 2024-12-09 DIAGNOSIS — M48.02 SPINAL STENOSIS OF CERVICAL REGION: ICD-10-CM

## 2024-12-09 DIAGNOSIS — F98.8 ADD (ATTENTION DEFICIT DISORDER) WITHOUT HYPERACTIVITY: ICD-10-CM

## 2024-12-09 DIAGNOSIS — F98.8 ADD (ATTENTION DEFICIT DISORDER) WITHOUT HYPERACTIVITY: Primary | ICD-10-CM

## 2024-12-09 PROBLEM — J01.90 ACUTE SINUSITIS: Status: ACTIVE | Noted: 2024-12-09

## 2024-12-09 LAB
AMPHETAMINES UR QL SCN: NORMAL
BARBITURATES UR QL SCN: NORMAL
BENZODIAZ UR QL SCN: NORMAL
BZE UR QL SCN: NORMAL
CANNABINOIDS UR QL SCN: NORMAL
FENTANYL+NORFENTANYL UR QL SCN: NORMAL
METHADONE UR QL SCN: NORMAL
OPIATES UR QL SCN: NORMAL
OXYCODONE+OXYMORPHONE UR QL SCN: NORMAL
PCP UR QL SCN: NORMAL

## 2024-12-09 PROCEDURE — 3008F BODY MASS INDEX DOCD: CPT | Performed by: FAMILY MEDICINE

## 2024-12-09 PROCEDURE — 1036F TOBACCO NON-USER: CPT | Performed by: FAMILY MEDICINE

## 2024-12-09 PROCEDURE — G2211 COMPLEX E/M VISIT ADD ON: HCPCS | Performed by: FAMILY MEDICINE

## 2024-12-09 PROCEDURE — 99213 OFFICE O/P EST LOW 20 MIN: CPT | Performed by: FAMILY MEDICINE

## 2024-12-09 PROCEDURE — 80307 DRUG TEST PRSMV CHEM ANLYZR: CPT

## 2024-12-09 RX ORDER — LISDEXAMFETAMINE DIMESYLATE 20 MG/1
20 CAPSULE ORAL EVERY MORNING
Qty: 30 CAPSULE | Refills: 0 | Status: SHIPPED | OUTPATIENT
Start: 2024-12-09 | End: 2025-01-08

## 2024-12-09 RX ORDER — LISDEXAMFETAMINE DIMESYLATE 20 MG/1
20 CAPSULE ORAL EVERY MORNING
Qty: 30 CAPSULE | Refills: 0 | Status: SHIPPED | OUTPATIENT
Start: 2025-01-08 | End: 2025-02-07

## 2024-12-09 RX ORDER — LISDEXAMFETAMINE DIMESYLATE 20 MG/1
20 CAPSULE ORAL EVERY MORNING
Qty: 30 CAPSULE | Refills: 0 | Status: SHIPPED | OUTPATIENT
Start: 2025-02-07 | End: 2025-03-09

## 2024-12-09 ASSESSMENT — PAIN SCALES - GENERAL: PAINLEVEL_OUTOF10: 5

## 2024-12-09 ASSESSMENT — ENCOUNTER SYMPTOMS
DIAPHORESIS: 0
MEMORY LOSS: 0
VISUAL CHANGE: 0
CLUMSINESS: 0
FOCAL WEAKNESS: 0
LOSS OF BALANCE: 0
DIZZINESS: 0
SLURRED SPEECH: 0
PALPITATIONS: 0
FATIGUE: 0
WEAKNESS: 0
NECK PAIN: 1
NEUROLOGIC COMPLAINT: 1
OCCASIONAL FEELINGS OF UNSTEADINESS: 0
LOSS OF SENSATION IN FEET: 0
ABDOMINAL PAIN: 0
BOWEL INCONTINENCE: 0
NAUSEA: 0
LIGHT-HEADEDNESS: 0
AURA: 0
DEPRESSION: 0
VOMITING: 0
ALTERED MENTAL STATUS: 0
BACK PAIN: 1
NEAR-SYNCOPE: 0
FOCAL SENSORY LOSS: 1
VERTIGO: 0
HEADACHES: 0
CONFUSION: 0
SHORTNESS OF BREATH: 0
FEVER: 0

## 2024-12-09 NOTE — PROGRESS NOTES
"Subjective   Patient ID: Sivakumar Hall \"Lizette" is a 59 y.o. male who presents for Follow-up (Med refills).   3 month med refills  Acute Neurological Problem  The patient's primary symptoms include focal sensory loss. The patient's pertinent negatives include no altered mental status, clumsiness, focal weakness, loss of balance, memory loss, near-syncope, slurred speech, syncope, visual change or weakness. This is a recurrent problem. The current episode started more than 1 month ago. The neurological problem developed insidiously. The problem is unchanged. Associated symptoms include back pain and neck pain. Pertinent negatives include no abdominal pain, auditory change, aura, bladder incontinence, bowel incontinence, chest pain, confusion, diaphoresis, dizziness, fatigue, fever, headaches, light-headedness, nausea, palpitations, shortness of breath, vertigo or vomiting. Past treatments include acetaminophen and neck support. The treatment provided no relief.        Review of Systems   Constitutional:  Negative for diaphoresis, fatigue and fever.   Respiratory:  Negative for shortness of breath.    Cardiovascular:  Negative for chest pain, palpitations and near-syncope.   Gastrointestinal:  Negative for abdominal pain, bowel incontinence, nausea and vomiting.   Genitourinary:  Negative for bladder incontinence.   Musculoskeletal:  Positive for back pain and neck pain.   Neurological:  Negative for dizziness, vertigo, focal weakness, syncope, weakness, light-headedness, headaches and loss of balance.   Psychiatric/Behavioral:  Negative for confusion and memory loss.         ADD       Objective   /80   Pulse 65   Temp 36.5 °C (97.7 °F) (Temporal)   Ht 1.676 m (5' 6\")   Wt 90.6 kg (199 lb 12.8 oz)   SpO2 98%   BMI 32.25 kg/m²     Physical Exam  Vitals and nursing note reviewed.   Constitutional:       Appearance: Normal appearance.   Musculoskeletal:         General: Normal range of motion.   Neurological: "      General: No focal deficit present.      Mental Status: He is alert and oriented to person, place, and time.   Psychiatric:         Mood and Affect: Mood normal.         Behavior: Behavior normal.         Assessment/Plan patient seen here for follow-up on his ADD.  He is having difficulty getting the Adderall we are going to switch him to Vyvanse he will let me know how he does with this medication  Problem List Items Addressed This Visit             ICD-10-CM    ADD (attention deficit disorder) without hyperactivity - Primary F98.8    Relevant Medications    lisdexamfetamine (Vyvanse) 20 mg capsule    lisdexamfetamine (Vyvanse) 20 mg capsule (Start on 1/8/2025)    lisdexamfetamine (Vyvanse) 20 mg capsule (Start on 2/7/2025)    Other Relevant Orders    Drug Screen, Urine With Reflex to Confirmation    Spinal stenosis of cervical region M48.02        OARRS:  No data recorded  I have personally reviewed the OARRS report for Sivakumar Hall. I have considered the risks of abuse, dependence, addiction and diversion    Is the patient prescribed a combination of a benzodiazepine and opioid?  No    Last Urine Drug Screen / ordered today: Yes  Recent Results (from the past 8760 hours)   Amphetamine Confirm, Urine    Collection Time: 05/17/24 10:01 AM   Result Value Ref Range    Methamphetamine Quant, Ur <200 ng/mL    MDA, Urine <200 ng/mL    MDEA, Urine <200 ng/mL    Phentermine,Urine <200 ng/mL    Amphetamines,Urine 3271 ng/mL    MDMA, Urine <200 ng/mL   Drug Screen, Urine With Reflex to Confirmation    Collection Time: 05/17/24 10:01 AM   Result Value Ref Range    Amphetamine Screen, Urine Presumptive Positive (A) Presumptive Negative    Barbiturate Screen, Urine Presumptive Negative Presumptive Negative    Benzodiazepines Screen, Urine Presumptive Negative Presumptive Negative    Cannabinoid Screen, Urine Presumptive Negative Presumptive Negative    Cocaine Metabolite Screen, Urine Presumptive Negative Presumptive  Negative    Fentanyl Screen, Urine Presumptive Negative Presumptive Negative    Opiate Screen, Urine Presumptive Negative Presumptive Negative    Oxycodone Screen, Urine Presumptive Negative Presumptive Negative    PCP Screen, Urine Presumptive Negative Presumptive Negative    Methadone Screen, Urine Presumptive Negative Presumptive Negative     N/A    Controlled Substance Agreement:  Date of the Last Agreement: 12/9/24  Reviewed Controlled Substance Agreement including but not limited to the benefits, risks, and alternatives to treatment with a Controlled Substance medication(s).    Stimulants:   What is the patient's goal of therapy? ADD  Is this being achieved with current treatment? yes    Activities of Daily Living:   Is your overall impression that this patient is benefiting (symptom reduction outweighs side effects) from stimulant therapy? Yes     1. Physical Functioning: Better  2. Family Relationship: Better  3. Social Relationship: Better  4. Mood: Better  5. Sleep Patterns: Better  6. Overall Function: Better

## 2024-12-12 ENCOUNTER — APPOINTMENT (OUTPATIENT)
Facility: CLINIC | Age: 59
End: 2024-12-12
Payer: COMMERCIAL

## 2024-12-31 ENCOUNTER — APPOINTMENT (OUTPATIENT)
Dept: PHYSICAL THERAPY | Facility: CLINIC | Age: 59
End: 2024-12-31
Payer: COMMERCIAL

## 2025-01-02 ENCOUNTER — TREATMENT (OUTPATIENT)
Dept: PHYSICAL THERAPY | Facility: CLINIC | Age: 60
End: 2025-01-02
Payer: COMMERCIAL

## 2025-01-02 DIAGNOSIS — M51.26 HERNIATED LUMBAR INTERVERTEBRAL DISC: ICD-10-CM

## 2025-01-02 PROCEDURE — 97110 THERAPEUTIC EXERCISES: CPT | Mod: GP | Performed by: PHYSICAL THERAPIST

## 2025-01-02 NOTE — PROGRESS NOTES
"PHYSICAL THERAPY TREATMENT NOTE    Patient Name:  Sivakumar Hall \"Deshaun\"   Patient MRN: [id number]  Date: 1/2/2025  Time Calculation  Start Time: 1200  Stop Time: 1240  Time Calculation (min): 40 min    Problem List Items Addressed This Visit             ICD-10-CM    Herniated lumbar intervertebral disc M51.26        Insurance:  Visit number #2 of 7  Insurance Type: Payor: ANTHEM / Plan: ANTHEM HMP / Product Type: *No Product type* /   Authorization or Plan of Care date Range: exp 1/10/25    General:  Reason for visit: low back pain   Referred by: MD Candace Strickland MD appt:  having C5/6 fusion 2/20/25    Precautions:  none  Fall Risk: Low    Subjective:  Sivakumar reports he feels like his condition is neither improving nor worsening.  Pain continues center of low back. Does not radiate.     Pain (0-10): 5     HEP adherence / understanding: compliance with the instructed home exercises.    Assessment:  Patient had no increase in symptoms with flexion based stretching as well as core strengthening.     Education: Reviewed home exercise program. Provided verbal feedback to improve exercise technique.    Progress towards functional goals: Patient reports there has not been a significant change in functional abilities.    Response to interventions: Patient tolerated therapeutic interventions well and without any adverse events. Improved independence with home exercises.    Justification for continued skilled care: To address remaining functional, objective and subjective deficits to allow the patient to return to full independence with ADLs. Progressive strengthening to stabilize the spine to improve function.    Plan:  Exercises targeting surrounding musculature to stabilize spine and improve function. Manual therapy to improve joint mobility. Exercises to gradually increase flexibility and range of motion of the spine.    Objective:  No increased pain with repeated flexion    Treatment Performed: (\"NP\" = Not " "Performed) (\"NV\" = Next Visit)    HEP: Access Code: [email or web address]     Therapeutic Exercise: 40 minutes    Nustep 6 min   Seated lumbar flexion with SB 10s x 10  DKTC and LTR with SB 10s x 10 ea  Hooklying hip add/abd iso 5s x 20  Hooklying marching x20  Performed manual LAD and hamstring/piriformis stretching in supine    Manual Therapy:   minutes      Neuromuscular Re-education:  minutes      Therapeutic Activity:  minutes      Gait Training:   minutes      Modalities:  Vasopneumatic Device  minutes  Electrical Stimulation  minutes  Ultrasound  minutes  Iontophoresis  minutes  Cold Pack  minutes    Evaluation Complexity: Low:   minutes; Moderate   minutes; Complex   minutes    Re-Evaluation:   minutes  "

## 2025-01-06 ENCOUNTER — TREATMENT (OUTPATIENT)
Dept: PHYSICAL THERAPY | Facility: CLINIC | Age: 60
End: 2025-01-06
Payer: COMMERCIAL

## 2025-01-06 DIAGNOSIS — M51.26 HERNIATED LUMBAR INTERVERTEBRAL DISC: ICD-10-CM

## 2025-01-06 PROCEDURE — 97110 THERAPEUTIC EXERCISES: CPT | Mod: GP | Performed by: PHYSICAL THERAPIST

## 2025-01-06 NOTE — PROGRESS NOTES
"PHYSICAL THERAPY TREATMENT NOTE    Patient Name:  Sivakumar Hall \"Deshaun\"   Patient MRN: [id number]  Date: 1/6/2025  Time Calculation  Start Time: 0945  Stop Time: 1030  Time Calculation (min): 45 min    Problem List Items Addressed This Visit             ICD-10-CM    Herniated lumbar intervertebral disc M51.26     Insurance:  Visit number #3 of 7  Insurance Type: Payor: ANTHEM / Plan: ANTHEM HMP / Product Type: *No Product type* /   Authorization or Plan of Care date Range: exp 1/10/25    General:  Reason for visit: low back pain   Referred by: MD Candace Strickland MD appt:  having C5/6 fusion 2/20/25    Precautions:  none  Fall Risk: Low    Subjective:  Sivakumar reports he feels like his condition is neither improving nor worsening. No worse from normal.     Pain (0-10): 5     HEP adherence / understanding: compliance with the instructed home exercises.    Assessment:    Education: Reviewed home exercise program. Provided verbal feedback to improve exercise technique.    Progress towards functional goals: Patient reports there has not been a significant change in functional abilities.    Response to interventions: Patient tolerated therapeutic interventions well and without any adverse events. Improved independence with home exercises.    Justification for continued skilled care: To address remaining functional, objective and subjective deficits to allow the patient to return to full independence with ADLs. Progressive strengthening to stabilize the spine to improve function.    Plan:  Exercises targeting surrounding musculature to stabilize spine and improve function. Manual therapy to improve joint mobility. Exercises to gradually increase flexibility and range of motion of the spine.    Objective:  No increased pain with repeated flexion    Treatment Performed: (\"NP\" = Not Performed) (\"NV\" = Next Visit)    HEP: Access Code: [email or web address]     Therapeutic Exercise: 45 minutes    Nustep 6 min   Seated lumbar flexion " with SB 10s x 10  DKTC and LTR with SB 10s x 10 ea  Hooklying hip add/abd iso 5s x 20  Hooklying marching x20 G  TB row/ext G x20 ea  TB anti-rotation G x20 ea  Standing hip abd/ext/flex red loop x10ea bilateral  Performed manual LAD and hamstring/piriformis stretching in supine    Manual Therapy:   minutes      Neuromuscular Re-education:  minutes      Therapeutic Activity:  minutes      Gait Training:   minutes      Modalities:  Vasopneumatic Device  minutes  Electrical Stimulation  minutes  Ultrasound  minutes  Iontophoresis  minutes  Cold Pack  minutes    Evaluation Complexity: Low:   minutes; Moderate   minutes; Complex   minutes    Re-Evaluation:   minutes

## 2025-01-08 ENCOUNTER — TREATMENT (OUTPATIENT)
Dept: PHYSICAL THERAPY | Facility: CLINIC | Age: 60
End: 2025-01-08
Payer: COMMERCIAL

## 2025-01-08 DIAGNOSIS — M51.26 HERNIATED LUMBAR INTERVERTEBRAL DISC: ICD-10-CM

## 2025-01-08 PROCEDURE — 97110 THERAPEUTIC EXERCISES: CPT | Mod: GP | Performed by: PHYSICAL THERAPIST

## 2025-01-08 NOTE — PROGRESS NOTES
"PHYSICAL THERAPY TREATMENT NOTE    Patient Name:  Sivakumar Hall \"Deshaun\"   Patient MRN: [id number]  Date: 1/8/2025  Time Calculation  Start Time: 0940  Stop Time: 1020  Time Calculation (min): 40 min    Problem List Items Addressed This Visit             ICD-10-CM    Herniated lumbar intervertebral disc M51.26       Insurance:  Visit number #4 of 7  Insurance Type: Payor: ANTHEM / Plan: ANTHEM HMP / Product Type: *No Product type* /   Authorization or Plan of Care date Range: exp 1/10/25    General:  Reason for visit: low back pain   Referred by: MD Candace Strickland MD appt:  having C5/6 fusion 2/20/25    Precautions:  none  Fall Risk: Low    Subjective:  Sivakumar reports he feels like his condition is neither improving nor worsening.  Doing ok with current HEP    Pain (0-10): 6     HEP adherence / understanding: compliance with the instructed home exercises.    Assessment:  Patient will continue with HEP at this time for stability.     Education: Reviewed home exercise program. Provided verbal feedback to improve exercise technique.    Progress towards functional goals: Patient reports there has not been a significant change in functional abilities.    Response to interventions: Patient tolerated therapeutic interventions well and without any adverse events. Improved independence with home exercises. Improved tolerance to strengthening progressions.    Justification for continued skilled care: To address remaining functional, objective and subjective deficits to allow the patient to return to full independence with ADLs. Progressive strengthening to stabilize the spine to improve function.    Plan:  Discharge from physical therapy.      Objective:  Goals:  -Patient to be Indep with HEP for self management of symptoms-Met-on-going     -Abolish LE radiating/radicular symptoms-met, minimal at this time     -Modified Oswestry: 0-19% impairment to indicate a significant improvement in overall function.-not met 34%   " "  -Patient will demonstrate correct posture with min to no cueing to allow for correct loading strategy.-met, improving     -Patient will demo mild to no limitation AROM of the lumbar spine to allow for correct mechanics with functional mobility. -not met, improved somewhat     -Patient will report standing 60 minutes  without pain to allow for return to work and ADLs without limitation. -not met, 30 min     -Patient will report sitting 60 minutes without pain to allow for return to work and ADLs without limitation.-not met, 45 min     -Patient will demonstrate appropriate body mechanics for household and common activities to reduce incidence or future back pain exacerbations -met, improved       Treatment Performed: (\"NP\" = Not Performed) (\"NV\" = Next Visit)    HEP: Access Code: [email or web address]     Therapeutic Exercise: 40 minutes    Nustep 6 min   Seated lumbar flexion with SB 10s x 10  DKTC and LTR with SB 10s x 10 ea  Hooklying hip add/abd iso 5s x 20  Hooklying marching x20 G  Clam G TB x20 bilateral  TB row/ext G x20 ea  TB anti-rotation G x20 ea  Standing hip abd/ext/flex red loop x10ea bilateral  Performed manual LAD and hamstring/piriformis stretching in supine    Manual Therapy:   minutes      Neuromuscular Re-education:  minutes      Therapeutic Activity:  minutes      Gait Training:   minutes      Modalities:  Vasopneumatic Device  minutes  Electrical Stimulation  minutes  Ultrasound  minutes  Iontophoresis  minutes  Cold Pack  minutes    Evaluation Complexity: Low:   minutes; Moderate   minutes; Complex   minutes    Re-Evaluation:   minutes  "

## 2025-01-17 ENCOUNTER — TELEPHONE (OUTPATIENT)
Dept: NEUROSURGERY | Facility: CLINIC | Age: 60
End: 2025-01-17
Payer: COMMERCIAL

## 2025-01-20 DIAGNOSIS — M51.26 HERNIATED LUMBAR INTERVERTEBRAL DISC: Primary | ICD-10-CM

## 2025-01-20 DIAGNOSIS — M54.12 CERVICAL RADICULOPATHY: ICD-10-CM

## 2025-01-21 DIAGNOSIS — E78.5 HYPERLIPIDEMIA, UNSPECIFIED HYPERLIPIDEMIA TYPE: ICD-10-CM

## 2025-01-21 DIAGNOSIS — F98.8 ADD (ATTENTION DEFICIT DISORDER) WITHOUT HYPERACTIVITY: ICD-10-CM

## 2025-01-21 RX ORDER — ROSUVASTATIN CALCIUM 10 MG/1
10 TABLET, COATED ORAL DAILY
Qty: 90 TABLET | Refills: 0 | Status: SHIPPED | OUTPATIENT
Start: 2025-01-21

## 2025-01-21 RX ORDER — LISDEXAMFETAMINE DIMESYLATE 20 MG/1
20 CAPSULE ORAL EVERY MORNING
Qty: 30 CAPSULE | Refills: 0 | Status: SHIPPED | OUTPATIENT
Start: 2025-01-21 | End: 2025-02-20

## 2025-01-22 ENCOUNTER — TELEPHONE (OUTPATIENT)
Dept: PRIMARY CARE | Facility: CLINIC | Age: 60
End: 2025-01-22
Payer: COMMERCIAL

## 2025-01-22 DIAGNOSIS — F98.8 ADD (ATTENTION DEFICIT DISORDER) WITHOUT HYPERACTIVITY: ICD-10-CM

## 2025-01-22 RX ORDER — LISDEXAMFETAMINE DIMESYLATE 30 MG/1
30 CAPSULE ORAL EVERY MORNING
Qty: 30 CAPSULE | Refills: 0 | Status: SHIPPED | OUTPATIENT
Start: 2025-01-22 | End: 2025-02-21

## 2025-01-31 ENCOUNTER — APPOINTMENT (OUTPATIENT)
Dept: RADIOLOGY | Facility: CLINIC | Age: 60
End: 2025-01-31
Payer: COMMERCIAL

## 2025-02-06 ENCOUNTER — APPOINTMENT (OUTPATIENT)
Dept: PREADMISSION TESTING | Facility: HOSPITAL | Age: 60
End: 2025-02-06
Payer: COMMERCIAL

## 2025-02-13 ENCOUNTER — HOSPITAL ENCOUNTER (OUTPATIENT)
Dept: RADIOLOGY | Facility: CLINIC | Age: 60
Discharge: HOME | End: 2025-02-13
Payer: COMMERCIAL

## 2025-02-13 DIAGNOSIS — M51.26 HERNIATED LUMBAR INTERVERTEBRAL DISC: ICD-10-CM

## 2025-02-13 PROCEDURE — 72148 MRI LUMBAR SPINE W/O DYE: CPT

## 2025-02-17 ENCOUNTER — TELEPHONE (OUTPATIENT)
Facility: CLINIC | Age: 60
End: 2025-02-17
Payer: COMMERCIAL

## 2025-02-17 NOTE — TELEPHONE ENCOUNTER
----- Message from Moshe Donahue sent at 2/17/2025  8:22 AM EST -----  Regarding: Results Review  MRI L-Spine slightly worsened but still focus on neck for now, will revisit this after neck  ----- Message -----  From: Interface, Radiology Results In  Sent: 2/14/2025   6:37 PM EST  To: Moshe Donahue MD

## 2025-02-19 ASSESSMENT — DUKE ACTIVITY SCORE INDEX (DASI)
TOTAL_SCORE: 28.7
CAN YOU WALK A BLOCK OR TWO ON LEVEL GROUND: YES
DASI METS SCORE: 6.3
CAN YOU DO YARD WORK LIKE RAKING LEAVES, WEEDING OR PUSHING A MOWER: YES
CAN YOU PARTICIPATE IN STRENOUS SPORTS LIKE SWIMMING, SINGLES TENNIS, FOOTBALL, BASKETBALL, OR SKIING: NO
CAN YOU RUN A SHORT DISTANCE: NO
CAN YOU PARTICIPATE IN MODERATE RECREATIONAL ACTIVITIES LIKE GOLF, BOWLING, DANCING, DOUBLES TENNIS OR THROWING A BASEBALL OR FOOTBALL: NO
CAN YOU DO MODERATE WORK AROUND THE HOUSE LIKE VACUUMING, SWEEPING FLOORS OR CARRYING GROCERIES: YES
CAN YOU CLIMB A FLIGHT OF STAIRS OR WALK UP A HILL: YES
CAN YOU TAKE CARE OF YOURSELF (EAT, DRESS, BATHE, OR USE TOILET): YES
CAN YOU HAVE SEXUAL RELATIONS: YES
CAN YOU DO HEAVY WORK AROUND THE HOUSE LIKE SCRUBBING FLOORS OR LIFTING AND MOVING HEAVY FURNITURE: NO
CAN YOU WALK INDOORS, SUCH AS AROUND YOUR HOUSE: YES
CAN YOU DO LIGHT WORK AROUND THE HOUSE LIKE DUSTING OR WASHING DISHES: YES

## 2025-02-19 ASSESSMENT — ACTIVITIES OF DAILY LIVING (ADL): ADL_SCORE: 0

## 2025-02-19 ASSESSMENT — LIFESTYLE VARIABLES: SMOKING_STATUS: NONSMOKER

## 2025-02-20 ENCOUNTER — LAB (OUTPATIENT)
Dept: LAB | Facility: HOSPITAL | Age: 60
End: 2025-02-20
Payer: COMMERCIAL

## 2025-02-20 ENCOUNTER — HOSPITAL ENCOUNTER (OUTPATIENT)
Dept: RADIOLOGY | Facility: HOSPITAL | Age: 60
Discharge: HOME | End: 2025-02-20
Payer: COMMERCIAL

## 2025-02-20 ENCOUNTER — PRE-ADMISSION TESTING (OUTPATIENT)
Dept: PREADMISSION TESTING | Facility: HOSPITAL | Age: 60
End: 2025-02-20
Payer: COMMERCIAL

## 2025-02-20 VITALS
RESPIRATION RATE: 16 BRPM | WEIGHT: 197.97 LBS | DIASTOLIC BLOOD PRESSURE: 67 MMHG | HEART RATE: 58 BPM | BODY MASS INDEX: 31.82 KG/M2 | TEMPERATURE: 97 F | OXYGEN SATURATION: 95 % | SYSTOLIC BLOOD PRESSURE: 145 MMHG | HEIGHT: 66 IN

## 2025-02-20 DIAGNOSIS — Z01.818 PRE-OP TESTING: ICD-10-CM

## 2025-02-20 DIAGNOSIS — M54.12 CERVICAL RADICULOPATHY: ICD-10-CM

## 2025-02-20 DIAGNOSIS — Z01.818 PREOP EXAMINATION: Primary | ICD-10-CM

## 2025-02-20 LAB
ANION GAP SERPL CALC-SCNC: 9 MMOL/L (ref 10–20)
APTT PPP: 31 SECONDS (ref 27–38)
BASOPHILS # BLD AUTO: 0.04 X10*3/UL (ref 0–0.1)
BASOPHILS NFR BLD AUTO: 0.7 %
BUN SERPL-MCNC: 12 MG/DL (ref 6–23)
CALCIUM SERPL-MCNC: 9.4 MG/DL (ref 8.6–10.3)
CHLORIDE SERPL-SCNC: 106 MMOL/L (ref 98–107)
CO2 SERPL-SCNC: 28 MMOL/L (ref 21–32)
CREAT SERPL-MCNC: 0.92 MG/DL (ref 0.5–1.3)
EGFRCR SERPLBLD CKD-EPI 2021: >90 ML/MIN/1.73M*2
EOSINOPHIL # BLD AUTO: 0.12 X10*3/UL (ref 0–0.7)
EOSINOPHIL NFR BLD AUTO: 2.2 %
ERYTHROCYTE [DISTWIDTH] IN BLOOD BY AUTOMATED COUNT: 11.9 % (ref 11.5–14.5)
GLUCOSE SERPL-MCNC: 98 MG/DL (ref 74–99)
HCT VFR BLD AUTO: 44.3 % (ref 41–52)
HGB BLD-MCNC: 14.6 G/DL (ref 13.5–17.5)
IMM GRANULOCYTES # BLD AUTO: 0.01 X10*3/UL (ref 0–0.7)
IMM GRANULOCYTES NFR BLD AUTO: 0.2 % (ref 0–0.9)
INR PPP: 0.9 (ref 0.9–1.1)
LYMPHOCYTES # BLD AUTO: 1.86 X10*3/UL (ref 1.2–4.8)
LYMPHOCYTES NFR BLD AUTO: 34.1 %
MCH RBC QN AUTO: 31.3 PG (ref 26–34)
MCHC RBC AUTO-ENTMCNC: 33 G/DL (ref 32–36)
MCV RBC AUTO: 95 FL (ref 80–100)
MONOCYTES # BLD AUTO: 0.43 X10*3/UL (ref 0.1–1)
MONOCYTES NFR BLD AUTO: 7.9 %
NEUTROPHILS # BLD AUTO: 3 X10*3/UL (ref 1.2–7.7)
NEUTROPHILS NFR BLD AUTO: 54.9 %
NRBC BLD-RTO: 0 /100 WBCS (ref 0–0)
PLATELET # BLD AUTO: 206 X10*3/UL (ref 150–450)
POTASSIUM SERPL-SCNC: 4.4 MMOL/L (ref 3.5–5.3)
PROTHROMBIN TIME: 10.6 SECONDS (ref 9.8–12.8)
RBC # BLD AUTO: 4.67 X10*6/UL (ref 4.5–5.9)
SODIUM SERPL-SCNC: 139 MMOL/L (ref 136–145)
WBC # BLD AUTO: 5.5 X10*3/UL (ref 4.4–11.3)

## 2025-02-20 PROCEDURE — 84134 ASSAY OF PREALBUMIN: CPT

## 2025-02-20 PROCEDURE — 85610 PROTHROMBIN TIME: CPT

## 2025-02-20 PROCEDURE — 71046 X-RAY EXAM CHEST 2 VIEWS: CPT

## 2025-02-20 PROCEDURE — 86850 RBC ANTIBODY SCREEN: CPT

## 2025-02-20 PROCEDURE — 85025 COMPLETE CBC W/AUTO DIFF WBC: CPT

## 2025-02-20 PROCEDURE — 86900 BLOOD TYPING SEROLOGIC ABO: CPT

## 2025-02-20 PROCEDURE — 99202 OFFICE O/P NEW SF 15 MIN: CPT | Performed by: NURSE PRACTITIONER

## 2025-02-20 PROCEDURE — 87081 CULTURE SCREEN ONLY: CPT | Mod: STJLAB

## 2025-02-20 PROCEDURE — 86901 BLOOD TYPING SEROLOGIC RH(D): CPT

## 2025-02-20 PROCEDURE — 80048 BASIC METABOLIC PNL TOTAL CA: CPT

## 2025-02-20 PROCEDURE — 83036 HEMOGLOBIN GLYCOSYLATED A1C: CPT

## 2025-02-20 PROCEDURE — 85730 THROMBOPLASTIN TIME PARTIAL: CPT

## 2025-02-20 PROCEDURE — 93005 ELECTROCARDIOGRAM TRACING: CPT

## 2025-02-20 RX ORDER — GLUCOSAM/CHON-MSM1/C/MANG/BOSW 750-644 MG
1 TABLET ORAL DAILY
COMMUNITY

## 2025-02-20 RX ORDER — PERPHENAZINE 16 MG
1 TABLET ORAL DAILY
COMMUNITY

## 2025-02-20 RX ORDER — ACETAMINOPHEN 500 MG
5000 TABLET ORAL DAILY
COMMUNITY

## 2025-02-20 RX ORDER — MULTIVIT-MIN/IRON FUM/FOLIC AC 7.5 MG-4
1 TABLET ORAL DAILY
COMMUNITY

## 2025-02-20 RX ORDER — CHLORHEXIDINE GLUCONATE ORAL RINSE 1.2 MG/ML
SOLUTION DENTAL
Qty: 473 ML | Refills: 0 | Status: SHIPPED | OUTPATIENT
Start: 2025-02-20

## 2025-02-20 RX ORDER — IBUPROFEN 100 MG/5ML
1000 SUSPENSION, ORAL (FINAL DOSE FORM) ORAL DAILY
COMMUNITY

## 2025-02-20 ASSESSMENT — PAIN - FUNCTIONAL ASSESSMENT: PAIN_FUNCTIONAL_ASSESSMENT: 0-10

## 2025-02-20 ASSESSMENT — PAIN SCALES - GENERAL: PAINLEVEL_OUTOF10: 0 - NO PAIN

## 2025-02-20 NOTE — CPM/PAT H&P
"CPM/PAT Evaluation       Name: Sivakumar Hall (Sivakumar Hall \"Deshaun\")  /Age: 1965/59 y.o.     In-Person       Chief Complaint: pre op appointment for upcoming spine surgery    HPI    AFUA is a 58 yo male who has history of cervical and lumbar spine disease with previous surgical interventions to both spinal regions.  Cervical spine surgery was back in  and lumbar spine surgery was back in .  In the last 2 years patient has been experiencing upper neck discomforts causing limited range of motion and radiating numbness and tingling down left arm.  Symptoms are worsening over time causing sleep disturbances.  Patient underwent a cervical spine ablation about 1 year ago with little relief.  Recent imaging has shown cervical spine radiculopathy.  Treatment options discussed and subsequently he has been scheduled for a cervical spine redo anterior cervical discectomy and fusion with extension of plates. Presents to [facility] today for perioperative risk stratification and optimization. Denies any recent upper neck ROM or headaches. Endorses radiating numbness and tingling down left arm and bilateral hands at times. Denies any recent steroids or interventions in the last few months to the spine. Otherwise denies any recent illness, fever/chills, chest pains or shortness of breath.     Past Medical History:   Diagnosis Date    ADD (attention deficit disorder)     Cervical disc disease 2001    Cervical disc disorder 2022    Cervical radiculopathy     Hyperlipidemia     Joint pain     Low back pain 2004    Lumbosacral disc disease 2004    Neck pain 2004    Spinal stenosis 2010    Vision loss        Past Surgical History:   Procedure Laterality Date    ADENOIDECTOMY      ANTERIOR CERVICAL DISCECTOMY W/ FUSION      BACK SURGERY      CERVICAL FUSION  2003    EYE SURGERY      MICRODISCECTOMY LUMBAR      SPINE SURGERY      TONSILLECTOMY         Patient  has no history on file for sexual " activity.    Family History   Problem Relation Name Age of Onset    Prostate cancer Father Rocco Hall     Cancer Father Rocco Hall        No Known Allergies    Prior to Admission medications    Medication Sig Start Date End Date Taking? Authorizing Provider   chlorhexidine (Peridex) 0.12 % solution Swish and spit 15 ml for 2 doses, 15mL the night before surgery and 15 ml morning of surgery - swish for 30 seconds -DO NOT SWALLOW, SPIT OUT 2/20/25   AmeriCHACORTA Becerril-CNP   DULoxetine (Cymbalta) 30 mg DR capsule Take 1 capsule (30 mg) by mouth once daily. 5/28/24 5/28/25  Solo Andre DO   gabapentin (Neurontin) 300 mg capsule TAKE ONE CAPSULE BY MOUTH EVERY DAY AT BEDTIME 9/17/24   Solo Andre DO   hydrocortisone (Proctozone-HC) 2.5 % rectal cream Apply rectally twice daily as needed 5/20/21   Historical Provider, MD   ketoconazole (NIZOral) 2 % shampoo Wash the scalp daily when flared, or 3 times per week for maintenance. Lather and let sit for 3-5 minutes before rinsing. 2/27/23   Historical Provider, MD   lisdexamfetamine (Vyvanse) 20 mg capsule Take 1 capsule (20 mg) by mouth once daily in the morning. Do not fill before February 7, 2025. 2/7/25 3/9/25  Solo Andre DO   lisdexamfetamine (Vyvanse) 20 mg capsule Take 1 capsule (20 mg) by mouth once daily in the morning. 1/21/25 2/20/25  Solo Andre DO   lisdexamfetamine (Vyvanse) 30 mg capsule Take 1 capsule (30 mg) by mouth once daily in the morning. 1/22/25 2/21/25  Solo Andre DO   rosuvastatin (Crestor) 10 mg tablet Take 1 tablet (10 mg) by mouth once daily. 1/21/25   Solo Andre, DO        PAT ROS   Constitutional: Negative for fever, chills, or sweats   ENMT: Negative for nasal discharge, congestion, ear pain, mouth pain, throat pain + eyeglasses    Respiratory: Negative for cough, wheezing, shortness of breath   Cardiac: Negative for chest pain, dyspnea on exertion,  palpitations   Gastrointestinal: Negative for nausea, vomiting, diarrhea, constipation, abdominal pain  Genitourinary: Negative for dysuria, flank pain, frequency, hematuria     Musculoskeletal: Positive for pain and weakness in neck and lower back along with numbness & tingling down left arm and in hands     Neurological: Negative for dizziness, confusion, headache    Psychiatric: Negative for mood changes: + currently treated for ADD     Skin: Negative for itching, rash, ulcer    Hematologic/Lymph: Negative for bruising, easy bleeding  Allergic/Immunologic: Negative itching, sneezing, swelling      Physical Exam  Constitutional:       Appearance: Normal appearance.   HENT:      Head: Normocephalic.      Mouth/Throat:      Mouth: Mucous membranes are moist.   Eyes:      Extraocular Movements: Extraocular movements intact.   Cardiovascular:      Rate and Rhythm: Normal rate and regular rhythm.   Pulmonary:      Effort: Pulmonary effort is normal.      Breath sounds: Normal breath sounds.   Abdominal:      General: Abdomen is flat.      Palpations: Abdomen is soft.   Musculoskeletal:      Cervical back: Decreased range of motion.      Lumbar back: Decreased range of motion.   Skin:     General: Skin is warm and dry.   Neurological:      General: No focal deficit present.      Mental Status: He is alert.   Psychiatric:         Mood and Affect: Mood normal.          PAT AIRWAY:   Airway:     Mallampati::  II    Neck ROM::  Full  normal        Testing/Diagnostic:     Lab Results   Component Value Date    WBC 6.8 08/15/2024    HGB 15.1 08/15/2024    HCT 44.5 08/15/2024    MCV 95 08/15/2024     08/15/2024     Lab Results   Component Value Date    GLUCOSE 103 (H) 08/15/2024    CALCIUM 9.4 08/15/2024     08/15/2024    K 4.4 08/15/2024    CO2 26 08/15/2024     (H) 08/15/2024    BUN 19 08/15/2024    CREATININE 0.94 08/15/2024       Patient Specialist/PCP: Dr. Chen    Visit Vitals  /67   Pulse 58  "  Temp 36.1 °C (97 °F) (Temporal)   Resp 16   Ht 1.676 m (5' 6\")   Wt 89.8 kg (197 lb 15.6 oz)   SpO2 95%   BMI 31.95 kg/m²   Smoking Status Never   BSA 2.04 m²       DASI Risk Score      Flowsheet Row Pre-Admission Testing from 2/20/2025 in [facility]   Can you take care of yourself (eat, dress, bathe, or use toilet)?  2.75 filed at 02/19/2025 1301   Can you walk indoors, such as around your house? 1.75 filed at 02/19/2025 1301   Can you walk a block or two on level ground?  2.75 filed at 02/19/2025 1301   Can you climb a flight of stairs or walk up a hill? 5.5 filed at 02/19/2025 1301   Can you run a short distance? 0 filed at 02/19/2025 1301   Can you do light work around the house like dusting or washing dishes? 2.7 filed at 02/19/2025 1301   Can you do moderate work around the house like vacuuming, sweeping floors or carrying groceries? 3.5 filed at 02/19/2025 1301   Can you do heavy work around the house like scrubbing floors or lifting and moving heavy furniture?  0 filed at 02/19/2025 1301   Can you do yard work like raking leaves, weeding or pushing a mower? 4.5 filed at 02/19/2025 1301   Can you have sexual relations? 5.25 filed at 02/19/2025 1301   Can you participate in moderate recreational activities like golf, bowling, dancing, doubles tennis or throwing a baseball or football? 0 filed at 02/19/2025 1301   Can you participate in strenous sports like swimming, singles tennis, football, basketball, or skiing? 0 filed at 02/19/2025 1301   DASI SCORE 28.7 filed at 02/19/2025 1301   METS Score (Will be calculated only when all the questions are answered) 6.3 filed at 02/19/2025 1301          Caprini DVT Assessment      Flowsheet Row Pre-Admission Testing from 2/20/2025 in [facility]   DVT Score (IF A SCORE IS NOT CALCULATING, MUST SELECT A BMI TO COMPLETE) 8 filed at 02/19/2025 1303   Surgical Factors Major surgery planned, lasting 2-3 hours filed at 02/19/2025 1303   BMI " (BMI MUST BE CHOSEN) 31-40 (Obesity) filed at 02/19/2025 1303          Modified Frailty Index      Flowsheet Row Pre-Admission Testing from 2/20/2025 in [facility]   Non-independent functional status (problems with dressing, bathing, personal grooming, or cooking) 0 filed at 02/19/2025 1302   History of diabetes mellitus  0 filed at 02/19/2025 1302   History of COPD 0 filed at 02/19/2025 1302   History of CHF No filed at 02/19/2025 1302   History of MI 0 filed at 02/19/2025 1302   History of Percutaneous Coronary Intervention, Cardiac Surgery, or Angina No filed at 02/19/2025 1302   Hypertension requiring the use of medication  0 filed at 02/19/2025 1302   Peripheral vascular disease 0 filed at 02/19/2025 1302   Impaired sensorium (cognitive impairement or loss, clouding, or delirium) 0 filed at 02/19/2025 1302   TIA or CVA withouy residual deficit 0 filed at 02/19/2025 1302   Cerebrovascular accident with deficit 0 filed at 02/19/2025 1302   Modified Frailty Index Calculator 0 filed at 02/19/2025 1302          CHADS2 Stroke Risk  Current as of about an hour ago        N/A 3 to 100%: High Risk   2 to < 3%: Medium Risk   0 to < 2%: Low Risk     Last Change: N/A          This score determines the patient's risk of having a stroke if the patient has atrial fibrillation.        This score is not applicable to this patient. Components are not calculated.          Revised Cardiac Risk Index      Flowsheet Row Pre-Admission Testing from 2/20/2025 in [facility]   High-Risk Surgery (Intraperitoneal, Intrathoracic,Suprainguinal vascular) 0 filed at 02/19/2025 1302   History of ischemic heart disease (History of MI, History of positive exercuse test, Current chest paint considered due to myocardial ischemia, Use of nitrate therapy, ECG with pathological Q Waves) 0 filed at 02/19/2025 1302   History of congestive heart failure (pulmonary edemia, bilateral rales or S3 gallop, Paroxysmal nocturnal  dyspnea, CXR showing pulmonary vascular redistribution) 0 filed at 2025 1302   History of cerebrovascular disease (Prior TIA or stroke) 0 filed at 2025 1302   Pre-operative insulin treatment 0 filed at 2025 1302   Pre-operative creatinine>2 mg/dl 0 filed at 2025 1302   Revised Cardiac Risk Calculator 0 filed at 2025 1302          Apfel Simplified Score      Flowsheet Row Pre-Admission Testing from 2025 in [facility]   Smoking status 1 filed at 2025 1302   History of motion sickness or PONV  0 filed at 2025 1302   Use of postoperative opioids 1 filed at 2025 1302   Gender - Female 0=No filed at 2025 1302   Apfel Simplified Score Calculator 2 filed at 2025 1302          Risk Analysis Index Results This Encounter         2025  1302             Do you live in a place other than your own home?: 0    When did you begin living in the place you are currently residing?: Greater than one year ago    Any kidney failure, kidney not working well, or seeing a kidney doctor (nephrologist)? If yes, was this for kidney stones or another problem?: 0 No    Any history of chronic (long-term) congestive heart failure (CHF)?: 0 No    Any shortness of breath when resting?: 0 No    In the past five years, have you been diagnosed with or treated for cancer?: No    During the last 3 months has it become difficult for you to remember things or organize your thoughts?: 0 No    Have you lost weight of 10 pounds or more in the past 3 months without trying?: 0 No    Do you have any loss of appetitie?: 0 No    Getting Around (Mobility): 0 Can get around without help    Eatin Can plan and prepare own meals    Toiletin Can use toilet without any help    Personal Hygiene (Bathing, Hand Washing, Changing Clothes): 0 Can shower or bathe without any help    MCCOLLUM Cancer History: Patient does not indicate history of cancer    Total Risk Analysis Index Score  Without Cancer: 21    Total Risk Analysis Index Score: 21          Stop Bang Score      Flowsheet Row Pre-Admission Testing from 2/20/2025 in [facility]   Do you snore loudly? 1 filed at 02/19/2025 1301   Do you often feel tired or fatigued after your sleep? 0 filed at 02/19/2025 1301   Has anyone ever observed you stop breathing in your sleep? 0 filed at 02/19/2025 1301   Do you have or are you being treated for high blood pressure? 0 filed at 02/19/2025 1301   Recent BMI (Calculated) 32.3 filed at 02/19/2025 1301   Is BMI greater than 35 kg/m2? 0=No filed at 02/19/2025 1301   Age older than 50 years old? 1=Yes filed at 02/19/2025 1301   Is your neck circumference greater than 17 inches (Male) or 16 inches (Female)? 1 filed at 02/19/2025 1301   Gender - Male 1=Yes filed at 02/19/2025 1301   STOP-BANG Total Score 4 filed at 02/19/2025 1301          Prodigy: High Risk  Total Score: 16              Prodigy Age Score      Prodigy Gender Score          ARISCAT Score for Postoperative Pulmonary Complications      Flowsheet Row Pre-Admission Testing from 2/20/2025 in [facility]   Age Calculated Score 3 filed at 02/19/2025 1304   Preoperative SpO2 0 filed at 02/19/2025 1304   Respiratory infection in the last month Either upper or lower (i.e., URI, bronchitis, pneumonia), with fever and antibiotic treatment 0 filed at 02/19/2025 1304   Preoperative anemia (Hgb less than 10 g/dl) 0 filed at 02/19/2025 1304   Surgical incision  0 filed at 02/19/2025 1304   Duration of surgery  16 filed at 02/19/2025 1304   Emergency Procedure  0 filed at 02/19/2025 1304   ARISCAT Total Score  19 filed at 02/19/2025 1304          Duggan Perioperative Risk for Myocardial Infarction or Cardiac Arrest (ANSHU)      Flowsheet Row Pre-Admission Testing from 2/20/2025 in [facility]   Calculated Age Score 1.2 filed at 02/19/2025 1304   Functional Status  0 filed at 02/19/2025 1304   ASA Class  -5.17 filed  at 2025 1304   Creatinine 0 filed at 2025 1304   Type of Procedure  0.21 filed at 2025 1304   ANSHU Total Score  -9.01 filed at 2025 1304   ANSHU % 0.01 filed at 2025 1304            Assessment and Plan:     Pre-Op  58 yo male scheduled for C5-C6 Redo Anterior Cervical Discectomy and Fusion, extension of plate C5-C7 on 3/6/2025 with Dr. Donahue. Blood work and MRSA ordered- oral chlorahexidine prescribed. EKG shows sinus bradycardia, v rate of 56 bpm. Otherwise no further orders indicated.    Cardiac  Hyperlipidemia, taking statin  DASI Score: 28.7   MET Score: 6.3  RCRI  0 which is 3.9% 30 day risk of MACE (risk for cardiac death, nonfatal myocardial infarction, and nonfactal cardiac arrest)  ANSHU score which indicates a  0.01 % risk of intraoperative or 30-day postoperative MACE    Pulmonary  No diagnosis or significant findings on chart review or clinical presentation and evaluation.    Preoperative deep breathing educational handout provided to patient.  STOP BAN   points which is a intermediate risk for moderate to severe BARBARA  ARISCAT:   19   points which is a low (1.6%) risk of in-hospital post-op pulmonary complications     Neuro  -Cervical radiculopathy, radiating down left arm, taking Cymbalta and Neurontin  -Cervical spinal stenosis with history of surgery in , ablation over one year ago   -Lumbosacral disc disease with history of surgery in     Endocrine  Preoperative hemoglobin A1c ordered    GI  Constipation, taking laxatives as needed  Apfel: 2 points 39% risk for post operative N/V    /Renal  Counseled on avoiding NSAIDs, adequate hydration    Hematologic  No hematological medical history, however due to high Caprini score of  8 patient is at HIGH risk for perioperative DVT, informative education handout provided    Psychiatric  ADD, taking Vyvanse daily for work     Skin   History of basal cell carcinoma, forehead with history of excison  -Patient was  instructed to make surgeon aware of any skin changes/concerns prior to surgery.     Anesthesia:  Patient denies any anesthesia complications.     See risk scores as previously documented.

## 2025-02-20 NOTE — H&P (VIEW-ONLY)
"CPM/PAT Evaluation       Name: Sivakumar Hall (Sivakumar Hall \"Deshaun\")  /Age: 1965/59 y.o.     In-Person       Chief Complaint: pre op appointment for upcoming spine surgery    HPI    AFUA is a 60 yo male who has history of cervical and lumbar spine disease with previous surgical interventions to both spinal regions.  Cervical spine surgery was back in  and lumbar spine surgery was back in .  In the last 2 years patient has been experiencing upper neck discomforts causing limited range of motion and radiating numbness and tingling down left arm.  Symptoms are worsening over time causing sleep disturbances.  Patient underwent a cervical spine ablation about 1 year ago with little relief.  Recent imaging has shown cervical spine radiculopathy.  Treatment options discussed and subsequently he has been scheduled for a cervical spine redo anterior cervical discectomy and fusion with extension of plates. Presents to [facility] today for perioperative risk stratification and optimization. Denies any recent upper neck ROM or headaches. Endorses radiating numbness and tingling down left arm and bilateral hands at times. Denies any recent steroids or interventions in the last few months to the spine. Otherwise denies any recent illness, fever/chills, chest pains or shortness of breath.     Past Medical History:   Diagnosis Date    ADD (attention deficit disorder)     Cervical disc disease 2001    Cervical disc disorder 2022    Cervical radiculopathy     Hyperlipidemia     Joint pain     Low back pain 2004    Lumbosacral disc disease 2004    Neck pain 2004    Spinal stenosis 2010    Vision loss        Past Surgical History:   Procedure Laterality Date    ADENOIDECTOMY      ANTERIOR CERVICAL DISCECTOMY W/ FUSION      BACK SURGERY      CERVICAL FUSION  2003    EYE SURGERY      MICRODISCECTOMY LUMBAR      SPINE SURGERY      TONSILLECTOMY         Patient  has no history on file for sexual " activity.    Family History   Problem Relation Name Age of Onset    Prostate cancer Father Rococ Hall     Cancer Father Rocco Hall        No Known Allergies    Prior to Admission medications    Medication Sig Start Date End Date Taking? Authorizing Provider   chlorhexidine (Peridex) 0.12 % solution Swish and spit 15 ml for 2 doses, 15mL the night before surgery and 15 ml morning of surgery - swish for 30 seconds -DO NOT SWALLOW, SPIT OUT 2/20/25   AmeriCHACORTA Becerril-CNP   DULoxetine (Cymbalta) 30 mg DR capsule Take 1 capsule (30 mg) by mouth once daily. 5/28/24 5/28/25  Solo Andre DO   gabapentin (Neurontin) 300 mg capsule TAKE ONE CAPSULE BY MOUTH EVERY DAY AT BEDTIME 9/17/24   Solo Andre DO   hydrocortisone (Proctozone-HC) 2.5 % rectal cream Apply rectally twice daily as needed 5/20/21   Historical Provider, MD   ketoconazole (NIZOral) 2 % shampoo Wash the scalp daily when flared, or 3 times per week for maintenance. Lather and let sit for 3-5 minutes before rinsing. 2/27/23   Historical Provider, MD   lisdexamfetamine (Vyvanse) 20 mg capsule Take 1 capsule (20 mg) by mouth once daily in the morning. Do not fill before February 7, 2025. 2/7/25 3/9/25  Solo Andre DO   lisdexamfetamine (Vyvanse) 20 mg capsule Take 1 capsule (20 mg) by mouth once daily in the morning. 1/21/25 2/20/25  Solo Andre DO   lisdexamfetamine (Vyvanse) 30 mg capsule Take 1 capsule (30 mg) by mouth once daily in the morning. 1/22/25 2/21/25  Solo Andre DO   rosuvastatin (Crestor) 10 mg tablet Take 1 tablet (10 mg) by mouth once daily. 1/21/25   Solo Andre, DO        PAT ROS   Constitutional: Negative for fever, chills, or sweats   ENMT: Negative for nasal discharge, congestion, ear pain, mouth pain, throat pain + eyeglasses    Respiratory: Negative for cough, wheezing, shortness of breath   Cardiac: Negative for chest pain, dyspnea on exertion,  palpitations   Gastrointestinal: Negative for nausea, vomiting, diarrhea, constipation, abdominal pain  Genitourinary: Negative for dysuria, flank pain, frequency, hematuria     Musculoskeletal: Positive for pain and weakness in neck and lower back along with numbness & tingling down left arm and in hands     Neurological: Negative for dizziness, confusion, headache    Psychiatric: Negative for mood changes: + currently treated for ADD     Skin: Negative for itching, rash, ulcer    Hematologic/Lymph: Negative for bruising, easy bleeding  Allergic/Immunologic: Negative itching, sneezing, swelling      Physical Exam  Constitutional:       Appearance: Normal appearance.   HENT:      Head: Normocephalic.      Mouth/Throat:      Mouth: Mucous membranes are moist.   Eyes:      Extraocular Movements: Extraocular movements intact.   Cardiovascular:      Rate and Rhythm: Normal rate and regular rhythm.   Pulmonary:      Effort: Pulmonary effort is normal.      Breath sounds: Normal breath sounds.   Abdominal:      General: Abdomen is flat.      Palpations: Abdomen is soft.   Musculoskeletal:      Cervical back: Decreased range of motion.      Lumbar back: Decreased range of motion.   Skin:     General: Skin is warm and dry.   Neurological:      General: No focal deficit present.      Mental Status: He is alert.   Psychiatric:         Mood and Affect: Mood normal.          PAT AIRWAY:   Airway:     Mallampati::  II    Neck ROM::  Full  normal        Testing/Diagnostic:     Lab Results   Component Value Date    WBC 6.8 08/15/2024    HGB 15.1 08/15/2024    HCT 44.5 08/15/2024    MCV 95 08/15/2024     08/15/2024     Lab Results   Component Value Date    GLUCOSE 103 (H) 08/15/2024    CALCIUM 9.4 08/15/2024     08/15/2024    K 4.4 08/15/2024    CO2 26 08/15/2024     (H) 08/15/2024    BUN 19 08/15/2024    CREATININE 0.94 08/15/2024       Patient Specialist/PCP: Dr. Chen    Visit Vitals  /67   Pulse 58  "  Temp 36.1 °C (97 °F) (Temporal)   Resp 16   Ht 1.676 m (5' 6\")   Wt 89.8 kg (197 lb 15.6 oz)   SpO2 95%   BMI 31.95 kg/m²   Smoking Status Never   BSA 2.04 m²       DASI Risk Score      Flowsheet Row Pre-Admission Testing from 2/20/2025 in [facility]   Can you take care of yourself (eat, dress, bathe, or use toilet)?  2.75 filed at 02/19/2025 1301   Can you walk indoors, such as around your house? 1.75 filed at 02/19/2025 1301   Can you walk a block or two on level ground?  2.75 filed at 02/19/2025 1301   Can you climb a flight of stairs or walk up a hill? 5.5 filed at 02/19/2025 1301   Can you run a short distance? 0 filed at 02/19/2025 1301   Can you do light work around the house like dusting or washing dishes? 2.7 filed at 02/19/2025 1301   Can you do moderate work around the house like vacuuming, sweeping floors or carrying groceries? 3.5 filed at 02/19/2025 1301   Can you do heavy work around the house like scrubbing floors or lifting and moving heavy furniture?  0 filed at 02/19/2025 1301   Can you do yard work like raking leaves, weeding or pushing a mower? 4.5 filed at 02/19/2025 1301   Can you have sexual relations? 5.25 filed at 02/19/2025 1301   Can you participate in moderate recreational activities like golf, bowling, dancing, doubles tennis or throwing a baseball or football? 0 filed at 02/19/2025 1301   Can you participate in strenous sports like swimming, singles tennis, football, basketball, or skiing? 0 filed at 02/19/2025 1301   DASI SCORE 28.7 filed at 02/19/2025 1301   METS Score (Will be calculated only when all the questions are answered) 6.3 filed at 02/19/2025 1301          Caprini DVT Assessment      Flowsheet Row Pre-Admission Testing from 2/20/2025 in [facility]   DVT Score (IF A SCORE IS NOT CALCULATING, MUST SELECT A BMI TO COMPLETE) 8 filed at 02/19/2025 1303   Surgical Factors Major surgery planned, lasting 2-3 hours filed at 02/19/2025 1303   BMI " (BMI MUST BE CHOSEN) 31-40 (Obesity) filed at 02/19/2025 1303          Modified Frailty Index      Flowsheet Row Pre-Admission Testing from 2/20/2025 in [facility]   Non-independent functional status (problems with dressing, bathing, personal grooming, or cooking) 0 filed at 02/19/2025 1302   History of diabetes mellitus  0 filed at 02/19/2025 1302   History of COPD 0 filed at 02/19/2025 1302   History of CHF No filed at 02/19/2025 1302   History of MI 0 filed at 02/19/2025 1302   History of Percutaneous Coronary Intervention, Cardiac Surgery, or Angina No filed at 02/19/2025 1302   Hypertension requiring the use of medication  0 filed at 02/19/2025 1302   Peripheral vascular disease 0 filed at 02/19/2025 1302   Impaired sensorium (cognitive impairement or loss, clouding, or delirium) 0 filed at 02/19/2025 1302   TIA or CVA withouy residual deficit 0 filed at 02/19/2025 1302   Cerebrovascular accident with deficit 0 filed at 02/19/2025 1302   Modified Frailty Index Calculator 0 filed at 02/19/2025 1302          CHADS2 Stroke Risk  Current as of about an hour ago        N/A 3 to 100%: High Risk   2 to < 3%: Medium Risk   0 to < 2%: Low Risk     Last Change: N/A          This score determines the patient's risk of having a stroke if the patient has atrial fibrillation.        This score is not applicable to this patient. Components are not calculated.          Revised Cardiac Risk Index      Flowsheet Row Pre-Admission Testing from 2/20/2025 in [facility]   High-Risk Surgery (Intraperitoneal, Intrathoracic,Suprainguinal vascular) 0 filed at 02/19/2025 1302   History of ischemic heart disease (History of MI, History of positive exercuse test, Current chest paint considered due to myocardial ischemia, Use of nitrate therapy, ECG with pathological Q Waves) 0 filed at 02/19/2025 1302   History of congestive heart failure (pulmonary edemia, bilateral rales or S3 gallop, Paroxysmal nocturnal  dyspnea, CXR showing pulmonary vascular redistribution) 0 filed at 2025 1302   History of cerebrovascular disease (Prior TIA or stroke) 0 filed at 2025 1302   Pre-operative insulin treatment 0 filed at 2025 1302   Pre-operative creatinine>2 mg/dl 0 filed at 2025 1302   Revised Cardiac Risk Calculator 0 filed at 2025 1302          Apfel Simplified Score      Flowsheet Row Pre-Admission Testing from 2025 in [facility]   Smoking status 1 filed at 2025 1302   History of motion sickness or PONV  0 filed at 2025 1302   Use of postoperative opioids 1 filed at 2025 1302   Gender - Female 0=No filed at 2025 1302   Apfel Simplified Score Calculator 2 filed at 2025 1302          Risk Analysis Index Results This Encounter         2025  1302             Do you live in a place other than your own home?: 0    When did you begin living in the place you are currently residing?: Greater than one year ago    Any kidney failure, kidney not working well, or seeing a kidney doctor (nephrologist)? If yes, was this for kidney stones or another problem?: 0 No    Any history of chronic (long-term) congestive heart failure (CHF)?: 0 No    Any shortness of breath when resting?: 0 No    In the past five years, have you been diagnosed with or treated for cancer?: No    During the last 3 months has it become difficult for you to remember things or organize your thoughts?: 0 No    Have you lost weight of 10 pounds or more in the past 3 months without trying?: 0 No    Do you have any loss of appetitie?: 0 No    Getting Around (Mobility): 0 Can get around without help    Eatin Can plan and prepare own meals    Toiletin Can use toilet without any help    Personal Hygiene (Bathing, Hand Washing, Changing Clothes): 0 Can shower or bathe without any help    MCCOLLUM Cancer History: Patient does not indicate history of cancer    Total Risk Analysis Index Score  Without Cancer: 21    Total Risk Analysis Index Score: 21          Stop Bang Score      Flowsheet Row Pre-Admission Testing from 2/20/2025 in [facility]   Do you snore loudly? 1 filed at 02/19/2025 1301   Do you often feel tired or fatigued after your sleep? 0 filed at 02/19/2025 1301   Has anyone ever observed you stop breathing in your sleep? 0 filed at 02/19/2025 1301   Do you have or are you being treated for high blood pressure? 0 filed at 02/19/2025 1301   Recent BMI (Calculated) 32.3 filed at 02/19/2025 1301   Is BMI greater than 35 kg/m2? 0=No filed at 02/19/2025 1301   Age older than 50 years old? 1=Yes filed at 02/19/2025 1301   Is your neck circumference greater than 17 inches (Male) or 16 inches (Female)? 1 filed at 02/19/2025 1301   Gender - Male 1=Yes filed at 02/19/2025 1301   STOP-BANG Total Score 4 filed at 02/19/2025 1301          Prodigy: High Risk  Total Score: 16              Prodigy Age Score      Prodigy Gender Score          ARISCAT Score for Postoperative Pulmonary Complications      Flowsheet Row Pre-Admission Testing from 2/20/2025 in [facility]   Age Calculated Score 3 filed at 02/19/2025 1304   Preoperative SpO2 0 filed at 02/19/2025 1304   Respiratory infection in the last month Either upper or lower (i.e., URI, bronchitis, pneumonia), with fever and antibiotic treatment 0 filed at 02/19/2025 1304   Preoperative anemia (Hgb less than 10 g/dl) 0 filed at 02/19/2025 1304   Surgical incision  0 filed at 02/19/2025 1304   Duration of surgery  16 filed at 02/19/2025 1304   Emergency Procedure  0 filed at 02/19/2025 1304   ARISCAT Total Score  19 filed at 02/19/2025 1304          Duggan Perioperative Risk for Myocardial Infarction or Cardiac Arrest (ANSHU)      Flowsheet Row Pre-Admission Testing from 2/20/2025 in [facility]   Calculated Age Score 1.2 filed at 02/19/2025 1304   Functional Status  0 filed at 02/19/2025 1304   ASA Class  -5.17 filed  at 2025 1304   Creatinine 0 filed at 2025 1304   Type of Procedure  0.21 filed at 2025 1304   ANSHU Total Score  -9.01 filed at 2025 1304   ANSHU % 0.01 filed at 2025 1304            Assessment and Plan:     Pre-Op  60 yo male scheduled for C5-C6 Redo Anterior Cervical Discectomy and Fusion, extension of plate C5-C7 on 3/6/2025 with Dr. Donahue. Blood work and MRSA ordered- oral chlorahexidine prescribed. EKG shows sinus bradycardia, v rate of 56 bpm. Otherwise no further orders indicated.    Cardiac  Hyperlipidemia, taking statin  DASI Score: 28.7   MET Score: 6.3  RCRI  0 which is 3.9% 30 day risk of MACE (risk for cardiac death, nonfatal myocardial infarction, and nonfactal cardiac arrest)  ANSHU score which indicates a  0.01 % risk of intraoperative or 30-day postoperative MACE    Pulmonary  No diagnosis or significant findings on chart review or clinical presentation and evaluation.    Preoperative deep breathing educational handout provided to patient.  STOP BAN   points which is a intermediate risk for moderate to severe BARBARA  ARISCAT:   19   points which is a low (1.6%) risk of in-hospital post-op pulmonary complications     Neuro  -Cervical radiculopathy, radiating down left arm, taking Cymbalta and Neurontin  -Cervical spinal stenosis with history of surgery in , ablation over one year ago   -Lumbosacral disc disease with history of surgery in     Endocrine  Preoperative hemoglobin A1c ordered    GI  Constipation, taking laxatives as needed  Apfel: 2 points 39% risk for post operative N/V    /Renal  Counseled on avoiding NSAIDs, adequate hydration    Hematologic  No hematological medical history, however due to high Caprini score of  8 patient is at HIGH risk for perioperative DVT, informative education handout provided    Psychiatric  ADD, taking Vyvanse daily for work     Skin   History of basal cell carcinoma, forehead with history of excison  -Patient was  instructed to make surgeon aware of any skin changes/concerns prior to surgery.     Anesthesia:  Patient denies any anesthesia complications.     See risk scores as previously documented.

## 2025-02-20 NOTE — PREPROCEDURE INSTRUCTIONS
Thank you for visiting Preadmission Testing at [facility]. If you have any changes to your health condition, please call the SURGEON's office to alert them and give them details of your symptoms.        Preoperative Brain Exercises    What are brain exercises?  A brain exercise is any activity that engages your thinking (cognitive) skills.    What types of activities are considered brain exercises?  Jigsaw puzzles, crossword puzzles, word jumble, memory games, word search, and many more.  Many can be found free online or on your phone via a mobile shaq.    Why should I do brain exercises before my surgery?  More recent research has shown brain exercise before surgery can lower the risk of postoperative delirium (confusion) which can be especially important for older adults.  Patients who did brain exercises for 5 to 10 hours the days before surgery, cut their risk of postoperative delirium in half up to 1 week after surgery.      Preoperative Deep Breathing Exercises    Why it is important to do deep breathing exercises before my surgery?  Deep breathing exercises strengthen your breathing muscles.  This helps you to recover after your surgery and decreases the chance of breathing complications.    How are the deep breathing exercises done?  Sit straight with your back supported.  Breathe in deeply and slowly through your nose. Your lower rib cage should expand and your abdomen may move forward.  Hold that breath for 3 to 5 seconds.  Breathe out through pursed lips, slowly and completely.  Rest and repeat 10 times every hour while awake.  Rest longer if you become dizzy or lightheaded.      Patient and Family Education   Ways You Can Help Prevent Blood Clots     This handout explains some simple things you can do to help prevent blood clots.      Blood clots are blockages that can form in the body's veins. When a blood clot forms in your deep veins, it may be called a deep vein thrombosis, or DVT for short. Blood clots can  happen in any part of the body where blood flows, but they are most common in the arms and legs. If a piece of a blood clot breaks free and travels to the lungs, it is called a pulmonary embolus (PE). A PE can be a very serious problem.      Being in the hospital or having surgery can raise your chances of getting a blood clot because you may not be well enough to move around as much as you normally do.      Ways you can help prevent blood clots in the hospital         Wearing SCDs. SCDs stands for Sequential Compression Devices.   SCDs are special sleeves that wrap around your legs  They attach to a pump that fills them with air to gently squeeze your legs every few minutes.   This helps return the blood in your legs to your heart.   SCDs should only be taken off when walking or bathing.   SCDs may not be comfortable, but they can help save your life.               Wearing compression stockings - if your doctor orders them. These special snug fitting stockings gently squeeze your legs to help blood flow.       Walking. Walking helps move the blood in your legs.   If your doctor says it is ok, try walking the halls at least   5 times a day. Ask us to help you get up, so you don't fall.      Taking any blood thinning medicines your doctor orders.          ©[facility]; 3/23        Ways you can help prevent blood clots at home       Wearing compression stockings - if your doctor orders them. ? Walking - to help move the blood in your legs.       Taking any blood thinning medicines your doctor orders.      Signs of a blood clot or PE      Tell your doctor or nurse know right away if you have of the problems listed below.    If you are at home, seek medical care right away. Call 911 for chest pain or problems breathing.          Signs of a blood clot (DVT) - such as pain,  swelling, redness or warmth in your arm or leg      Signs of a pulmonary embolism (PE) - such as chest     pain or feeling short of breath

## 2025-02-20 NOTE — PREPROCEDURE INSTRUCTIONS
Medication List            Accurate as of February 20, 2025  1:28 PM. Always use your most recent med list.                alpha lipoic acid 600 mg capsule  Additional Medication Adjustments for Surgery: Take last dose 7 days before surgery  Notes to patient: STOP all NSAIDS (Ibuprofen, Motrin, Aleve), vitamins, herbals, supplements, and all over the counter medications for 7 days prior to surgery    Tylenol is okay to take up until the morning of surgery for pain or headache if needed      ascorbic acid 1,000 mg tablet  Commonly known as: Vitamin C  Additional Medication Adjustments for Surgery: Take last dose 7 days before surgery     chlorhexidine 0.12 % solution  Commonly known as: Peridex  Swish and spit 15 ml for 2 doses, 15mL the night before surgery and 15 ml morning of surgery - swish for 30 seconds -DO NOT SWALLOW, SPIT OUT  Medication Adjustments for Surgery: Take/Use as prescribed     cholecalciferol 5,000 Units tablet  Commonly known as: Vitamin D-3  Additional Medication Adjustments for Surgery: Take last dose 7 days before surgery     DULoxetine 30 mg DR capsule  Commonly known as: Cymbalta  Take 1 capsule (30 mg) by mouth once daily.  Medication Adjustments for Surgery: Take/Use as prescribed     gabapentin 300 mg capsule  Commonly known as: Neurontin  TAKE ONE CAPSULE BY MOUTH EVERY DAY AT BEDTIME  Medication Adjustments for Surgery: Take/Use as prescribed     * lisdexamfetamine 20 mg capsule  Commonly known as: Vyvanse  Take 1 capsule (20 mg) by mouth once daily in the morning.  Medication Adjustments for Surgery: Do Not take on the morning of surgery     * lisdexamfetamine 30 mg capsule  Commonly known as: Vyvanse  Take 1 capsule (30 mg) by mouth once daily in the morning.  Medication Adjustments for Surgery: Do Not take on the morning of surgery     * lisdexamfetamine 20 mg capsule  Commonly known as: Vyvanse  Take 1 capsule (20 mg) by mouth once daily in the morning. Do not fill before  February 7, 2025.  Medication Adjustments for Surgery: Do Not take on the morning of surgery     multivitamin with minerals tablet  Additional Medication Adjustments for Surgery: Take last dose 7 days before surgery     Osteo Bi-Flex Triple Strength 750 mg-644 mg- 30 mg-1 mg tablet  Generic drug: [id number]-[id number]  Additional Medication Adjustments for Surgery: Take last dose 7 days before surgery     Proctozone-HC 2.5 % rectal cream  Generic drug: hydrocortisone  Medication Adjustments for Surgery: Do Not take on the morning of surgery     psyllium 3.4 gram packet  Commonly known as: Metamucil  Medication Adjustments for Surgery: Do Not take on the morning of surgery     rosuvastatin 10 mg tablet  Commonly known as: Crestor  Take 1 tablet (10 mg) by mouth once daily.  Medication Adjustments for Surgery: Take/Use as prescribed     saw-[place]-beta-herb-D3-B6-Zn 30-25-62.5 mg capsule  Additional Medication Adjustments for Surgery: Take last dose 7 days before surgery           * This list has 3 medication(s) that are the same as other medications prescribed for you. Read the directions carefully, and ask your doctor or other care provider to review them with you.                      PRE-OPERATIVE INSTRUCTIONS    You will receive notification one business day prior to your procedure to confirm your arrival time. It is important that you answer your phone and/or check your messages during this time. If you do not hear from the surgery center by 5 pm. the day before your procedure, please call [phone].     Please enter the building through the Outpatient entrance and take the elevator off the lobby to the 2nd floor then check in at the Outpatient Surgery desk on the 2nd floor.    INSTRUCTIONS:  Talk to your surgeon for instructions if you should stop your aspirin, blood thinner, or diabetes medicines.  DO NOT take any multivitamins or over the counter supplements for 7-10 days before surgery.  If not  being admitted, you must have an adult immediately available to drive you home after surgery. We also highly recommend you have someone stay with you for the entire day and night of your surgery.  For children having surgery, a parent or legal guardian must accompany them to the surgery center. If this is not possible, please call [phone] to make additional arrangements.  For adults who are unable to consent or make medical decisions for themselves, a legal guardian or Power of  must accompany them to the surgery center. If this is not possible, please call [phone] to make additional arrangements.  Wear comfortable, loose fitting clothing.  All jewelry and piercings must be removed. If you are unable to remove an item or have a dermal piercing, please be sure to tell the nurse when you arrive for surgery.  Nail polish and make-up must be removed.  Avoid smoking or consuming alcohol for 24 hours before surgery.  To help prevent infection, please take a shower/bath and wash your hair the night before and/or morning of surgery (or follow other specific bathing instructions provided).    Preoperative Fasting Guidelines    Why must I stop eating and drinking near surgery time?  With sedation, food or liquid in your stomach can enter your lungs causing serious complications  Increases nausea and vomiting    When do I need to stop eating and drinking before my surgery?  Do not eat any solid food after midnight the night before your surgery/procedure unless otherwise instructed by your surgeon.   You may have up to 13.5 ounces of clear liquid until TWO hours before your instructed arrival time to the hospital.  This includes water, black tea/coffee, (no milk or cream) apple juice, and electrolyte drinks (Gatorade).   You may chew gum until TWO hours before your surgery/procedure      If applicable, notify your surgeons office immediately of any new skin changes that occur to the surgical limb.      If you  have any questions or concerns, please call Pre-Admission Testing at [phone].

## 2025-02-21 LAB
ABO GROUP (TYPE) IN BLOOD: NORMAL
ANTIBODY SCREEN: NORMAL
ATRIAL RATE: 56 BPM
EST. AVERAGE GLUCOSE BLD GHB EST-MCNC: 120 MG/DL
HBA1C MFR BLD: 5.8 %
P AXIS: 16 DEGREES
P OFFSET: 189 MS
P ONSET: 142 MS
PR INTERVAL: 136 MS
PREALB SERPL-MCNC: 26.9 MG/DL (ref 18–40)
Q ONSET: 210 MS
QRS COUNT: 9 BEATS
QRS DURATION: 94 MS
QT INTERVAL: 424 MS
QTC CALCULATION(BAZETT): 409 MS
QTC FREDERICIA: 414 MS
R AXIS: 11 DEGREES
RH FACTOR (ANTIGEN D): NORMAL
T AXIS: 48 DEGREES
T OFFSET: 422 MS
VENTRICULAR RATE: 56 BPM

## 2025-02-22 LAB — STAPHYLOCOCCUS SPEC CULT: ABNORMAL

## 2025-02-25 DIAGNOSIS — M51.26 HERNIATED LUMBAR INTERVERTEBRAL DISC: ICD-10-CM

## 2025-02-25 RX ORDER — GABAPENTIN 300 MG/1
300 CAPSULE ORAL NIGHTLY
Qty: 90 CAPSULE | Refills: 0 | Status: SHIPPED | OUTPATIENT
Start: 2025-02-25 | End: 2025-02-27

## 2025-02-27 RX ORDER — GABAPENTIN 300 MG/1
300 CAPSULE ORAL NIGHTLY
Qty: 90 CAPSULE | Refills: 0 | Status: SHIPPED | OUTPATIENT
Start: 2025-02-27

## 2025-03-05 ENCOUNTER — ANESTHESIA EVENT (OUTPATIENT)
Dept: OPERATING ROOM | Facility: HOSPITAL | Age: 60
End: 2025-03-05
Payer: COMMERCIAL

## 2025-03-06 ENCOUNTER — APPOINTMENT (OUTPATIENT)
Dept: RADIOLOGY | Facility: HOSPITAL | Age: 60
End: 2025-03-06
Payer: COMMERCIAL

## 2025-03-06 ENCOUNTER — ANESTHESIA (OUTPATIENT)
Dept: OPERATING ROOM | Facility: HOSPITAL | Age: 60
End: 2025-03-06
Payer: COMMERCIAL

## 2025-03-06 ENCOUNTER — HOSPITAL ENCOUNTER (OUTPATIENT)
Facility: HOSPITAL | Age: 60
Discharge: HOME | End: 2025-03-07
Attending: STUDENT IN AN ORGANIZED HEALTH CARE EDUCATION/TRAINING PROGRAM | Admitting: STUDENT IN AN ORGANIZED HEALTH CARE EDUCATION/TRAINING PROGRAM
Payer: COMMERCIAL

## 2025-03-06 DIAGNOSIS — M54.12 CERVICAL RADICULOPATHY: Primary | ICD-10-CM

## 2025-03-06 PROCEDURE — 2500000002 HC RX 250 W HCPCS SELF ADMINISTERED DRUGS (ALT 637 FOR MEDICARE OP, ALT 636 FOR OP/ED): Performed by: STUDENT IN AN ORGANIZED HEALTH CARE EDUCATION/TRAINING PROGRAM

## 2025-03-06 PROCEDURE — 2500000004 HC RX 250 GENERAL PHARMACY W/ HCPCS (ALT 636 FOR OP/ED): Performed by: STUDENT IN AN ORGANIZED HEALTH CARE EDUCATION/TRAINING PROGRAM

## 2025-03-06 PROCEDURE — 3600000018 HC OR TIME - INITIAL BASE CHARGE - PROCEDURE LEVEL SIX: Performed by: STUDENT IN AN ORGANIZED HEALTH CARE EDUCATION/TRAINING PROGRAM

## 2025-03-06 PROCEDURE — 2500000005 HC RX 250 GENERAL PHARMACY W/O HCPCS: Performed by: ANESTHESIOLOGIST ASSISTANT

## 2025-03-06 PROCEDURE — 3700000002 HC GENERAL ANESTHESIA TIME - EACH INCREMENTAL 1 MINUTE: Performed by: STUDENT IN AN ORGANIZED HEALTH CARE EDUCATION/TRAINING PROGRAM

## 2025-03-06 PROCEDURE — 31575 DIAGNOSTIC LARYNGOSCOPY: CPT | Performed by: OTOLARYNGOLOGY

## 2025-03-06 PROCEDURE — 3600000017 HC OR TIME - EACH INCREMENTAL 1 MINUTE - PROCEDURE LEVEL SIX: Performed by: STUDENT IN AN ORGANIZED HEALTH CARE EDUCATION/TRAINING PROGRAM

## 2025-03-06 PROCEDURE — 22551 ARTHRD ANT NTRBDY CERVICAL: CPT | Performed by: OTOLARYNGOLOGY

## 2025-03-06 PROCEDURE — A22551 PR ARTHRODESIS ANT INTERBODY INC DISCECTOMY, CERVICAL BELOW C2: Performed by: ANESTHESIOLOGIST ASSISTANT

## 2025-03-06 PROCEDURE — 2500000005 HC RX 250 GENERAL PHARMACY W/O HCPCS: Performed by: STUDENT IN AN ORGANIZED HEALTH CARE EDUCATION/TRAINING PROGRAM

## 2025-03-06 PROCEDURE — 2780000003 HC OR 278 NO HCPCS: Performed by: STUDENT IN AN ORGANIZED HEALTH CARE EDUCATION/TRAINING PROGRAM

## 2025-03-06 PROCEDURE — 22845 INSERT SPINE FIXATION DEVICE: CPT | Performed by: STUDENT IN AN ORGANIZED HEALTH CARE EDUCATION/TRAINING PROGRAM

## 2025-03-06 PROCEDURE — A22551 PR ARTHRODESIS ANT INTERBODY INC DISCECTOMY, CERVICAL BELOW C2: Performed by: ANESTHESIOLOGY

## 2025-03-06 PROCEDURE — 7100000001 HC RECOVERY ROOM TIME - INITIAL BASE CHARGE: Performed by: STUDENT IN AN ORGANIZED HEALTH CARE EDUCATION/TRAINING PROGRAM

## 2025-03-06 PROCEDURE — 20931 SP BONE ALGRFT STRUCT ADD-ON: CPT | Performed by: STUDENT IN AN ORGANIZED HEALTH CARE EDUCATION/TRAINING PROGRAM

## 2025-03-06 PROCEDURE — 2500000001 HC RX 250 WO HCPCS SELF ADMINISTERED DRUGS (ALT 637 FOR MEDICARE OP): Performed by: STUDENT IN AN ORGANIZED HEALTH CARE EDUCATION/TRAINING PROGRAM

## 2025-03-06 PROCEDURE — 22551 ARTHRD ANT NTRBDY CERVICAL: CPT | Performed by: STUDENT IN AN ORGANIZED HEALTH CARE EDUCATION/TRAINING PROGRAM

## 2025-03-06 PROCEDURE — 7100000002 HC RECOVERY ROOM TIME - EACH INCREMENTAL 1 MINUTE: Performed by: STUDENT IN AN ORGANIZED HEALTH CARE EDUCATION/TRAINING PROGRAM

## 2025-03-06 PROCEDURE — C9359 IMPLNT,BON VOID FILLER-PUTTY: HCPCS | Performed by: STUDENT IN AN ORGANIZED HEALTH CARE EDUCATION/TRAINING PROGRAM

## 2025-03-06 PROCEDURE — 7100000011 HC EXTENDED STAY RECOVERY HOURLY - NURSING UNIT

## 2025-03-06 PROCEDURE — 2720000007 HC OR 272 NO HCPCS: Performed by: STUDENT IN AN ORGANIZED HEALTH CARE EDUCATION/TRAINING PROGRAM

## 2025-03-06 PROCEDURE — C1713 ANCHOR/SCREW BN/BN,TIS/BN: HCPCS | Performed by: STUDENT IN AN ORGANIZED HEALTH CARE EDUCATION/TRAINING PROGRAM

## 2025-03-06 PROCEDURE — 2500000004 HC RX 250 GENERAL PHARMACY W/ HCPCS (ALT 636 FOR OP/ED): Performed by: ANESTHESIOLOGIST ASSISTANT

## 2025-03-06 PROCEDURE — 3700000001 HC GENERAL ANESTHESIA TIME - INITIAL BASE CHARGE: Performed by: STUDENT IN AN ORGANIZED HEALTH CARE EDUCATION/TRAINING PROGRAM

## 2025-03-06 DEVICE — IMPLANTABLE DEVICE: Type: IMPLANTABLE DEVICE | Site: SPINE CERVICAL | Status: FUNCTIONAL

## 2025-03-06 DEVICE — SCREW, ACP, SELF DRILL, 3.5 X 17MM, VARIABLE: Type: IMPLANTABLE DEVICE | Site: SPINE CERVICAL | Status: FUNCTIONAL

## 2025-03-06 RX ORDER — HEPARIN SODIUM 5000 [USP'U]/ML
5000 INJECTION, SOLUTION INTRAVENOUS; SUBCUTANEOUS EVERY 8 HOURS
Status: DISCONTINUED | OUTPATIENT
Start: 2025-03-07 | End: 2025-03-07 | Stop reason: HOSPADM

## 2025-03-06 RX ORDER — ROSUVASTATIN CALCIUM 10 MG/1
10 TABLET, COATED ORAL NIGHTLY
Status: DISCONTINUED | OUTPATIENT
Start: 2025-03-06 | End: 2025-03-07 | Stop reason: HOSPADM

## 2025-03-06 RX ORDER — DIPHENHYDRAMINE HYDROCHLORIDE 50 MG/ML
12.5 INJECTION INTRAMUSCULAR; INTRAVENOUS ONCE AS NEEDED
Status: DISCONTINUED | OUTPATIENT
Start: 2025-03-06 | End: 2025-03-06 | Stop reason: HOSPADM

## 2025-03-06 RX ORDER — POLYETHYLENE GLYCOL 3350 17 G/17G
17 POWDER, FOR SOLUTION ORAL DAILY
Status: DISCONTINUED | OUTPATIENT
Start: 2025-03-06 | End: 2025-03-07 | Stop reason: HOSPADM

## 2025-03-06 RX ORDER — GABAPENTIN 300 MG/1
300 CAPSULE ORAL NIGHTLY
Status: DISCONTINUED | OUTPATIENT
Start: 2025-03-06 | End: 2025-03-07 | Stop reason: HOSPADM

## 2025-03-06 RX ORDER — SODIUM CHLORIDE, SODIUM LACTATE, POTASSIUM CHLORIDE, CALCIUM CHLORIDE 600; 310; 30; 20 MG/100ML; MG/100ML; MG/100ML; MG/100ML
100 INJECTION, SOLUTION INTRAVENOUS CONTINUOUS
Status: ACTIVE | OUTPATIENT
Start: 2025-03-06 | End: 2025-03-07

## 2025-03-06 RX ORDER — ASCORBIC ACID 500 MG
1000 TABLET ORAL DAILY
Status: DISCONTINUED | OUTPATIENT
Start: 2025-03-06 | End: 2025-03-07 | Stop reason: HOSPADM

## 2025-03-06 RX ORDER — METOCLOPRAMIDE HYDROCHLORIDE 5 MG/ML
10 INJECTION INTRAMUSCULAR; INTRAVENOUS EVERY 6 HOURS PRN
Status: DISCONTINUED | OUTPATIENT
Start: 2025-03-06 | End: 2025-03-07 | Stop reason: HOSPADM

## 2025-03-06 RX ORDER — FENTANYL CITRATE 50 UG/ML
INJECTION, SOLUTION INTRAMUSCULAR; INTRAVENOUS AS NEEDED
Status: DISCONTINUED | OUTPATIENT
Start: 2025-03-06 | End: 2025-03-06

## 2025-03-06 RX ORDER — LIDOCAINE HYDROCHLORIDE 20 MG/ML
INJECTION, SOLUTION EPIDURAL; INFILTRATION; INTRACAUDAL; PERINEURAL AS NEEDED
Status: DISCONTINUED | OUTPATIENT
Start: 2025-03-06 | End: 2025-03-06

## 2025-03-06 RX ORDER — ONDANSETRON 4 MG/1
4 TABLET, FILM COATED ORAL EVERY 8 HOURS PRN
Status: DISCONTINUED | OUTPATIENT
Start: 2025-03-06 | End: 2025-03-07 | Stop reason: HOSPADM

## 2025-03-06 RX ORDER — OXYCODONE HYDROCHLORIDE 5 MG/1
2.5 TABLET ORAL EVERY 4 HOURS PRN
Status: DISCONTINUED | OUTPATIENT
Start: 2025-03-06 | End: 2025-03-07 | Stop reason: HOSPADM

## 2025-03-06 RX ORDER — HYDROCORTISONE 25 MG/G
1 CREAM TOPICAL 2 TIMES DAILY PRN
Status: DISCONTINUED | OUTPATIENT
Start: 2025-03-06 | End: 2025-03-07 | Stop reason: HOSPADM

## 2025-03-06 RX ORDER — DEXTROSE 50 % IN WATER (D50W) INTRAVENOUS SYRINGE
12.5
Status: DISCONTINUED | OUTPATIENT
Start: 2025-03-06 | End: 2025-03-07 | Stop reason: HOSPADM

## 2025-03-06 RX ORDER — CELECOXIB 200 MG/1
400 CAPSULE ORAL ONCE
Status: COMPLETED | OUTPATIENT
Start: 2025-03-06 | End: 2025-03-06

## 2025-03-06 RX ORDER — HYDROMORPHONE HYDROCHLORIDE 1 MG/ML
1 INJECTION, SOLUTION INTRAMUSCULAR; INTRAVENOUS; SUBCUTANEOUS EVERY 5 MIN PRN
Status: DISCONTINUED | OUTPATIENT
Start: 2025-03-06 | End: 2025-03-06 | Stop reason: HOSPADM

## 2025-03-06 RX ORDER — LABETALOL HYDROCHLORIDE 5 MG/ML
5 INJECTION, SOLUTION INTRAVENOUS
Status: DISCONTINUED | OUTPATIENT
Start: 2025-03-06 | End: 2025-03-06 | Stop reason: HOSPADM

## 2025-03-06 RX ORDER — MAGNESIUM SULFATE HEPTAHYDRATE 500 MG/ML
INJECTION, SOLUTION INTRAMUSCULAR; INTRAVENOUS AS NEEDED
Status: DISCONTINUED | OUTPATIENT
Start: 2025-03-06 | End: 2025-03-06

## 2025-03-06 RX ORDER — NALOXONE HYDROCHLORIDE 0.4 MG/ML
0.2 INJECTION, SOLUTION INTRAMUSCULAR; INTRAVENOUS; SUBCUTANEOUS EVERY 5 MIN PRN
Status: DISCONTINUED | OUTPATIENT
Start: 2025-03-06 | End: 2025-03-07 | Stop reason: HOSPADM

## 2025-03-06 RX ORDER — ROCURONIUM BROMIDE 50 MG/5 ML
SYRINGE (ML) INTRAVENOUS AS NEEDED
Status: DISCONTINUED | OUTPATIENT
Start: 2025-03-06 | End: 2025-03-06

## 2025-03-06 RX ORDER — CYCLOBENZAPRINE HCL 10 MG
10 TABLET ORAL 3 TIMES DAILY PRN
Status: DISCONTINUED | OUTPATIENT
Start: 2025-03-06 | End: 2025-03-07 | Stop reason: HOSPADM

## 2025-03-06 RX ORDER — HYDROCODONE BITARTRATE AND ACETAMINOPHEN 5; 325 MG/1; MG/1
1 TABLET ORAL EVERY 4 HOURS PRN
Status: DISCONTINUED | OUTPATIENT
Start: 2025-03-06 | End: 2025-03-06 | Stop reason: HOSPADM

## 2025-03-06 RX ORDER — ONDANSETRON HYDROCHLORIDE 2 MG/ML
4 INJECTION, SOLUTION INTRAVENOUS EVERY 8 HOURS PRN
Status: DISCONTINUED | OUTPATIENT
Start: 2025-03-06 | End: 2025-03-07 | Stop reason: HOSPADM

## 2025-03-06 RX ORDER — HYDROMORPHONE HYDROCHLORIDE 1 MG/ML
INJECTION, SOLUTION INTRAMUSCULAR; INTRAVENOUS; SUBCUTANEOUS AS NEEDED
Status: DISCONTINUED | OUTPATIENT
Start: 2025-03-06 | End: 2025-03-06

## 2025-03-06 RX ORDER — TRANEXAMIC ACID 650 MG/1
1300 TABLET ORAL ONCE
Status: COMPLETED | OUTPATIENT
Start: 2025-03-06 | End: 2025-03-06

## 2025-03-06 RX ORDER — MIDAZOLAM HYDROCHLORIDE 1 MG/ML
1 INJECTION, SOLUTION INTRAMUSCULAR; INTRAVENOUS ONCE AS NEEDED
Status: DISCONTINUED | OUTPATIENT
Start: 2025-03-06 | End: 2025-03-06 | Stop reason: HOSPADM

## 2025-03-06 RX ORDER — MIDAZOLAM HYDROCHLORIDE 1 MG/ML
INJECTION, SOLUTION INTRAMUSCULAR; INTRAVENOUS AS NEEDED
Status: DISCONTINUED | OUTPATIENT
Start: 2025-03-06 | End: 2025-03-06

## 2025-03-06 RX ORDER — LISDEXAMFETAMINE DIMESYLATE 20 MG/1
20 CAPSULE ORAL EVERY MORNING
Status: DISCONTINUED | OUTPATIENT
Start: 2025-03-06 | End: 2025-03-07 | Stop reason: HOSPADM

## 2025-03-06 RX ORDER — OXYCODONE HYDROCHLORIDE 10 MG/1
10 TABLET ORAL EVERY 4 HOURS PRN
Status: DISCONTINUED | OUTPATIENT
Start: 2025-03-06 | End: 2025-03-07 | Stop reason: HOSPADM

## 2025-03-06 RX ORDER — ACETAMINOPHEN 325 MG/1
650 TABLET ORAL EVERY 6 HOURS
Status: DISCONTINUED | OUTPATIENT
Start: 2025-03-06 | End: 2025-03-07 | Stop reason: HOSPADM

## 2025-03-06 RX ORDER — PROPOFOL 10 MG/ML
INJECTION, EMULSION INTRAVENOUS AS NEEDED
Status: DISCONTINUED | OUTPATIENT
Start: 2025-03-06 | End: 2025-03-06

## 2025-03-06 RX ORDER — DEXTROSE 50 % IN WATER (D50W) INTRAVENOUS SYRINGE
25
Status: DISCONTINUED | OUTPATIENT
Start: 2025-03-06 | End: 2025-03-07 | Stop reason: HOSPADM

## 2025-03-06 RX ORDER — SODIUM CHLORIDE, SODIUM LACTATE, POTASSIUM CHLORIDE, CALCIUM CHLORIDE 600; 310; 30; 20 MG/100ML; MG/100ML; MG/100ML; MG/100ML
INJECTION, SOLUTION INTRAVENOUS CONTINUOUS PRN
Status: DISCONTINUED | OUTPATIENT
Start: 2025-03-06 | End: 2025-03-06

## 2025-03-06 RX ORDER — HYDROMORPHONE HYDROCHLORIDE 0.2 MG/ML
0.2 INJECTION INTRAMUSCULAR; INTRAVENOUS; SUBCUTANEOUS EVERY 4 HOURS PRN
Status: DISCONTINUED | OUTPATIENT
Start: 2025-03-06 | End: 2025-03-07 | Stop reason: HOSPADM

## 2025-03-06 RX ORDER — ACETAMINOPHEN 325 MG/1
975 TABLET ORAL ONCE
Status: COMPLETED | OUTPATIENT
Start: 2025-03-06 | End: 2025-03-06

## 2025-03-06 RX ORDER — METOCLOPRAMIDE HYDROCHLORIDE 5 MG/ML
10 INJECTION INTRAMUSCULAR; INTRAVENOUS ONCE AS NEEDED
Status: DISCONTINUED | OUTPATIENT
Start: 2025-03-06 | End: 2025-03-06 | Stop reason: HOSPADM

## 2025-03-06 RX ORDER — DEXAMETHASONE SODIUM PHOSPHATE 10 MG/ML
INJECTION INTRAMUSCULAR; INTRAVENOUS AS NEEDED
Status: DISCONTINUED | OUTPATIENT
Start: 2025-03-06 | End: 2025-03-06

## 2025-03-06 RX ORDER — ACETAMINOPHEN 500 MG
5000 TABLET ORAL DAILY
Status: DISCONTINUED | OUTPATIENT
Start: 2025-03-06 | End: 2025-03-07 | Stop reason: HOSPADM

## 2025-03-06 RX ORDER — SODIUM CHLORIDE, SODIUM LACTATE, POTASSIUM CHLORIDE, CALCIUM CHLORIDE 600; 310; 30; 20 MG/100ML; MG/100ML; MG/100ML; MG/100ML
100 INJECTION, SOLUTION INTRAVENOUS CONTINUOUS
Status: DISCONTINUED | OUTPATIENT
Start: 2025-03-06 | End: 2025-03-06 | Stop reason: HOSPADM

## 2025-03-06 RX ORDER — HYDRALAZINE HYDROCHLORIDE 20 MG/ML
5 INJECTION INTRAMUSCULAR; INTRAVENOUS EVERY 30 MIN PRN
Status: DISCONTINUED | OUTPATIENT
Start: 2025-03-06 | End: 2025-03-06 | Stop reason: HOSPADM

## 2025-03-06 RX ORDER — DULOXETIN HYDROCHLORIDE 30 MG/1
30 CAPSULE, DELAYED RELEASE ORAL DAILY
Status: DISCONTINUED | OUTPATIENT
Start: 2025-03-07 | End: 2025-03-07 | Stop reason: HOSPADM

## 2025-03-06 RX ORDER — AMOXICILLIN 250 MG
2 CAPSULE ORAL 2 TIMES DAILY
Status: DISCONTINUED | OUTPATIENT
Start: 2025-03-06 | End: 2025-03-07 | Stop reason: HOSPADM

## 2025-03-06 RX ORDER — PHENYLEPHRINE 10 MG/250 ML(40 MCG/ML)IN 0.9 % SOD.CHLORIDE INTRAVENOUS
CONTINUOUS PRN
Status: DISCONTINUED | OUTPATIENT
Start: 2025-03-06 | End: 2025-03-06

## 2025-03-06 RX ORDER — GLYCOPYRROLATE 0.2 MG/ML
INJECTION INTRAMUSCULAR; INTRAVENOUS AS NEEDED
Status: DISCONTINUED | OUTPATIENT
Start: 2025-03-06 | End: 2025-03-06

## 2025-03-06 RX ORDER — CEFAZOLIN SODIUM 2 G/100ML
INJECTION, SOLUTION INTRAVENOUS AS NEEDED
Status: DISCONTINUED | OUTPATIENT
Start: 2025-03-06 | End: 2025-03-06

## 2025-03-06 RX ORDER — METOCLOPRAMIDE 10 MG/1
10 TABLET ORAL EVERY 6 HOURS PRN
Status: DISCONTINUED | OUTPATIENT
Start: 2025-03-06 | End: 2025-03-07 | Stop reason: HOSPADM

## 2025-03-06 RX ORDER — OXYCODONE HYDROCHLORIDE 5 MG/1
5 TABLET ORAL EVERY 4 HOURS PRN
Status: DISCONTINUED | OUTPATIENT
Start: 2025-03-06 | End: 2025-03-07 | Stop reason: HOSPADM

## 2025-03-06 RX ORDER — ONDANSETRON HYDROCHLORIDE 2 MG/ML
INJECTION, SOLUTION INTRAVENOUS AS NEEDED
Status: DISCONTINUED | OUTPATIENT
Start: 2025-03-06 | End: 2025-03-06

## 2025-03-06 RX ORDER — LIDOCAINE HYDROCHLORIDE AND EPINEPHRINE 10; 10 UG/ML; MG/ML
INJECTION, SOLUTION INFILTRATION; PERINEURAL AS NEEDED
Status: DISCONTINUED | OUTPATIENT
Start: 2025-03-06 | End: 2025-03-06 | Stop reason: HOSPADM

## 2025-03-06 RX ORDER — ALBUTEROL SULFATE 0.83 MG/ML
2.5 SOLUTION RESPIRATORY (INHALATION)
Status: DISCONTINUED | OUTPATIENT
Start: 2025-03-06 | End: 2025-03-06 | Stop reason: HOSPADM

## 2025-03-06 RX ADMIN — ACETAMINOPHEN 975 MG: 325 TABLET ORAL at 06:29

## 2025-03-06 RX ADMIN — PROPOFOL 200 MG: 10 INJECTION, EMULSION INTRAVENOUS at 07:42

## 2025-03-06 RX ADMIN — SODIUM CHLORIDE, POTASSIUM CHLORIDE, SODIUM LACTATE AND CALCIUM CHLORIDE: 600; 310; 30; 20 INJECTION, SOLUTION INTRAVENOUS at 07:38

## 2025-03-06 RX ADMIN — MIDAZOLAM 2 MG: 1 INJECTION INTRAMUSCULAR; INTRAVENOUS at 07:38

## 2025-03-06 RX ADMIN — POVIDONE-IODINE 1 APPLICATION: 5 SOLUTION TOPICAL at 06:29

## 2025-03-06 RX ADMIN — Medication 0.2 MCG/KG/MIN: at 07:50

## 2025-03-06 RX ADMIN — HYDROMORPHONE HYDROCHLORIDE 0.5 MG: 1 INJECTION, SOLUTION INTRAMUSCULAR; INTRAVENOUS; SUBCUTANEOUS at 09:39

## 2025-03-06 RX ADMIN — SODIUM CHLORIDE, POTASSIUM CHLORIDE, SODIUM LACTATE AND CALCIUM CHLORIDE 100 ML/HR: 600; 310; 30; 20 INJECTION, SOLUTION INTRAVENOUS at 17:02

## 2025-03-06 RX ADMIN — SENNOSIDES AND DOCUSATE SODIUM 2 TABLET: 50; 8.6 TABLET ORAL at 12:49

## 2025-03-06 RX ADMIN — ROSUVASTATIN CALCIUM 10 MG: 10 TABLET, FILM COATED ORAL at 22:29

## 2025-03-06 RX ADMIN — HYDROMORPHONE HYDROCHLORIDE 0.5 MG: 1 INJECTION, SOLUTION INTRAMUSCULAR; INTRAVENOUS; SUBCUTANEOUS at 10:14

## 2025-03-06 RX ADMIN — FENTANYL CITRATE 50 MCG: 50 INJECTION, SOLUTION INTRAMUSCULAR; INTRAVENOUS at 08:21

## 2025-03-06 RX ADMIN — GABAPENTIN 300 MG: 300 CAPSULE ORAL at 22:29

## 2025-03-06 RX ADMIN — ACETAMINOPHEN 650 MG: 325 TABLET ORAL at 11:59

## 2025-03-06 RX ADMIN — ACETAMINOPHEN 650 MG: 325 TABLET ORAL at 18:45

## 2025-03-06 RX ADMIN — SODIUM CHLORIDE: 9 INJECTION, SOLUTION INTRAVENOUS at 07:38

## 2025-03-06 RX ADMIN — SUGAMMADEX 200 MG: 100 INJECTION, SOLUTION INTRAVENOUS at 09:48

## 2025-03-06 RX ADMIN — Medication 20 MG: at 08:52

## 2025-03-06 RX ADMIN — TRANEXAMIC ACID 1300 MG: 650 TABLET ORAL at 06:29

## 2025-03-06 RX ADMIN — Medication 10 MG: at 09:22

## 2025-03-06 RX ADMIN — Medication 50 MG: at 07:42

## 2025-03-06 RX ADMIN — OXYCODONE HYDROCHLORIDE AND ACETAMINOPHEN 1000 MG: 500 TABLET ORAL at 12:49

## 2025-03-06 RX ADMIN — LIDOCAINE HYDROCHLORIDE 100 MG: 20 INJECTION, SOLUTION EPIDURAL; INFILTRATION; INTRACAUDAL; PERINEURAL at 07:42

## 2025-03-06 RX ADMIN — CHOLECALCIFEROL TAB 125 MCG (5000 UNIT) 5000 UNITS: 125 TAB at 12:49

## 2025-03-06 RX ADMIN — POLYETHYLENE GLYCOL 3350 17 G: 17 POWDER, FOR SOLUTION ORAL at 12:50

## 2025-03-06 RX ADMIN — Medication 20 MG: at 08:14

## 2025-03-06 RX ADMIN — DEXAMETHASONE SODIUM PHOSPHATE 10 MG: 10 INJECTION, SOLUTION INTRAMUSCULAR; INTRAVENOUS at 08:00

## 2025-03-06 RX ADMIN — SENNOSIDES AND DOCUSATE SODIUM 2 TABLET: 50; 8.6 TABLET ORAL at 22:28

## 2025-03-06 RX ADMIN — OXYCODONE HYDROCHLORIDE 5 MG: 5 TABLET ORAL at 18:46

## 2025-03-06 RX ADMIN — OXYCODONE HYDROCHLORIDE 10 MG: 10 TABLET ORAL at 12:00

## 2025-03-06 RX ADMIN — ONDANSETRON 4 MG: 2 INJECTION INTRAMUSCULAR; INTRAVENOUS at 09:45

## 2025-03-06 RX ADMIN — CELECOXIB 400 MG: 200 CAPSULE ORAL at 06:29

## 2025-03-06 RX ADMIN — PROPOFOL 50 MCG/KG/MIN: 10 INJECTION, EMULSION INTRAVENOUS at 08:07

## 2025-03-06 RX ADMIN — CEFAZOLIN SODIUM 2 G: 2 INJECTION, SOLUTION INTRAVENOUS at 08:00

## 2025-03-06 RX ADMIN — FENTANYL CITRATE 50 MCG: 50 INJECTION, SOLUTION INTRAMUSCULAR; INTRAVENOUS at 07:42

## 2025-03-06 RX ADMIN — PSYLLIUM HUSK 1 PACKET: 3.4 POWDER ORAL at 12:49

## 2025-03-06 RX ADMIN — MAGNESIUM SULFATE HEPTAHYDRATE 2 G: 500 INJECTION, SOLUTION INTRAMUSCULAR; INTRAVENOUS at 08:05

## 2025-03-06 RX ADMIN — GLYCOPYRROLATE 0.2 MG: 0.2 INJECTION, SOLUTION INTRAMUSCULAR; INTRAVENOUS at 08:05

## 2025-03-06 SDOH — SOCIAL STABILITY: SOCIAL INSECURITY: ARE YOU OR HAVE YOU BEEN THREATENED OR ABUSED PHYSICALLY, EMOTIONALLY, OR SEXUALLY BY ANYONE?: NO

## 2025-03-06 SDOH — SOCIAL STABILITY: SOCIAL INSECURITY
WITHIN THE LAST YEAR, HAVE YOU BEEN RAPED OR FORCED TO HAVE ANY KIND OF SEXUAL ACTIVITY BY YOUR PARTNER OR EX-PARTNER?: NO

## 2025-03-06 SDOH — SOCIAL STABILITY: SOCIAL INSECURITY: ABUSE: ADULT

## 2025-03-06 SDOH — SOCIAL STABILITY: SOCIAL INSECURITY: HAVE YOU HAD THOUGHTS OF HARMING ANYONE ELSE?: NO

## 2025-03-06 SDOH — HEALTH STABILITY: MENTAL HEALTH: CURRENT SMOKER: 0

## 2025-03-06 SDOH — SOCIAL STABILITY: SOCIAL INSECURITY: HAS ANYONE EVER THREATENED TO HURT YOUR FAMILY OR YOUR PETS?: NO

## 2025-03-06 SDOH — SOCIAL STABILITY: SOCIAL INSECURITY: DO YOU FEEL UNSAFE GOING BACK TO THE PLACE WHERE YOU ARE LIVING?: NO

## 2025-03-06 SDOH — SOCIAL STABILITY: SOCIAL INSECURITY: WITHIN THE LAST YEAR, HAVE YOU BEEN AFRAID OF YOUR PARTNER OR EX-PARTNER?: NO

## 2025-03-06 SDOH — SOCIAL STABILITY: SOCIAL INSECURITY: WERE YOU ABLE TO COMPLETE ALL THE BEHAVIORAL HEALTH SCREENINGS?: YES

## 2025-03-06 SDOH — SOCIAL STABILITY: SOCIAL INSECURITY: HAVE YOU HAD ANY THOUGHTS OF HARMING ANYONE ELSE?: NO

## 2025-03-06 SDOH — SOCIAL STABILITY: SOCIAL INSECURITY: DOES ANYONE TRY TO KEEP YOU FROM HAVING/CONTACTING OTHER FRIENDS OR DOING THINGS OUTSIDE YOUR HOME?: NO

## 2025-03-06 SDOH — SOCIAL STABILITY: SOCIAL INSECURITY: DO YOU FEEL ANYONE HAS EXPLOITED OR TAKEN ADVANTAGE OF YOU FINANCIALLY OR OF YOUR PERSONAL PROPERTY?: NO

## 2025-03-06 SDOH — ECONOMIC STABILITY: FOOD INSECURITY
WITHIN THE PAST 12 MONTHS, YOU WORRIED THAT YOUR FOOD WOULD RUN OUT BEFORE YOU GOT THE MONEY TO BUY MORE.: PATIENT DECLINED

## 2025-03-06 SDOH — SOCIAL STABILITY: SOCIAL INSECURITY: WITHIN THE LAST YEAR, HAVE YOU BEEN HUMILIATED OR EMOTIONALLY ABUSED IN OTHER WAYS BY YOUR PARTNER OR EX-PARTNER?: NO

## 2025-03-06 SDOH — SOCIAL STABILITY: SOCIAL INSECURITY
WITHIN THE LAST YEAR, HAVE YOU BEEN KICKED, HIT, SLAPPED, OR OTHERWISE PHYSICALLY HURT BY YOUR PARTNER OR EX-PARTNER?: NO

## 2025-03-06 SDOH — SOCIAL STABILITY: SOCIAL INSECURITY: ARE THERE ANY APPARENT SIGNS OF INJURIES/BEHAVIORS THAT COULD BE RELATED TO ABUSE/NEGLECT?: NO

## 2025-03-06 SDOH — ECONOMIC STABILITY: INCOME INSECURITY
IN THE PAST 12 MONTHS HAS THE ELECTRIC, GAS, OIL, OR WATER COMPANY THREATENED TO SHUT OFF SERVICES IN YOUR HOME?: PATIENT DECLINED

## 2025-03-06 SDOH — ECONOMIC STABILITY: HOUSING INSECURITY: DO YOU FEEL UNSAFE GOING BACK TO THE PLACE WHERE YOU LIVE?: NO

## 2025-03-06 SDOH — ECONOMIC STABILITY: FOOD INSECURITY: WITHIN THE PAST 12 MONTHS, THE FOOD YOU BOUGHT JUST DIDN'T LAST AND YOU DIDN'T HAVE MONEY TO GET MORE.: PATIENT DECLINED

## 2025-03-06 ASSESSMENT — COLUMBIA-SUICIDE SEVERITY RATING SCALE - C-SSRS
2. HAVE YOU ACTUALLY HAD ANY THOUGHTS OF KILLING YOURSELF?: NO
1. IN THE PAST MONTH, HAVE YOU WISHED YOU WERE DEAD OR WISHED YOU COULD GO TO SLEEP AND NOT WAKE UP?: NO
6. HAVE YOU EVER DONE ANYTHING, STARTED TO DO ANYTHING, OR PREPARED TO DO ANYTHING TO END YOUR LIFE?: NO

## 2025-03-06 ASSESSMENT — PAIN SCALES - GENERAL
PAINLEVEL_OUTOF10: 5 - MODERATE PAIN
PAINLEVEL_OUTOF10: 3
PAINLEVEL_OUTOF10: 7
PAINLEVEL_OUTOF10: 3
PAINLEVEL_OUTOF10: 3
PAINLEVEL_OUTOF10: 0 - NO PAIN

## 2025-03-06 ASSESSMENT — PAIN - FUNCTIONAL ASSESSMENT
PAIN_FUNCTIONAL_ASSESSMENT: 0-10

## 2025-03-06 ASSESSMENT — ACTIVITIES OF DAILY LIVING (ADL)
DRESSING YOURSELF: INDEPENDENT
LACK_OF_TRANSPORTATION: PATIENT DECLINED
PATIENT'S MEMORY ADEQUATE TO SAFELY COMPLETE DAILY ACTIVITIES?: YES
BATHING: INDEPENDENT
HEARING - RIGHT EAR: DIFFICULTY WITH NOISE
ADEQUATE_TO_COMPLETE_ADL: YES
GROOMING: INDEPENDENT
TOILETING: INDEPENDENT
FEEDING YOURSELF: INDEPENDENT
JUDGMENT_ADEQUATE_SAFELY_COMPLETE_DAILY_ACTIVITIES: YES
ASSISTIVE_DEVICE: EYEGLASSES
WALKS IN HOME: INDEPENDENT
HEARING - LEFT EAR: DIFFICULTY WITH NOISE

## 2025-03-06 ASSESSMENT — COGNITIVE AND FUNCTIONAL STATUS - GENERAL
TURNING FROM BACK TO SIDE WHILE IN FLAT BAD: A LITTLE
HELP NEEDED FOR BATHING: A LITTLE
DRESSING REGULAR UPPER BODY CLOTHING: A LITTLE
STANDING UP FROM CHAIR USING ARMS: A LITTLE
DAILY ACTIVITIY SCORE: 24
MOVING TO AND FROM BED TO CHAIR: A LITTLE
MOVING FROM LYING ON BACK TO SITTING ON SIDE OF FLAT BED WITH BEDRAILS: A LITTLE
PATIENT BASELINE BEDBOUND: NO
WALKING IN HOSPITAL ROOM: A LITTLE
MOBILITY SCORE: 24
PERSONAL GROOMING: A LITTLE
MOBILITY SCORE: 18
DRESSING REGULAR LOWER BODY CLOTHING: A LITTLE
TOILETING: A LITTLE
DAILY ACTIVITIY SCORE: 18
CLIMB 3 TO 5 STEPS WITH RAILING: A LITTLE
EATING MEALS: A LITTLE

## 2025-03-06 ASSESSMENT — PATIENT HEALTH QUESTIONNAIRE - PHQ9
SUM OF ALL RESPONSES TO PHQ9 QUESTIONS 1 & 2: 0
2. FEELING DOWN, DEPRESSED OR HOPELESS: NOT AT ALL
1. LITTLE INTEREST OR PLEASURE IN DOING THINGS: NOT AT ALL
1. LITTLE INTEREST OR PLEASURE IN DOING THINGS: NOT AT ALL

## 2025-03-06 ASSESSMENT — PAIN DESCRIPTION - DESCRIPTORS
DESCRIPTORS: ACHING;SORE
DESCRIPTORS: ACHING;SORE

## 2025-03-06 ASSESSMENT — LIFESTYLE VARIABLES
HOW OFTEN DO YOU HAVE 6 OR MORE DRINKS ON ONE OCCASION: NEVER
SKIP TO QUESTIONS 9-10: 1
HOW OFTEN DO YOU HAVE A DRINK CONTAINING ALCOHOL: MONTHLY OR LESS
AUDIT-C TOTAL SCORE: 1
HOW MANY STANDARD DRINKS CONTAINING ALCOHOL DO YOU HAVE ON A TYPICAL DAY: 1 OR 2
AUDIT-C TOTAL SCORE: 1

## 2025-03-06 NOTE — DISCHARGE INSTR - ACTIVITY
Call Dr. Donahue for any problems and/or concerns.  *Maintain spine precautions  *Log roll as instructed  *Walk as much as possible  *Avoid pushing, pulling, bending, twisting, and sitting/laying down in the same position for long periods of time  *No baths, hot tubs, or pools until cleared by physician; no lotions, creams, ointments over incision  *You may shower, do not soak or scrub incision; pat dry  *Continue the coughing and deep breathing exercises that you learned in the hospital  *Do not engage in sports, heavy work or lifting  *If you have a brace/collar-wear as instructed by physician and/or therapy, keep the brace/collar clean and dry, check the skin around the brace/collar every day and notify your physician of any concerns    Call Doctor right away for:  *Fever above 100.4/shaking chills  *New pain, weakness, warmth, or numbness in your legs  *Foot, ankle, or calf swelling that is not relieved by elevating your feet  *Inability to urinate/move bowels  *A severe headache  *Chest pain/shortness of breath-call 911  *Difficulty swallowing (cervical surgery)    If your doctor has ordered compression stockings, wear as directed as they help to prevent blood clots and reduce swelling in your legs    Do not use any products that contain nicotine or tobacco, such as cigarettes, e-cigarettes, and chewing tobacco. These can delay bone healing after surgery. If you need help quitting, ask your healthcare provider    -No heavy lifting or straining until patient is seen in the office.  -Encourage ambulation at least 3 times a day.  -You must be accompanied by a responsible adult upon discharge and for 24 hours after surgery.  Do not drive a motor vehicle, operate machinery, power tools or appliances, drink alcoholic beverages, or make critical decisions for 24 hours and while on narcotic pain meds.  -Be aware of dizziness, which may cause a fall.  Change positions slowly.  -You may resume your regular diet, but do so  slowly.  -Use your pain medication prescription as prescribed by your physician.  If possible, take your medication with food, and drink plenty of fluids.  -Call your surgeon if you have questions, temperature of 100.4° or greater, increased bleeding swelling or pain, drainage from the incision or increased redness around the incision.

## 2025-03-06 NOTE — ANESTHESIA PROCEDURE NOTES
Airway  Date/Time: 3/6/2025 7:45 AM  Urgency: elective    Airway not difficult    Staffing  Performed: EZEKIEL   Authorized by: Oni Lyles MD    Performed by: EZEKIEL Ashton  Patient location during procedure: OR    Indications and Patient Condition  Indications for airway management: anesthesia  Spontaneous ventilation: present  Sedation level: deep  Preoxygenated: yes  Patient position: sniffing  Mask difficulty assessment: 1 - vent by mask    Final Airway Details  Final airway type: endotracheal airway      Successful airway: ETT     Successful intubation technique: video laryngoscopy (davidson 4)  Facilitating devices/methods: intubating stylet  Blade: Michael  Blade size: #4  ETT size (mm): 7.5  Cormack-Lehane Classification: grade IIa - partial view of glottis  Placement verified by: chest auscultation and capnometry   Measured from: lips  ETT to lips (cm): 24  Number of attempts at approach: 1

## 2025-03-06 NOTE — ANESTHESIA PREPROCEDURE EVALUATION
"Patient: Sivakumar Hall \"Deshaun\"    Procedure Information       Anesthesia Start Date/Time: 03/06/25 0738    Procedures:       C5-C6 Redo Anterior Cervical Discectomy and Fusion, extension of plate C5-C7 - 92283, 93197      LARYNGOSCOPY    Location: [facility] OR 09 / [facility]    Surgeons: Moshe Donahue MD            Relevant Problems   Cardiac   (+) Hyperlipidemia      Neuro   (+) Cervical neuropathy   (+) Cervical radiculopathy   (+) Cervical radiculopathy at C6   (+) Depression with anxiety      Musculoskeletal   (+) Chronic midline low back pain without sciatica   (+) Displacement of lumbar intervertebral disc without myelopathy   (+) Herniated lumbar intervertebral disc   (+) Spinal stenosis of cervical region   (+) Spondylosis of cervical region without myelopathy or radiculopathy      HEENT   (+) Acute sinusitis       Clinical information reviewed:   Tobacco  Allergies  Meds   Med Hx  Surg Hx   Fam Hx  Soc Hx        NPO Detail:  NPO/Void Status  Carbohydrate Drink Given Prior to Surgery? : N  Date of Last Liquid: 03/06/25  Time of Last Liquid: 0300 (water)  Date of Last Solid: 03/05/25  Time of Last Solid: 1900  Last Intake Type: Clear fluids  Time of Last Void: 0615         Physical Exam    Airway  Mallampati: III  TM distance: >3 FB  Neck ROM: limited     Cardiovascular   Rhythm: regular  Rate: normal     Dental - normal exam     Pulmonary   Breath sounds clear to auscultation     Abdominal - normal exam             Anesthesia Plan    History of general anesthesia?: yes  History of complications of general anesthesia?: no    ASA 2     general     The patient is not a current smoker.    intravenous induction   Anesthetic plan and risks discussed with patient.    Plan discussed with CAA.      "

## 2025-03-06 NOTE — OP NOTE
"C5-C6 Redo Anterior Cervical Discectomy and Fusion, extension of plate C5-C7, LARYNGOSCOPY Operative Note     Date: 3/6/2025  OR [facility]: [facility]    Name: Sivakumar Hall \"Lizette", : 1965, Age: 59 y.o., MRN: [id number], Sex: male    Diagnosis  Pre-op Diagnosis      * Cervical radiculopathy [M54.12] Post-op Diagnosis     * Cervical radiculopathy [M54.12]     Procedures  C5-C6 Redo Anterior Cervical Discectomy and Fusion, extension of plate C5-C7  89409 - [place] ARTHRD ANT INTERBODY DECOMPRESS CERVICAL BELW C2-62 modifier    LARYNGOSCOPY  66447 - [place] LARYNGOSCOPY FLEXIBLE DIAGNOSTIC    Surgeons   Too Lazcano MD     Resident/Fellow/Other Assistant:  Surgeons and Role:          * Skip Tineo MD - Assisting    Staff:   Surgical Assistant:   Circulator: Jamar  Scrub Person: Minal  Scrub Person: Guillermina Ulrichub Person: Sharla    Anesthesia Staff: Anesthesiologist: Oni Lyles MD  C-AA: EZEKIEL Ashton    Procedure Summary  Anesthesia: Anesthesia type not filed in the log.  ASA: ASA status not filed in the log.  Estimated Blood Loss: 2 mL  Intra-op Medications:   Administrations occurring from 0730 to 1130 on 25:   Medication Name Total Dose   lidocaine-epinephrine (Xylocaine W/EPI) 1 %-1:100,000 injection 7 mL   ceFAZolin (Ancef) IV 2 g in 100 mL dextrose (iso) - premix 2 g   dexAMETHasone (Decadron) PF injection 10 mg/mL 10 mg   fentaNYL (Sublimaze) injection 50 mcg/mL 100 mcg   glycopyrrolate (Robinul) injection 0.2 mg   LR infusion Cannot be calculated   lidocaine PF (Xylocaine-MPF) local injection 2 % 100 mg   magnesium sulfate 50 % injection 2 g   midazolam (Versed) injection 1 mg/mL 2 mg   phenylephrine (Wayne-Synephrine) 10 mg/250 mL NS (40 mcg/mL) infusion 3.77 mg   propofol (Diprivan) injection 10 mg/mL 1,120.61 mg   rocuronium (Zemuron) 50 mg/5 mL prefilled syringe 70 mg   NaCl 0.9 % bolus Cannot be calculated              Anesthesia Record               Intraprocedure I/O Totals          Intake " "   Phenylephrine Drip 0.00 mL    The total shown is the total volume documented since Anesthesia Start was filed.    ceFAZolin 2 gram/100 mL 100.00 mL    Total Intake 100 mL          Specimen: No specimens collected           Findings: Right true vocal cord weakness preoperatively.  Successful exposure of C5-C7    Indications: Sivakumar Hall \"Lizette" is an 59 y.o. male who is having surgery for Cervical radiculopathy [M54.12].     The patient was seen in the preoperative area. The risks, benefits, complications, treatment options, non-operative alternatives, expected recovery and outcomes were discussed with the patient. The possibilities of reaction to medication, pulmonary aspiration, injury to surrounding structures, bleeding, recurrent infection, the need for additional procedures, failure to diagnose a condition, and creating a complication requiring transfusion or operation were discussed with the patient. The patient concurred with the proposed plan, giving informed consent.  The site of surgery was properly noted/marked if necessary per policy. The patient has been actively warmed in preoperative area. Preoperative antibiotics have been ordered and given within 1 hours of incision. Venous thrombosis prophylaxis have been ordered including bilateral sequential compression devices    Procedure Details: Patient was seen in the preoperative holding area.  Patient has remote history of ACDF and postoperatively had issues with his voice.  It improved over time however he still does have some difficulty with swallowing.  Due to this history of flexible laryngoscopy was performed in the preoperative area.  Topical lidocaine was applied to the nasal cavity bilaterally.  A flexible scope was used to examine the larynx showing a hypomobile right sided true vocal cord with medialization.  He had good glottic closure with phonation.  There were no mucosal lesions    He was then taken back to the operating room laid supine " on the table.  Timeout was performed, general anesthesia induced, patient was intubated.  The patient was prepped and draped by the neurosurgical team.  We used his prior incision for access.  A 15 blade was used to make skin incision down into the subcutaneous fat.  A subplatysmal flap was elevated superiorly.  Next the lateral border of the strap muscles was identified and a plane was developed between the strap muscles and the carotid sheath at the level of the thyroid cartilage.  This allowed access to the prevertebral space above the level of the plate.  Next blunt dissection was carried inferiorly until the top of the plate was identified.  The middle thyroid vein was clipped and divided.  I continued blunt dissection to fully expose the lower aspect of the plate as well as 1 level above.  Proper placement was confirmed with the neurosurgical team.  The patient was then turned over to neurosurgery for the remainder of the case.  Complications:  None; patient tolerated the procedure well.    Disposition:  Remaining in operating room for additional procedures  Condition: stable     Too Lazcano MD

## 2025-03-06 NOTE — ANESTHESIA POSTPROCEDURE EVALUATION
"Patient: Sivakumar Hall \"Deshaun\"    Procedure Summary       Date: 03/06/25 Room / Location: [facility] / [facility]    Anesthesia Start: 0738 Anesthesia Stop: 1016    Procedures:       C5-C6 Redo Anterior Cervical Discectomy and Fusion, extension of plate C5-C7      LARYNGOSCOPY Diagnosis:       Cervical radiculopathy      (Cervical radiculopathy [M54.12])    Surgeons: Moshe Donahue MD Responsible Provider: Oni Lyles MD    Anesthesia Type: general ASA Status: 2            Anesthesia Type: general    Vitals Value Taken Time   /73 03/06/25 1100   Temp 36.3 °C (97.3 °F) 03/06/25 1100   Pulse 78 03/06/25 1102   Resp 18 03/06/25 1102   SpO2 97 % 03/06/25 1102   Vitals shown include unfiled device data.    Anesthesia Post Evaluation    Patient location during evaluation: PACU  Patient participation: complete - patient participated  Level of consciousness: sleepy but conscious  Pain management: satisfactory to patient  Airway patency: patent  Cardiovascular status: hemodynamically stable  Respiratory status: spontaneous ventilation  Hydration status: euvolemic  Postoperative Nausea and Vomiting: none        No notable events documented.    "

## 2025-03-06 NOTE — OP NOTE
"C5-C6 Redo Anterior Cervical Discectomy and Fusion, extension of plate C5-C7, LARYNGOSCOPY Operative Note     Date: 3/6/2025  OR Location: [facility]    Name: Sivakumar Hall \"Lizette", : 1965, Age: 59 y.o., MRN: [id number], Sex: male    Diagnosis  Pre-op Diagnosis      * Cervical radiculopathy [M54.12] Post-op Diagnosis     * Cervical radiculopathy [M54.12]     Procedures  C5-C6 Redo Anterior Cervical Discectomy and Fusion, extension of plate C5-C7  80870 - MI ARTHRD ANT INTERBODY DECOMPRESS CERVICAL BELW C2    LARYNGOSCOPY  10810 - MI LARYNGOSCOPY FLEXIBLE DIAGNOSTIC    MI ANTERIOR INSTRUMENTATION 2-3 VERTEBRAL SEGMENTS [62804]  MI ALLOGRAFT FOR SPINE SURGERY ONLY STRUCTURAL []  Surgeons      * Moshe Donahue - Primary    Resident/Fellow/Other Assistant:  Surgeons and Role:     * Too Lazcano MD - Assisting     * Skip Tineo MD - Assisting    Staff:   Surgical Assistant:   Circulator: Jamar  Scrub Person: Minal  Scrub Person: Guillermina Ulrichub Person: Sharla    Anesthesia Staff: Anesthesiologist: Oni Lyles MD  C-AA: EZEKIEL Ashton    Procedure Summary  Anesthesia: General  ASA: II  Estimated Blood Loss: 50mL  Intra-op Medications:   Administrations occurring from 0730 to 1130 on 25:   Medication Name Total Dose   lidocaine-epinephrine (Xylocaine W/EPI) 1 %-1:100,000 injection 7 mL   ceFAZolin (Ancef) IV 2 g in 100 mL dextrose (iso) - premix 2 g   dexAMETHasone (Decadron) PF injection 10 mg/mL 10 mg   fentaNYL (Sublimaze) injection 50 mcg/mL 100 mcg   glycopyrrolate (Robinul) injection 0.2 mg   HYDROmorphone (Dilaudid) injection 1 mg/mL 0.5 mg   LR infusion Cannot be calculated   lidocaine PF (Xylocaine-MPF) local injection 2 % 100 mg   magnesium sulfate 50 % injection 2 g   midazolam (Versed) injection 1 mg/mL 2 mg   ondansetron (Zofran) 2 mg/mL injection 4 mg   phenylephrine (Wanye-Synephrine) 10 mg/250 mL NS (40 mcg/mL) infusion 2.16 mg   propofol (Diprivan) injection 10 mg/mL 753.27 mg "   rocuronium (Zemuron) 50 mg/5 mL prefilled syringe 100 mg   NaCl 0.9 % bolus Cannot be calculated   sugammadex (Bridion) 200 mg/2 mL injection 200 mg              Anesthesia Record               Intraprocedure I/O Totals          Intake    Phenylephrine Drip 0.00 mL    The total shown is the total volume documented since Anesthesia Start was filed.    ceFAZolin 2 gram/100 mL 100.00 mL    Total Intake 100 mL          Specimen: No specimens collected              Drains and/or Catheters: * None in log *    Implants:  Implants       Type Name Action Serial No.       triad cc allograft 7 Wasted [id number]      triad cc allograft 7 mm Implanted [id number]      1.6 x 36 mm plate Implanted      Spinal Hardware SCREW, ACP, SELF DRILL, 3.5 X 17MM, VARIABLE - [id number] Implanted                     Informed Consent:  The risks, benefits, complications, and alternatives were discussed with the patient. I have explained the surgical procedure in detail with expected duration and extent of recovery along risks of surgery that include, but is not limited to bleeding, infection, blood vessel injury or damage, loss of sensation, loss of bladder, bowel or sexual function, nerve injury/damage resulting in weakness/paralysis, malunion, nonunion, CSF leak, brachial plexus injury, peripheral vision blindness, failure of implants/fusion, failure to relieve symptoms, recurrent disease, adjacent segment disease, need to reoperate for any reason and general anesthesia reaction such as stroke, coma, heart attack, delirium, confusion, death as well as worsening of preexisted medical conditions.     I clearly emphasized that while the goal of surgery is to decompress the spinal cord so as to ARREST the progression of neurological deficits - preexisting deficits may or may not improve after surgery. We discussed that many patients do clinically improve in functional and neurological outcomes following decompression of the spinal elements in  "patients but the extent of which is variable and depends on the severity of pain, numbness, tingling, or weakness. With improvement seen of those symptoms in that order. We did discuss the goal of surgery to alleviate pain first and foremost and hope for recovery of all neurologic function with time.     All questions were answered and the patient was amenable to proceed.     INDICATIONS FOR THE PROCEDURE: Sivakumar Hall \"Lizette" is an 59 y.o. male who is having surgery for Cervical radiculopathy [M54.12].     DESCRIPTION OF THE OPERATION:   The patient was brought to the operating room theater. A verbal Huddle was performed confirming the patient by name, date of birth, medical record number, site of surgery. After all team members were in agreement, they underwent anesthesia induction without complication. Two large bore IVs were placed as well as endotracheal tube. Perioperative antibiotic administration was confirmed. A horizontal incision from the midline of the trachea over to the medial aspect of the sternocleidomastoid was marked at approximately the C5-6, C6-7 disc spaces and this was confirmed with lateral fluoroscopy. Next, Dr. Lazcano prepped and draped the neck in a sterile fashion and infiltrated the skin with lidocaine with epinephrine. Dr. Lazcano exposed the anterior aspect of the spine without complications, please see his op note for this exposure. Upon our entry the old plate was exposed and this was removed without complications across the C6-7 disc space. We then turned our attention to C5-6.    We then placed our self-retaining retractor in and [place] pins in and placed the disc space under distraction. We then performed an annulotomy with an 11 blade followed by Kerrison rongeurs and curettes. We performed a complete total discectomy under microscopic assistance. After completion of the discectomy, we removed the posterior longitudinal ligament using microsurgical technique with a nerve hook, " curettes, and kerrison rongeurs. We then burred out the uncinate processes bilaterally and performed foraminotomies with a Kerrison rongeur. This was confirmed with a large blunt nerve hook bilaterally across C5-6.    FloSeal and bipolar cautery were next used to achieve hemostasis. Contouring and arthrodesis of the disc space was then finally performed with a high-speed drill. Anterior osteophytes were removed, and a trial was then inserted in the disc space and a 7mm allograft interbody was placed without complication under fluoroscopic guidance.      We then used an anterior plate and secured this with 17mm screws to fixate the spine in place. This was final tightened and secured to the vertebral bodies. Copious amounts of irrigation were used and FloSeal and Bipolar caudery for hemostasis. Final x-rays confirmed accurate placement of all hardware across the C5-6, C6-7 disc spaces. The platysma and dermal layers were then approximated with 3-0 Vicryl sutures. Skin was approximated with a Biosyn stitch in a subcuticular fashion followed by Dermabond on the skin. The patient was then extubated and returned to PACU in stable condition.    Disposition: PACU - hemodynamically stable.    Condition: stable    Attending Attestation: I was present and scrubbed for the key portions of the procedure.      Moshe Donahue MD, [facility]  Spine , [facility]  Keyshawn Suresh and Ghazala Suresh Chair in Spinal Neurosurgery  Neurosurgery , [facility]  Complex Spine Surgery Fellowship Director   of Neurological Surgery  [facility] [employer]  Office: [phone]  Fax: [phone]

## 2025-03-06 NOTE — DISCHARGE INSTR - OTHER ORDERS
If you have any questions or concerns about your discharge instructions, please call the unit at  and ask to speak with the charge nurse. Thank you for choosing [facility]. It was our pleasure to care for you.

## 2025-03-07 ENCOUNTER — APPOINTMENT (OUTPATIENT)
Dept: RADIOLOGY | Facility: HOSPITAL | Age: 60
End: 2025-03-07
Payer: COMMERCIAL

## 2025-03-07 VITALS
OXYGEN SATURATION: 98 % | RESPIRATION RATE: 16 BRPM | HEART RATE: 72 BPM | BODY MASS INDEX: 33.32 KG/M2 | HEIGHT: 65 IN | TEMPERATURE: 97 F | SYSTOLIC BLOOD PRESSURE: 143 MMHG | WEIGHT: 200 LBS | DIASTOLIC BLOOD PRESSURE: 61 MMHG

## 2025-03-07 LAB
ANION GAP SERPL CALC-SCNC: 11 MMOL/L (ref 10–20)
BUN SERPL-MCNC: 14 MG/DL (ref 6–23)
CALCIUM SERPL-MCNC: 8.9 MG/DL (ref 8.6–10.3)
CHLORIDE SERPL-SCNC: 107 MMOL/L (ref 98–107)
CO2 SERPL-SCNC: 27 MMOL/L (ref 21–32)
CREAT SERPL-MCNC: 0.88 MG/DL (ref 0.5–1.3)
EGFRCR SERPLBLD CKD-EPI 2021: >90 ML/MIN/1.73M*2
ERYTHROCYTE [DISTWIDTH] IN BLOOD BY AUTOMATED COUNT: 12.1 % (ref 11.5–14.5)
GLUCOSE SERPL-MCNC: 117 MG/DL (ref 74–99)
HCT VFR BLD AUTO: 41.1 % (ref 41–52)
HGB BLD-MCNC: 13.3 G/DL (ref 13.5–17.5)
MCH RBC QN AUTO: 31.4 PG (ref 26–34)
MCHC RBC AUTO-ENTMCNC: 32.4 G/DL (ref 32–36)
MCV RBC AUTO: 97 FL (ref 80–100)
NRBC BLD-RTO: 0 /100 WBCS (ref 0–0)
PLATELET # BLD AUTO: 197 X10*3/UL (ref 150–450)
POTASSIUM SERPL-SCNC: 4.6 MMOL/L (ref 3.5–5.3)
RBC # BLD AUTO: 4.23 X10*6/UL (ref 4.5–5.9)
SODIUM SERPL-SCNC: 140 MMOL/L (ref 136–145)
WBC # BLD AUTO: 13.1 X10*3/UL (ref 4.4–11.3)

## 2025-03-07 PROCEDURE — 96372 THER/PROPH/DIAG INJ SC/IM: CPT | Performed by: STUDENT IN AN ORGANIZED HEALTH CARE EDUCATION/TRAINING PROGRAM

## 2025-03-07 PROCEDURE — 2500000001 HC RX 250 WO HCPCS SELF ADMINISTERED DRUGS (ALT 637 FOR MEDICARE OP): Performed by: STUDENT IN AN ORGANIZED HEALTH CARE EDUCATION/TRAINING PROGRAM

## 2025-03-07 PROCEDURE — 80048 BASIC METABOLIC PNL TOTAL CA: CPT | Performed by: STUDENT IN AN ORGANIZED HEALTH CARE EDUCATION/TRAINING PROGRAM

## 2025-03-07 PROCEDURE — 72040 X-RAY EXAM NECK SPINE 2-3 VW: CPT | Performed by: RADIOLOGY

## 2025-03-07 PROCEDURE — 7100000011 HC EXTENDED STAY RECOVERY HOURLY - NURSING UNIT

## 2025-03-07 PROCEDURE — 2500000004 HC RX 250 GENERAL PHARMACY W/ HCPCS (ALT 636 FOR OP/ED): Performed by: STUDENT IN AN ORGANIZED HEALTH CARE EDUCATION/TRAINING PROGRAM

## 2025-03-07 PROCEDURE — 97165 OT EVAL LOW COMPLEX 30 MIN: CPT | Mod: GO

## 2025-03-07 PROCEDURE — 85027 COMPLETE CBC AUTOMATED: CPT | Performed by: STUDENT IN AN ORGANIZED HEALTH CARE EDUCATION/TRAINING PROGRAM

## 2025-03-07 PROCEDURE — 72040 X-RAY EXAM NECK SPINE 2-3 VW: CPT

## 2025-03-07 PROCEDURE — 36415 COLL VENOUS BLD VENIPUNCTURE: CPT | Performed by: STUDENT IN AN ORGANIZED HEALTH CARE EDUCATION/TRAINING PROGRAM

## 2025-03-07 PROCEDURE — 97161 PT EVAL LOW COMPLEX 20 MIN: CPT | Mod: GP | Performed by: PHYSICAL THERAPIST

## 2025-03-07 RX ORDER — CYCLOBENZAPRINE HCL 10 MG
10 TABLET ORAL 3 TIMES DAILY PRN
Qty: 60 TABLET | Refills: 0 | Status: SHIPPED | OUTPATIENT
Start: 2025-03-07 | End: 2025-04-06

## 2025-03-07 RX ORDER — OXYCODONE HYDROCHLORIDE 5 MG/1
5 TABLET ORAL EVERY 6 HOURS PRN
Qty: 28 TABLET | Refills: 0 | Status: SHIPPED | OUTPATIENT
Start: 2025-03-07 | End: 2025-03-14

## 2025-03-07 RX ADMIN — OXYCODONE HYDROCHLORIDE 5 MG: 5 TABLET ORAL at 00:23

## 2025-03-07 RX ADMIN — ACETAMINOPHEN 650 MG: 325 TABLET ORAL at 00:23

## 2025-03-07 RX ADMIN — PSYLLIUM HUSK 1 PACKET: 3.4 POWDER ORAL at 08:33

## 2025-03-07 RX ADMIN — CHOLECALCIFEROL TAB 125 MCG (5000 UNIT) 5000 UNITS: 125 TAB at 08:32

## 2025-03-07 RX ADMIN — HEPARIN SODIUM 5000 UNITS: 5000 INJECTION, SOLUTION INTRAVENOUS; SUBCUTANEOUS at 00:23

## 2025-03-07 RX ADMIN — ACETAMINOPHEN 650 MG: 325 TABLET ORAL at 07:44

## 2025-03-07 RX ADMIN — ACETAMINOPHEN 650 MG: 325 TABLET ORAL at 12:44

## 2025-03-07 RX ADMIN — OXYCODONE HYDROCHLORIDE AND ACETAMINOPHEN 1000 MG: 500 TABLET ORAL at 08:32

## 2025-03-07 RX ADMIN — SODIUM CHLORIDE, POTASSIUM CHLORIDE, SODIUM LACTATE AND CALCIUM CHLORIDE 100 ML/HR: 600; 310; 30; 20 INJECTION, SOLUTION INTRAVENOUS at 02:55

## 2025-03-07 RX ADMIN — SENNOSIDES AND DOCUSATE SODIUM 2 TABLET: 50; 8.6 TABLET ORAL at 08:32

## 2025-03-07 RX ADMIN — POLYETHYLENE GLYCOL 3350 17 G: 17 POWDER, FOR SOLUTION ORAL at 08:33

## 2025-03-07 RX ADMIN — HEPARIN SODIUM 5000 UNITS: 5000 INJECTION, SOLUTION INTRAVENOUS; SUBCUTANEOUS at 07:44

## 2025-03-07 RX ADMIN — DULOXETINE HYDROCHLORIDE 30 MG: 30 CAPSULE, DELAYED RELEASE ORAL at 08:32

## 2025-03-07 ASSESSMENT — COGNITIVE AND FUNCTIONAL STATUS - GENERAL
DAILY ACTIVITIY SCORE: 24
MOBILITY SCORE: 24

## 2025-03-07 ASSESSMENT — PAIN - FUNCTIONAL ASSESSMENT
PAIN_FUNCTIONAL_ASSESSMENT: 0-10

## 2025-03-07 ASSESSMENT — PAIN SCALES - GENERAL
PAINLEVEL_OUTOF10: 3
PAINLEVEL_OUTOF10: 5 - MODERATE PAIN
PAINLEVEL_OUTOF10: 3
PAINLEVEL_OUTOF10: 3
PAINLEVEL_OUTOF10: 2
PAINLEVEL_OUTOF10: 4

## 2025-03-07 ASSESSMENT — ACTIVITIES OF DAILY LIVING (ADL)
ADL_ASSISTANCE: INDEPENDENT
ADL_ASSISTANCE: INDEPENDENT

## 2025-03-07 NOTE — CARE PLAN
Problem: Pain - Adult  Goal: Verbalizes/displays adequate comfort level or baseline comfort level  Outcome: Progressing     Problem: Safety - Adult  Goal: Free from fall injury  Outcome: Progressing     Problem: Discharge Planning  Goal: Discharge to home or other facility with appropriate resources  Outcome: Progressing     Problem: Chronic Conditions and Co-morbidities  Goal: Patient's chronic conditions and co-morbidity symptoms are monitored and maintained or improved  Outcome: Progressing     Problem: Nutrition  Goal: Nutrient intake appropriate for maintaining nutritional needs  Outcome: Progressing     Problem: Pain  Goal: Takes deep breaths with improved pain control throughout the shift  Outcome: Progressing  Goal: Turns in bed with improved pain control throughout the shift  Outcome: Progressing  Goal: Walks with improved pain control throughout the shift  Outcome: Progressing  Goal: Performs ADL's with improved pain control throughout shift  Outcome: Progressing  Goal: Participates in PT with improved pain control throughout the shift  Outcome: Progressing  Goal: Free from opioid side effects throughout the shift  Outcome: Progressing  Goal: Free from acute confusion related to pain meds throughout the shift  Outcome: Progressing   The patient's goals for the shift include      The clinical goals for the shift include Pt will have minimal pain this shift and receive adequate rest.    Over the shift, the patient did have minimal pain and received adequate rest.

## 2025-03-07 NOTE — DISCHARGE SUMMARY
Discharge Diagnosis  Cervical radiculopathy; S/P C5-C6 Redo ACDF, extension of plate C5-C7    Issues Requiring Follow-Up  Scheduled follow up in place    Test Results Pending At Discharge  Pending Labs       No current pending labs.            Hospital Course   Admitted following elective C5-C6 Redo ACDF, extension of plate C5-C7. Recovered well in PACU. No complications post-operatively. Swallowing without difficulty. Pain controlled. Voiding without difficulty. Ambulating without difficulty. Criteria for discharge met.     Pertinent Physical Exam At Time of Discharge  General: Well developed, awake/alert/oriented x3, no distress, alert and cooperative. Speech clear.   Skin: Warm and dry  ENMT: Mucous membranes moist  Head/Neck: Neck Supple  Respiratory/Thorax: Normal breath sounds with good chest expansion, thorax symmetric  Cardiovascular: No pitting edema    Motor Strength: 5/5 Throughout all extremities    Muscle Bulk: Normal and symmetric in all extremities    Sensation: intact to light touch    Anterior incision approximated with skin glue. No erythema/edema or drainage. Open to air.     Home Medications     Medication List      START taking these medications     cyclobenzaprine 10 mg tablet; Commonly known as: Flexeril; Take 1 tablet   (10 mg) by mouth 3 times a day as needed for muscle spasms.   oxyCODONE 5 mg immediate release tablet; Commonly known as: Roxicodone;   Take 1 tablet (5 mg) by mouth every 6 hours if needed for moderate pain (4   - 6) or severe pain (7 - 10) for up to 7 days.     CHANGE how you take these medications     lisdexamfetamine 20 mg capsule; Commonly known as: Vyvanse; Take 1   capsule (20 mg) by mouth once daily in the morning.; What changed: Another   medication with the same name was removed. Continue taking this   medication, and follow the directions you see here.     CONTINUE taking these medications     alpha lipoic acid 600 mg capsule   ascorbic acid 1,000 mg tablet;  Commonly known as: Vitamin C   cholecalciferol 5,000 Units tablet; Commonly known as: Vitamin D-3   DULoxetine 30 mg DR capsule; Commonly known as: Cymbalta; Take 1 capsule   (30 mg) by mouth once daily.   gabapentin 300 mg capsule; Commonly known as: Neurontin; TAKE ONE   CAPSULE BY MOUTH EVERY DAY AT BEDTIME   multivitamin with minerals tablet   Osteo Bi-Flex Triple Strength 750 mg-644 mg- 30 mg-1 mg tablet; Generic   drug: cpbslvos-zscc-ybb0-C-derek-bosw   Proctozone-HC 2.5 % rectal cream; Generic drug: hydrocortisone   psyllium 3.4 gram packet; Commonly known as: Metamucil   rosuvastatin 10 mg tablet; Commonly known as: Crestor; Take 1 tablet (10   mg) by mouth once daily.   saw-[place]-beta-herb-D3-B6-Zn 30-25-62.5 mg capsule       Outpatient Follow-Up  Future Appointments   Date Time Provider Department Center   3/25/2025  1:30 PM Isael Hernandez PA-C [id number] [place]   5/19/2025  9:00 AM Moshe Donahue MD [id number] [place]   5/20/2025  9:00 AM DO Andi Leiva[address]             Sallie Wooten [place] [address] [address] [place] [address]  [address]  [place] [address]     Phone: [phone]  Fax: [phone]

## 2025-03-07 NOTE — PROGRESS NOTES
"Occupational Therapy    Occupational Therapy    Evaluation    Patient Name: Sivakumar Hall \"Deshaun\"  MRN: [id number]  Today's Date: 3/7/2025  Time Calculation  Start Time: 1009  Stop Time: 1017  Time Calculation (min): 8 min  4118/4118-A    Assessment  IP OT Assessment  OT Assessment:  (Pt. has no skilled OT needs.)  Evaluation/Treatment Tolerance: Patient tolerated treatment well  End of Session Communication: Bedside nurse  End of Session Patient Position: Up in chair, Alarm off, not on at start of session    Plan:  Treatment Interventions: ADL retraining  OT Frequency: OT eval only  OT Discharge Recommendations: No further acute OT, No OT needed after discharge  OT - OK to Discharge: Yes (Next level of care when cleared by medical team)    Subjective Per EMR:    S/p C5-C6 Redo Anterior Cervical Discectomy and Fusion, extension of plate C5-C7 and LARYNGOSCOPY on 3/6/25    Current Problem:  1. Cervical radiculopathy  Full code    Type And Screen    Pulse oximetry, spot    povidone-iodine 5 % kit kit    celecoxib (CeleBREX) capsule 400 mg    acetaminophen (Tylenol) tablet 975 mg    tranexamic acid (Lysteda) tablet 1,300 mg    Place in outpatient/hospital ambulatory surgery    Full code    Pulse oximetry, spot    Place in outpatient/hospital ambulatory surgery    oxyCODONE (Roxicodone) 5 mg immediate release tablet    cyclobenzaprine (Flexeril) 10 mg tablet    CANCELED: NPO Diet Except: Sips with meds; Effective now    CANCELED: Height and weight    CANCELED: Insert and maintain peripheral IV    CANCELED: Saline lock IV    CANCELED: Vital Signs    CANCELED: Apply sequential compression device    CANCELED: Apply RISA hose    CANCELED: NPO Diet Except: Sips with meds; Effective now    CANCELED: Height and weight    CANCELED: Insert and maintain peripheral IV    CANCELED: Saline lock IV    CANCELED: Vital Signs    CANCELED: Apply sequential compression device    CANCELED: Apply RISA hose    CANCELED: FL less than 1 hour    " CANCELED: FL less than 1 hour          General:  General  Reason for Referral: ADLs, discharge planning  Referred By: Dr. Donahue  Family/Caregiver Present: No  Co-Treatment: PT  Co-Treatment Reason: Safety  Prior to Session Communication: Bedside nurse  Patient Position Received: Up in chair, Alarm off, not on at start of session    Precautions:  Medical Precautions: Fall precautions  Post-Surgical Precautions: Spinal precautions      Pain:  Pain Assessment  Pain Assessment: 0-10  0-10 (Numeric) Pain Score: 3  Pain Type: Surgical pain  Pain Location: Neck    Objective     Cognition:  Overall Cognitive Status: Within Functional Limits  Orientation Level: Oriented X4  Insight: Within function limits             Home Living:  Home Living Comments:  (Pt. lives with spouse and daughter in split level home with walk in shower in basement, PLOF independent, works as )     Prior Function:  Level of [place]: Independent with ADLs and functional transfers  ADL Assistance: Independent  Homemaking Assistance: Independent  Ambulatory Assistance: Independent        ADL:  Grooming Assistance: Independent  LE Dressing Assistance: Independent    Activity Tolerance:  Endurance: Endurance does not limit participation in activity    Bed Mobility/Transfers:      Transfers  Transfer:  (sit<>stand independent)    Ambulation/Gait Training:  Functional Mobility  Functional Mobility Performed:  (Completes functional mobility in halls without AD independent)    Sitting Balance:  Static Sitting Balance  Static Sitting-Balance Support: No upper extremity supported  Static Sitting-Level of Assistance: Independent    Standing Balance:  Static Standing Balance  Static Standing-Balance Support: No upper extremity supported  Static Standing-Level of Assistance: Independent      Sensation:  Sensation Comment: Denies numbness        Hand Function:  Hand Function  Gross Grasp: Functional  Coordination: Functional    Extremities: RUE   RUE  : Within Functional Limits and LUE   LUE: Within Functional Limits    Outcome Measures: [facility] Daily Activity  Putting on and taking off regular lower body clothing: None  Bathing (including washing, rinsing, drying): None  Putting on and taking off regular upper body clothing: None  Toileting, which includes using toilet, bedpan or urinal: None  Taking care of personal grooming such as brushing teeth: None  Eating Meals: None  Daily Activity - Total Score: 24                       EDUCATION:  Education  Individual(s) Educated: Patient  Education Provided: Fall precautons, Risk and benefits of OT discussed with patient or other  Patient Response to Education: Patient/Caregiver Verbalized Understanding of Information      Goals:   NA

## 2025-03-07 NOTE — PROGRESS NOTES
03/07/25 1137   Discharge Planning   Living Arrangements Spouse/significant other   Support Systems Spouse/significant other   Type of Residence Private residence   Number of Stairs to Enter Residence 0   Number of Stairs Within Residence 7  (Home is a split level, plans to stay on lower level initially.)   Home or Post Acute Services None   Expected Discharge Disposition Home   Does the patient need discharge transport arranged? No   Financial Resource Strain   How hard is it for you to pay for the very basics like food, housing, medical care, and heating? Not hard   Stroke Family Assessment   Stroke Family Assessment Needed No   Intensity of Service   Intensity of Service 0-30 min     Met with pt to review his dc plan. Lives at home with his family in a split level home. No steps leading into the home, appx 7 inside. Plans to stay in the lower level initially where there is a bathroom available. No use of assistant devices. Per therapy pt is independent. PCP is Dr. Andre and prescriptions are filled at [facility] with no barriers noted. Denies difficulty with living expenses such as utilities/groceries/prescriptions. Has a ride home.

## 2025-03-07 NOTE — PROGRESS NOTES
"Physical Therapy    Physical Therapy Evaluation    Patient Name: Sivakumar Hall \"Lizette"  MRN: [id number]  Today's Date: 3/7/2025   Time Calculation  Start Time: 1010  Stop Time: 1020  Time Calculation (min): 10 min  4118/4118-A    Assessment/Plan   PT Assessment  Evaluation/Treatment Tolerance: Patient tolerated treatment well  Medical Staff Made Aware: Yes  End of Session Communication: Bedside nurse  Assessment Comment: Pt is currently at baseline level of function and does not require skilled physical therapy in the hospital. Pt is able to ambulate and transfer independently without any assistive devices. DC PT.     End of Session Patient Position: Up in chair, Alarm off, not on at start of session  IP OR SWING BED PT PLAN  Inpatient or Swing Bed: Inpatient  PT Plan  PT Plan: PT Eval only  PT Eval Only Reason: At baseline function  PT Discharge Recommendations: No PT needed after discharge  PT Recommended Transfer Status: Independent  PT - OK to Discharge: Yes    Subjective     Current Problem:  1. Cervical radiculopathy  Full code    Type And Screen    Pulse oximetry, spot    povidone-iodine 5 % kit kit    celecoxib (CeleBREX) capsule 400 mg    acetaminophen (Tylenol) tablet 975 mg    tranexamic acid (Lysteda) tablet 1,300 mg    Place in outpatient/hospital ambulatory surgery    Full code    Pulse oximetry, spot    Place in outpatient/hospital ambulatory surgery    oxyCODONE (Roxicodone) 5 mg immediate release tablet    cyclobenzaprine (Flexeril) 10 mg tablet    CANCELED: NPO Diet Except: Sips with meds; Effective now    CANCELED: Height and weight    CANCELED: Insert and maintain peripheral IV    CANCELED: Saline lock IV    CANCELED: Vital Signs    CANCELED: Apply sequential compression device    CANCELED: Apply RISA hose    CANCELED: NPO Diet Except: Sips with meds; Effective now    CANCELED: Height and weight    CANCELED: Insert and maintain peripheral IV    CANCELED: Saline lock IV    CANCELED: Vital Signs    " CANCELED: Apply sequential compression device    CANCELED: Apply RISA hose    CANCELED: FL less than 1 hour    CANCELED: FL less than 1 hour        Patient Active Problem List   Diagnosis    ADD (attention deficit disorder) without hyperactivity    Cervical neuropathy    Depression with anxiety    Hyperlipidemia    Impingement syndrome of both shoulders    Strain of lumbar region    Displacement of lumbar intervertebral disc without myelopathy    Herniated lumbar intervertebral disc    Neck pain    Chronic midline low back pain without sciatica    Spondylosis of cervical region without myelopathy or radiculopathy    Spinal stenosis of cervical region    Cervical radiculopathy    Acute sinusitis    Cervical radiculopathy at C6       General Visit Information:  General  Reason for Referral: impaired mobility  Referred By: Dr. Donahue  Co-Treatment: OT  Co-Treatment Reason: Safety  Prior to Session Communication: Bedside nurse  Patient Position Received: Up in chair, Alarm off, not on at start of session  Preferred Learning Style: kinesthetic, verbal  General Comment: Pt agreeable to therapy    Home Living:  Home Living  Type of Home: House  Lives With: Spouse  Home Living Comments: split level home    Prior Level of Function:  Prior Function Per Pt/Caregiver Report  Level of [place]: Independent with ADLs and functional transfers  ADL Assistance: Independent  Homemaking Assistance: Independent  Ambulatory Assistance: Independent    Precautions:  Precautions  Medical Precautions: Fall precautions  Post-Surgical Precautions: Spinal precautions    Vital Signs:     Objective     Pain:  Pain Assessment  Pain Assessment: 0-10  0-10 (Numeric) Pain Score: 3  Pain Type: Surgical pain  Pain Location: Neck    Cognition:  Cognition  Overall Cognitive Status: Within Functional Limits  Orientation Level: Oriented X4    General Assessments:      Activity Tolerance  Endurance: Endurance does not limit participation in  activity  Sensation  Sensation Comment: Denies numbness              Static Sitting Balance  Static Sitting-Comment/Number of Minutes: good  Dynamic Sitting Balance  Dynamic Sitting-Comments: good  Static Standing Balance  Static Standing-Comment/Number of Minutes: good  Dynamic Standing Balance  Dynamic Standing-Comments: good    Functional Assessments:           Ambulation/Gait Training  Ambulation/Gait Training Performed:  (ambulate 250 ft independnet)  Stairs  Stairs:  (4 steps with rail independnet)       Extremity/Trunk Assessments:        RLE   RLE : Within Functional Limits  LLE   LLE : Within Functional Limits    Outcome Measures:     [facility] Basic Mobility  Turning from your back to your side while in a flat bed without using bedrails: None  Moving from lying on your back to sitting on the side of a flat bed without using bedrails: None  Moving to and from bed to chair (including a wheelchair): None  Standing up from a chair using your arms (e.g. wheelchair or bedside chair): None  To walk in hospital room: None  Climbing 3-5 steps with railing: None  Basic Mobility - Total Score: 24

## 2025-03-11 ENCOUNTER — APPOINTMENT (OUTPATIENT)
Facility: CLINIC | Age: 60
End: 2025-03-11
Payer: COMMERCIAL

## 2025-03-25 ENCOUNTER — APPOINTMENT (OUTPATIENT)
Dept: NEUROSURGERY | Facility: CLINIC | Age: 60
End: 2025-03-25
Payer: COMMERCIAL

## 2025-03-25 VITALS
BODY MASS INDEX: 31.66 KG/M2 | WEIGHT: 197 LBS | HEART RATE: 75 BPM | SYSTOLIC BLOOD PRESSURE: 116 MMHG | DIASTOLIC BLOOD PRESSURE: 62 MMHG | HEIGHT: 66 IN | TEMPERATURE: 96.3 F

## 2025-03-25 DIAGNOSIS — Z98.1 STATUS POST CERVICAL SPINAL FUSION: ICD-10-CM

## 2025-03-25 DIAGNOSIS — M62.838 MUSCLE SPASMS OF NECK: Primary | ICD-10-CM

## 2025-03-25 LAB
ATRIAL RATE: 56 BPM
P AXIS: 16 DEGREES
P OFFSET: 189 MS
P ONSET: 142 MS
PR INTERVAL: 136 MS
Q ONSET: 210 MS
QRS COUNT: 9 BEATS
QRS DURATION: 94 MS
QT INTERVAL: 424 MS
QTC CALCULATION(BAZETT): 409 MS
QTC FREDERICIA: 414 MS
R AXIS: 11 DEGREES
T AXIS: 48 DEGREES
T OFFSET: 422 MS
VENTRICULAR RATE: 56 BPM

## 2025-03-25 PROCEDURE — 3008F BODY MASS INDEX DOCD: CPT | Performed by: PHYSICIAN ASSISTANT

## 2025-03-25 PROCEDURE — 99024 POSTOP FOLLOW-UP VISIT: CPT | Performed by: PHYSICIAN ASSISTANT

## 2025-03-25 RX ORDER — CYCLOBENZAPRINE HCL 10 MG
10 TABLET ORAL 3 TIMES DAILY PRN
Qty: 90 TABLET | Refills: 0 | Status: SHIPPED | OUTPATIENT
Start: 2025-03-25 | End: 2025-04-24

## 2025-03-25 ASSESSMENT — PATIENT HEALTH QUESTIONNAIRE - PHQ9
SUM OF ALL RESPONSES TO PHQ9 QUESTIONS 1 & 2: 0
1. LITTLE INTEREST OR PLEASURE IN DOING THINGS: NOT AT ALL
2. FEELING DOWN, DEPRESSED OR HOPELESS: NOT AT ALL

## 2025-03-25 ASSESSMENT — LIFESTYLE VARIABLES
AUDIT-C TOTAL SCORE: 0
HOW OFTEN DO YOU HAVE A DRINK CONTAINING ALCOHOL: NEVER
HOW MANY STANDARD DRINKS CONTAINING ALCOHOL DO YOU HAVE ON A TYPICAL DAY: PATIENT DOES NOT DRINK
HOW OFTEN DO YOU HAVE SIX OR MORE DRINKS ON ONE OCCASION: NEVER
SKIP TO QUESTIONS 9-10: 1

## 2025-03-25 ASSESSMENT — PAIN SCALES - GENERAL: PAINLEVEL_OUTOF10: 2

## 2025-03-25 NOTE — PROGRESS NOTES
Patient describes pain as pinching behind his neck and shoulder. Pt states he has a lump on the incision he'd like to talk about and pain behind the muscle of his neck if he tilts his head to far.

## 2025-03-25 NOTE — LETTER
Elizabeth Rafael      3/25/2025    To Whom This May Concern:    My patient, Sivakumar Hall, is not recommended to drive. Please excuse his wife Elizabeth from Jury Duty so she may transport him to physical therapy, postoperative appointments, etc. until 5/19/2025.     Should you have any questions please do not hesitate to call.    Thank you for your cooperation.    Sincerely,    Isael Hernandez PA-C   Psychiatric Follow-up Note        Treatment plan signed and sent to scanning .  Consent to treat and Medication consent completed   3/6    HISTORY OF PRESENTING ILLNESS:    Orquidea Stephens was seen today for follow up of   1. Major depressive disorder, single episode, moderate (CMS/HCC)    2. Insomnia due to mental disorder    3. Generalized anxiety disorder                  Patient here today accompanied by her mother.    Never started Prozac after weaning off of Welbutrin   Per Mom was \"doing ok\" only taking Lorazepam in AM before school and since vacation \"has not asked for any\". Only taking Melatonin at night and sleeping ok .    Reports she just goes to school and \"shuts down\" doesn't talk to anyone and leaves. Getting work done . No calls to be picked up.  No reason for not restarting Prozac, just forgot.   Per Mom she is less isolated at home.        Positive family history of benefit from prozac.       Previous attempt to wean Wellbutrin was more depressed and believed it was Prozac because it was started as Wellbutrin weaned.     Attnetion concentration still an issue . Did very well in the past.       She was last seen October 15, 2019. At that time she was experiencing more anxiety and associated malaise nausea and headaches. She was taking 300 mg of Wellbutrin. Initially the Wellbutrin did help with the depression but towards the end of summer anxiety began to worsen and she had problems with attendance .      Mid October reduced Wellbutrin to 150 mg daily and started Prozac . Reports increase in anxiety, agitation, sensory sensitively, decreased capacity for concentration and focus and more feeling overwhelmed. She reported suicidal ideation mostly associated with feeling overwhelmed and wanting feelings to stop vs desire to be dead.        In respoinse to increased agitation stopped prozac and comes today for early follow up.   Many days resists going to school or Mom is called to pick her up due to  anxiety and \"not feeling well\".         Will be seeing a therapist through [facility] who goes to school to see patients.      In retrospect patient and mom feel that anxiety has been there historically but when she was more depressed it was not as evident and limiting.    Denies using any non prescribed substances        Recent PHQ 2/9 Score    PHQ 2:  Date Peds PHQ 2 Score   1/13/2020 4     Most Recent JED 7 Score       Date JED 7 Score   1/13/2020 11       Labs: none today    Current Medications:   Outpatient Medications Marked as Taking for the 1/13/20 encounter ([facility]) with Zunilda Redmond MD   Medication Sig Dispense Refill   • LORazepam (ATIVAN) 0.5 MG tablet 1/2 tablet 2-3 times a day as needed for acute anxiety 20 tablet 0   • traZODone (DESYREL) 50 MG tablet 1/2 to 1 po Q HS 30 tablet 1   • ketorolac (TORADOL) 10 MG tablet Take 1 tablet by mouth every 6 hours as needed for Pain. 20 tablet 1   • norgestimate-ethinyl estradiol (SPRINTEC 28) 0.25-35 MG-MCG per tablet Take 1 tablet by mouth daily. 84 tablet 3   • ibuprofen (MOTRIN) 400 MG tablet Take 1 tablet by mouth every 6 hours as needed for Pain. 30 tablet 0   • albuterol 108 (90 Base) MCG/ACT inhaler Inhale 2 puffs into the lungs every 4 hours as needed for Shortness of Breath or Wheezing. Dispense and use with spacer 1 Inhaler 1       Past Medical History:   Past Medical History:   Diagnosis Date   • Generalized anxiety disorder 1/11/2019   • Major depressive disorder, recurrent episode, moderate (CMS/HCC) 1/11/2019     Social History:  reports that she has never smoked. She has never used smokeless tobacco. She reports that she does not drink alcohol or use drugs.  Family History: family history includes Anxiety disorder in her maternal grandmother and mother; Depression in her paternal grandfather.      Visit Vitals  /62   Pulse 68   Resp 16   Ht 5' 7\" (1.702 m)   Wt 78.7 kg   LMP 08/20/2019   BMI 27.16 kg/m²       Wt  Readings from Last 3 Encounters:   01/13/20 78.7 kg (95 %, Z= 1.61)*   01/03/20 78.2 kg (94 %, Z= 1.59)*   11/11/19 71.7 kg (90 %, Z= 1.29)*     * Growth percentiles are based on [facility] (Girls, 2-20 Years) data.       ROS    see HPI; otherwise all negative    MENTAL STATUS EXAM     Hygiene Concerns:  []  Yes   [x]  No   Describe:    Appearance:   [x]  Unremarkable  []  Other    Distress:  []  Acute  []  Moderate   [x]  Mild    []  None  Orientation:  [x]  Normal/No Impairment  []  Person  []  Place  []  Time    Eye Contact: []   Maintained  [x] Avoided [] [place]ou intense  [] Improving  Engagement:          [x]  Open  []  Guarded   []  Resistant    Behavior:  [x]  Normal  []  Restless    [] Disruptive [] Oppositional                                           []Aggressive     []  Lethargic                                     Mood                       []  Euthymic   [x] Anxious     [x] Dysphoric    []  Apathetic                                                                                                                       Affect:  []  Normal  [x]  Constricted  [] Flat  []  Blunted                                   [] Labile  [x]   Irritable      [x] Appropriate to Content   [] Inappropriate to Content      Thought Processes: [x]  Congruent  []  Incongruent  [] Loose Associations     []  Poverty of Ideas  []  Tangential    []  Incoherence      Thought Content: [x]  Normal  []  Delusions  []  Obsessions  []  Phobias     []  Hypochondria  []  Sexual Preoccupation    Perception:            [x]  Normal  [] Hallucinations  []  Auditory  [] Visual      Speech:  [x]  Clear   [x]  Soft  []  Mumbling   []  Pressured       []  Rambling  []  Slurred [] Animated    Insight:  []  Good  [x]  Fair  []  Poor  Judgement:  []  Good  [x]  Fair  []  Poor  Recent Memory: [x]  Good  []  Fair  []  Poor  Remote Memory: [x]  Good  []  Fair  []  Poor  Concentration: []  Good  [x]  Fair  []  Poor  Attention:  []  Good  [x]  Fair  []   Poor    Motor:            [x]  Unremarkable   [] Gestures [] Grimaces  [] Twitches/Tics     []  Tremor  []  Other     Gait:   [x]  Normal  []  Slow/Retarded  []  Hyper   []  Uncoordinated  Posture:  [x]  Normal  []  Slumped  []  Rigid            ASSESSMENT/PLAN     1. Major depressive disorder, single episode, moderate (CMS/HCC)        Start Prozac 20 mg if no better or worse will will try Zoloft     2. Insomnia due to mental disorder      Avoidant of sleep and very grumpy in the AM     3. Generalized anxiety disorder          Working on coping strategies.       Return in about 7 weeks (around 3/2/2020).      Time spent  with patient , 25 minutes face to face   More than 50% of this time was spent in counseling and/or coordination of care.

## 2025-03-26 NOTE — PROGRESS NOTES
[facility]  Department of Neurological Surgery  Post Operative Patient Visit      History of Present Illness:  Sivakumar Hall is a 59 y.o. year old male who presents post C5-7 extension of prior ACDF by Dr. Moshe Donahue on March 6, 2025.  Patient has expected posterior cervical discomfort worsened with extension of head though endorses improvement in numbness and tingling in upper extremities and is pleased with the results at this time.  No longer utilizing prescription pain medication and continues on Tylenol and ice as necessary.  Does request refill for Flexeril muscle relaxer.  Inspection of his incision shows well-approximated, nonerythemic, nonedematous surgical incision with good fibrotic tissue spanning.  Will provide referral to outpatient physical therapy as well as an order for x-rays to be taken just prior to next follow-up with Dr. Donahue.        14/14 systems reviewed and negative other than what is listed in the history of present illness    Patient Active Problem List   Diagnosis    ADD (attention deficit disorder) without hyperactivity    Cervical neuropathy    Depression with anxiety    Hyperlipidemia    Impingement syndrome of both shoulders    Strain of lumbar region    Displacement of lumbar intervertebral disc without myelopathy    Herniated lumbar intervertebral disc    Neck pain    Chronic midline low back pain without sciatica    Spondylosis of cervical region without myelopathy or radiculopathy    Spinal stenosis of cervical region    Cervical radiculopathy    Acute sinusitis    Cervical radiculopathy at C6     Past Medical History:   Diagnosis Date    ADD (attention deficit disorder) 2000    Cervical disc disease 2001    Cervical disc disorder 04/2022    Cervical radiculopathy     Hyperlipidemia     Joint pain     Low back pain 2004    Lumbosacral disc disease 2004    Neck pain 2004    Spinal stenosis 2010    Vision loss      Past Surgical History:   Procedure  Laterality Date    ADENOIDECTOMY  1977    ANTERIOR CERVICAL DISCECTOMY W/ FUSION  2003    BACK SURGERY  2012    CERVICAL FUSION  2003    EYE SURGERY  2023    MICRODISCECTOMY LUMBAR      SPINE SURGERY  2003    TONSILLECTOMY  1977     Social History     Tobacco Use    Smoking status: Never    Smokeless tobacco: Never   Substance Use Topics    Alcohol use: Never     family history includes Cancer in his father; Prostate cancer in his father.    Current Outpatient Medications:     alpha lipoic acid 600 mg capsule, Take 1 capsule by mouth once daily., Disp: , Rfl:     ascorbic acid (Vitamin C) 1,000 mg tablet, Take 1 tablet (1,000 mg) by mouth once daily., Disp: , Rfl:     cholecalciferol (Vitamin D-3) 5,000 Units tablet, Take 1 tablet (5,000 Units) by mouth once daily., Disp: , Rfl:     cyclobenzaprine (Flexeril) 10 mg tablet, Take 1 tablet (10 mg) by mouth 3 times a day as needed for muscle spasms., Disp: 60 tablet, Rfl: 0    DULoxetine (Cymbalta) 30 mg DR capsule, Take 1 capsule (30 mg) by mouth once daily., Disp: 90 capsule, Rfl: 3    gabapentin (Neurontin) 300 mg capsule, TAKE ONE CAPSULE BY MOUTH EVERY DAY AT BEDTIME, Disp: 90 capsule, Rfl: 0    wusmwanv-igig-qdm4-C-derek-bosw (Osteo Bi-Flex Triple Strength) 750 mg-644 mg- 30 mg-1 mg tablet, Take 1 tablet by mouth once daily., Disp: , Rfl:     hydrocortisone (Proctozone-HC) 2.5 % rectal cream, Insert 1 Application into the rectum 2 times a day as needed., Disp: , Rfl:     multivitamin with minerals tablet, Take 1 tablet by mouth once daily., Disp: , Rfl:     psyllium (Metamucil) 3.4 gram packet, Take 1 packet by mouth once daily., Disp: , Rfl:     rosuvastatin (Crestor) 10 mg tablet, Take 1 tablet (10 mg) by mouth once daily., Disp: 90 tablet, Rfl: 0    saw-[place]-beta-herb-D3-B6-Zn 30-25-62.5 mg capsule, Take 1 capsule by mouth once daily. [employer] supplement, Disp: , Rfl:     cyclobenzaprine (Flexeril) 10 mg tablet, Take 1 tablet (10 mg) by  mouth 3 times a day as needed for muscle spasms., Disp: 90 tablet, Rfl: 0    lisdexamfetamine (Vyvanse) 20 mg capsule, Take 1 capsule (20 mg) by mouth once daily in the morning., Disp: 30 capsule, Rfl: 0  No Known Allergies      The above clinical summary has been dictated with voice recognition software. It has not been proofread for grammatical errors, typographical mistakes, or other semantic inconsistencies.    Thank you for visiting our office today. It was our pleasure to take part in your healthcare.     Do not hesitate to call with any questions regarding your plan of care after leaving at [phone] M-F 8am-4pm.     To clinicians, thank you very much for this kind referral. It is a privilege to partner with you in the care of your patients. My office would be delighted to assist you with any further consultations or with questions regarding the plan of care outlined. Do not hesitate to call the office or contact me directly.       Sincerely,      HANSEL Verde, PA-C  Associate Physician Assistant, Neurosurgery  Clinical   [facility] [employer]    [place] [address] [address] [place] [address]    Phone: [phone]  Fax: [phone]

## 2025-04-08 ENCOUNTER — EVALUATION (OUTPATIENT)
Dept: PHYSICAL THERAPY | Facility: CLINIC | Age: 60
End: 2025-04-08
Payer: COMMERCIAL

## 2025-04-08 DIAGNOSIS — M54.2 CERVICALGIA: Primary | ICD-10-CM

## 2025-04-08 DIAGNOSIS — M54.2 NECK PAIN: ICD-10-CM

## 2025-04-08 DIAGNOSIS — Z98.1 STATUS POST CERVICAL SPINAL FUSION: ICD-10-CM

## 2025-04-08 PROCEDURE — 97161 PT EVAL LOW COMPLEX 20 MIN: CPT | Mod: GP | Performed by: PHYSICAL THERAPIST

## 2025-04-08 PROCEDURE — 97110 THERAPEUTIC EXERCISES: CPT | Mod: GP | Performed by: PHYSICAL THERAPIST

## 2025-04-08 NOTE — PROGRESS NOTES
Physical Therapy    Physical Therapy Evaluation    Patient Name: Sivakumar Hall  MRN: [id number]  Today's Date: 4/8/2025  Time Calculation  Start Time: 1045  Stop Time: 1130  Time Calculation (min): 45 min    Problem List Items Addressed This Visit             ICD-10-CM    Neck pain M54.2    Relevant Orders    Follow Up In Physical Therapy    Cervicalgia - Primary M54.2    Relevant Orders    Follow Up In Physical Therapy     Other Visit Diagnoses         Codes    Status post cervical spinal fusion     Z98.1    Relevant Orders    Follow Up In Physical Therapy          Insurance:  Visit number: 1 of PT SO AUTH 4/7-4/21/25** 20V PCY 3 USED NEEDS AUTH [employer]   Insurance Type: Payor: ANTHEM / Plan: ANTHEM HMP / Product Type: *No Product type* /   Authorization or Plan of Care date Range:    General:  Surgery: C5-C6 Redo Anterior Cervical Discectomy and Fusion, extension of plate C5-C7 and Laryngoscopy  Date of Last Surgery: 3/6/2025   Post-Op Days: 33   Referred by: Isael Hernandez PA-C  Next MD appt:  NA     Precautions:  Post op precautions, 10# lift restriction for first 12 weeks.  Fall Risk: Low     Assessment:   C5-C6 Redo Anterior Cervical Discectomy and Fusion, extension of plate C5-C7 and Laryngoscopy on 3/6/25.  Still has some tingling in the L hand C7/8 distribution    Clinical Presentation: Stable and/or uncomplicated characteristics    Impairments: Pain, Active ROM, Flexibility, Motor function/control, Decreased functional level, and Sensory    Functional Limitations: Lifting, Sleep is interrupted., and Working     Recommended Treatment:  Therapeutic exercise, Manual therapy, Home program instruction and progression, Neuromuscular re-education, Therapeutic activities, and Self care and home management      Plan:  Plan of care was developed with input and agreement by the patient.  1 x week x 6 weeks.    Rehab Potential:   Good to achieve goals.    Goals:  Short Term Goals:   -Patient to be Indep with Home  Exercise Program for symptom management.      Long Term Goals:   -NDI score of < 10/50 impairment to indicate a significant improvement in overall function.      -Improve DNF endurance test to 10 seconds to allow for correct cervical mechanics.    -Patient will demonstrate correct posture with min to no cueing to allow for correct loading strategy.    -Patient will demo mild  limitation AROM of the cervical spine to allow for correct mechanics with functional mobility.      -Patient will report driving without pain to allow for return to work and ADLs without limitation.     -Patient will report reading >30 min without pain to allow for return to work and ADLs without limitation.    Subjective:  CC:  post op c5-7 fusion due to tingling in LUE  TERRI:  NKI, hx of C6/7 fusion 2002  DOI: chronic  PAIN - Location: pain in back of neck with neck extension Worst(past 24 hours): 3  Least(past 24 hours): 0  Pain Quality: aching  PAIN - Alleviating:  flexeril morning and night for pain, gabapentin at night.   Aggravating: AROM neck extension.  MEDICAL MANAGEMENT: Referred to Physical Therapy, Radiographs, OTC medication, Orthopedic specialist, and Physical therapy in the past  PLOF: Independent and pain free and Independent with manageable painLeft sided radiculopathy.  Has TENS unit @ home.  FUNCTIONAL LIMITATIONS: Reaching, Lifting, Sleep is interrupted., and Working    LIVING ENVIRONMENT: lives with spouse  WORK: off work due to surgery, planning to return to work - . May return to work in 12 weeks.  EXERCISE:  he is doing a little exercise on his recumbent.  Does have TM.   Patient Stated Goal: alleviate pain, restore function, sleep thru the night uninterrupted by pain, and return to work    Medical History Form: Reviewed (scanned into chart)    Objective:   - POSTURE:   Posture assessment positive for: forward head shoulders rounded forward scapular protraction  Protruded head: Yes;      - DERMATIOMES UE:   "NADEGELEA  Fifth Digit (C8):Impaired    - NECK AROM MOVEMENT LOSS:  Protrusion: Nil  Flexion: Minimal  Retraction: Moderate  Extension: Moderate  Sidebending(R): Major.  Ipsilateral pain.  Sidebending (L): Major.  Contralateral stretch.  Rotation (R): Moderate  Rotation (L): Moderate    - UPPER EXTREMITY MUSCLE STRENGTH:  Not formally tested due to surgery    Outcome Measures:  Other Measures  Neck Disability Index: 11/22    Treatment Performed: (\"NP\" = Not Performed)     Therapeutic Exercise:     25 minutes  Reviewed home exercise program.  Issued RED and YELLOW bands + anchor.  Access Code: [email or web address]  URL: [email or web address]  Date: 04/08/2025  Prepared by: [facility]    Exercises  - Standing Row with Anchored Resistance  - 3 x weekly - 2-3 sets - 10 reps  - Shoulder Extension with Resistance  - 3 x weekly - 2-3 sets - 10 reps  - Standing Shoulder Horizontal Abduction with Resistance  - 3 x weekly - 2-3 sets - 10 reps  - Shoulder External Rotation and Scapular Retraction with Resistance  - 3 x weekly - 2-3 sets - 10 reps  - Split Stance Chest Press with Resistance  - 3 x weekly - 2-3 sets - 10 reps  - Standing Bicep Curls with Resistance Band with PLB  - 3 x weekly - 2-3 sets - 10 reps  - Standing Shoulder Extension with Dowel  - 3 x weekly - 2-3 sets - 10 reps  - Standing Shoulder Flexion AAROM with Dowel  - 3 x weekly - 2-3 sets - 10 reps    Manual Therapy:       minutes      Neuromuscular Re-education:      minutes      Gait Training:            minutes      Aquatics:            minutes      Therapeutic Activity:      minutes      Modalities:       Vasopneumatic Device       minutes  Electrical Stimulation          minutes  Ultrasound            minutes  Iontophoresis                     minutes  Cold Pack            minutes  Mechanical Traction           minutes    Self Care Home Management:    minutes    Canalith Reposition:          minutes     Education:          " minutes    Other:       minutes      Evaluation Complexity: Low: 15 minutes; Moderate   minutes; Complex PT Evaluation Time Entry: 15;  minutes    Re-Evaluation:   minutes

## 2025-04-12 DIAGNOSIS — E78.5 HYPERLIPIDEMIA, UNSPECIFIED HYPERLIPIDEMIA TYPE: ICD-10-CM

## 2025-04-14 RX ORDER — ROSUVASTATIN CALCIUM 10 MG/1
10 TABLET, COATED ORAL DAILY
Qty: 90 TABLET | Refills: 0 | Status: SHIPPED | OUTPATIENT
Start: 2025-04-14

## 2025-04-16 ENCOUNTER — APPOINTMENT (OUTPATIENT)
Dept: PHYSICAL THERAPY | Facility: CLINIC | Age: 60
End: 2025-04-16
Payer: COMMERCIAL

## 2025-04-16 ENCOUNTER — DOCUMENTATION (OUTPATIENT)
Dept: PHYSICAL THERAPY | Facility: CLINIC | Age: 60
End: 2025-04-16
Payer: COMMERCIAL

## 2025-04-16 NOTE — PROGRESS NOTES
"Physical Therapy                 Therapy Communication Note    Patient Name: Sivakumar Hall \"Deshaun\"  MRN: [id number]  Department:   Room: Room/bed info not found  Today's Date: 4/16/2025     Discipline: Physical Therapy          Missed Visit Reason:  cx via MyChart    Missed Time: Cancel  "

## 2025-04-28 ENCOUNTER — APPOINTMENT (OUTPATIENT)
Dept: PHYSICAL THERAPY | Facility: CLINIC | Age: 60
End: 2025-04-28
Payer: COMMERCIAL

## 2025-04-29 ENCOUNTER — TREATMENT (OUTPATIENT)
Dept: PHYSICAL THERAPY | Facility: CLINIC | Age: 60
End: 2025-04-29
Payer: COMMERCIAL

## 2025-04-29 DIAGNOSIS — M54.2 CERVICALGIA: ICD-10-CM

## 2025-04-29 DIAGNOSIS — M54.2 NECK PAIN: ICD-10-CM

## 2025-04-29 DIAGNOSIS — Z98.1 STATUS POST CERVICAL SPINAL FUSION: ICD-10-CM

## 2025-04-29 PROCEDURE — 97110 THERAPEUTIC EXERCISES: CPT | Mod: GP,CQ

## 2025-04-29 NOTE — PROGRESS NOTES
"                                                                 PHYSICAL THERAPY TREATMENT NOTE    Patient Name:  Sivakumar Hall \"Deshaun\"   Patient MRN: [id number]  Date: 04/29/25    Time Calculation  Start Time: 0750  Stop Time: 0835  Time Calculation (min): 45 min    Insurance:  Visit number: 2 20V PCY 3 USED NEEDS AUTH [place]   Insurance Type: Payor: ANTHEM / Plan: ANTHEM HMP / Product Type: *No Product type* /   Authorization or Plan of Care date Range: 12V 4/9/25-7/7/25     Therapy diagnoses:   1. Cervicalgia  Follow Up In Physical Therapy      2. Neck pain  Follow Up In Physical Therapy      3. Status post cervical spinal fusion  Follow Up In Physical Therapy           General:  Surgery: C5-C6 Redo Anterior Cervical Discectomy and Fusion, extension of plate C5-C7 and Laryngoscopy  Date of Last Surgery: 3/6/2025   Post-Op Days: 33   Referred by: Isael Hernandez PA-C Next MD appt:  NA    Precautions:  Post op precautions, 10# lift restriction for first 12 weeks.  Fall Risk: Low    Assessment:  Education: Reviewed home exercise program. Provided verbal feedback to improve exercise technique. Updated HEP.  Progress towards functional goals:  Improved sleeping ability.  Response to interventions:  Mild stinging sensation at base of CS if he performs gentle cervical extension AROM too far. Initiated supine core stability with bridges and 90/90 toe-taps without irritability in the CS. Patient tolerated therapeutic interventions well and without any adverse events. Tolerated treatment session well without any increase in pain. Patient was appropriately fatigued with no complaints.  Justification for continued skilled care: To address remaining functional, objective and subjective deficits to allow the patient to return to full independence with ADLs. Skilled intervention required to improve ROM which will improve function. Progressive strengthening to stabilize the CS to improve function.    Plan:  Exercises targeting " "surrounding musculature to strengthen and improve function. Exercises to gradually increase flexibility and range of motion of the CS.    Subjective:   Sivakumar reports he feels like his condition is improving.  Shoulders hurting more than his neck. A little numbness in the finger tips sleeping better than he used to.  Progress towards functional goal:   Improved sleeping ability.   Pain (0-10): 2    HEP adherence / understanding: patient reports compliance with the instructed home exercises.    Objective:  8 weeks on 05/01/25    Treatment Performed: (\"NP\" = Not Performed)     Therapeutic Exercise:     45 minutes  Access Code: [email or web address] RED and YELLOW bands + anchor.   - UBE: 3' fwd 3' retro  - Standing Row 3 x 10 red  - Shoulder Extension 3 x 10 red  - Standing Shoulder Horizontal Abduction 3 x 10 red  - Shoulder External Rotation and Scapular Retraction 3 x 10 red  - Split Stance Chest Press with Resistance 3 x 10 red  - Standing Bicep Curls with Resistance Band with PLB 3 x 10 red  - Standing Shoulder Extension with Dowel 3 x 10  - Standing Shoulder Flexion AAROM with Dowel 3 x 10  - Gentle Cervical AROM 2 x 10 ea  - Bridges 5\" 2 x 10  - Supine 90/90 toe taps 2 x 10    Manual Therapy:       minutes      Neuromuscular Re-education:      minutes      Gait Training:            minutes      Aquatics:            minutes      Therapeutic Activity:      minutes      Gait Training:            minutes      Modalities:       Vasopneumatic Device       minutes  Electrical Stimulation          minutes  Ultrasound            minutes  Iontophoresis                     minutes  Cold Pack            minutes  Mechanical Traction           minutes    Self Care Home Management:    minutes    Canalith Reposition:          minutes     Education:          minutes    Other:       minutes      Evaluation Complexity: Low:   minutes; Moderate   minutes; Complex   minutes    Re-Evaluation:   minutes  "

## 2025-05-05 ENCOUNTER — APPOINTMENT (OUTPATIENT)
Facility: CLINIC | Age: 60
End: 2025-05-05
Payer: COMMERCIAL

## 2025-05-06 ENCOUNTER — HOSPITAL ENCOUNTER (OUTPATIENT)
Dept: RADIOLOGY | Facility: HOSPITAL | Age: 60
Discharge: HOME | End: 2025-05-06
Payer: COMMERCIAL

## 2025-05-06 ENCOUNTER — TREATMENT (OUTPATIENT)
Dept: PHYSICAL THERAPY | Facility: CLINIC | Age: 60
End: 2025-05-06
Payer: COMMERCIAL

## 2025-05-06 DIAGNOSIS — Z98.1 STATUS POST CERVICAL SPINAL FUSION: ICD-10-CM

## 2025-05-06 DIAGNOSIS — M54.2 CERVICALGIA: ICD-10-CM

## 2025-05-06 DIAGNOSIS — M54.2 NECK PAIN: ICD-10-CM

## 2025-05-06 PROCEDURE — 72040 X-RAY EXAM NECK SPINE 2-3 VW: CPT

## 2025-05-06 PROCEDURE — 72040 X-RAY EXAM NECK SPINE 2-3 VW: CPT | Performed by: RADIOLOGY

## 2025-05-06 PROCEDURE — 97110 THERAPEUTIC EXERCISES: CPT | Mod: GP,CQ

## 2025-05-06 NOTE — PROGRESS NOTES
"                                                                 PHYSICAL THERAPY TREATMENT NOTE    Patient Name:  Sivakumar Hall \"Deshaun\"   Patient MRN: [id number]  Date: 05/06/25    Time Calculation  Start Time: 1050  Stop Time: 1135  Time Calculation (min): 45 min    Insurance:  Visit number: 3 20V PCY 3 USED NEEDS AUTH [place]   Insurance Type: Payor: ANTHEM / Plan: ANTHEM HMP / Product Type: *No Product type* /   Authorization or Plan of Care date Range: 12V 4/9/25-7/7/25     Therapy diagnoses:   1. Cervicalgia  Follow Up In Physical Therapy      2. Neck pain  Follow Up In Physical Therapy      3. Status post cervical spinal fusion  Follow Up In Physical Therapy          General:  Surgery: C5-C6 Redo Anterior Cervical Discectomy and Fusion, extension of plate C5-C7 and Laryngoscopy  Date of Last Surgery: 3/6/2025   Post-Op Days: 33   Referred by: Isael Hernandez PA-C Next MD appt:  NA    Precautions:  Post op precautions, 10# lift restriction for first 12 weeks.  Fall Risk: Low    Assessment:  Education: Reviewed home exercise program. Provided verbal feedback to improve exercise technique. Updated HEP.  Progress towards functional goals: Improved sleeping ability. Increased cervical AROM in all planes.  Response to interventions: Mild pinching with cervical extension. Progressed theraband resistance for all UE strengthening without issue.  Patient tolerated therapeutic interventions well and without any adverse events. Tolerated treatment session well without any increase in pain. Patient was appropriately fatigued with no complaints.  Justification for continued skilled care: To address remaining functional, objective and subjective deficits to allow the patient to return to full independence with ADLs. Skilled intervention required to improve ROM which will improve function. Progressive strengthening to stabilize the CS to improve function.    Plan:  Exercises targeting surrounding musculature to strengthen and " "improve function. Exercises to gradually increase flexibility and range of motion of the CS.    Subjective:   Sivakumar reports he feels like his condition is improving.   Reports his neck seems fine today, his lower back is bothering him the most. Wants to get back to golf. Yesterday was 9 weeks post op.  Progress towards functional goal:  Improving with cervical AROM in all planes except extension.   Pain (0-10): 2    HEP adherence / understanding: patient reports compliance with the instructed home exercises.    Objective:  8 weeks on 05/01/25    Treatment Performed: (\"NP\" = Not Performed)     Therapeutic Exercise:     45 minutes  Access Code: [email or web address] RED and YELLOW bands + anchor.   - UBE: 3' fwd 3' retro  - Standing Row 3 x 10 blue  - Shoulder Extension 3 x 10 blue  - Standing Shoulder Horizontal Abduction 3 x 10 blue  - Shoulder External Rotation and Scapular Retraction 3 x 10 blue  - Split Stance Chest Press with Resistance 3 x 10 blue  - Standing Bicep Curls with Resistance Band with PLB 3 x 10 blue  - Standing Shoulder Extension with Dowel 3 x 10  - Standing Shoulder Flexion AAROM with Dowel 3 x 10  - Gentle Cervical AROM 2 x 10 ea  - Bridges 5\" 2 x 10  - Supine 90/90 toe taps 2 x 10  - Swiss ball DKTC with TA 3 x 10    Manual Therapy:       minutes      Neuromuscular Re-education:      minutes      Gait Training:            minutes      Aquatics:            minutes      Therapeutic Activity:      minutes      Gait Training:            minutes      Modalities:       Vasopneumatic Device       minutes  Electrical Stimulation          minutes  Ultrasound            minutes  Iontophoresis                     minutes  Cold Pack            minutes  Mechanical Traction           minutes    Self Care Home Management:    minutes    Canalith Reposition:          minutes     Education:          minutes    Other:       minutes      Evaluation Complexity: Low:   minutes; Moderate   minutes; Complex   " minutes    Re-Evaluation:   minutes

## 2025-05-13 ENCOUNTER — TREATMENT (OUTPATIENT)
Dept: PHYSICAL THERAPY | Facility: CLINIC | Age: 60
End: 2025-05-13
Payer: COMMERCIAL

## 2025-05-13 DIAGNOSIS — M54.2 CERVICALGIA: Primary | ICD-10-CM

## 2025-05-13 DIAGNOSIS — Z98.1 STATUS POST CERVICAL SPINAL FUSION: ICD-10-CM

## 2025-05-13 DIAGNOSIS — M54.2 NECK PAIN: ICD-10-CM

## 2025-05-13 PROCEDURE — 97110 THERAPEUTIC EXERCISES: CPT | Mod: GP | Performed by: PHYSICAL THERAPIST

## 2025-05-13 NOTE — PROGRESS NOTES
"                                                                 PHYSICAL THERAPY TREATMENT NOTE    Patient Name:  Sivakumar Hall \"Deshaun\"   Patient MRN: [id number]  Date: 05/13/25    Time Calculation  Start Time: 1000  Stop Time: 1040  Time Calculation (min): 40 min    Insurance:  Visit number: 4 20V PCY 3 USED NEEDS AUTH KAR[place]   Insurance Type: Payor: ANTHEM / Plan: ANTHEM HMP / Product Type: *No Product type* /   Authorization or Plan of Care date Range: 12V 4/9/25-7/7/25     Therapy diagnoses:   1. Cervicalgia  Follow Up In Physical Therapy      2. Neck pain  Follow Up In Physical Therapy      3. Status post cervical spinal fusion  Follow Up In Physical Therapy          General:  Surgery: C5-C6 Redo Anterior Cervical Discectomy and Fusion, extension of plate C5-C7 and Laryngoscopy  Date of Last Surgery: 3/6/2025   Post-Op Days: 33   Referred by: Isael Hernandez PA-C Next MD appt:  NA    Precautions:  Post op precautions, 10# lift restriction for first 12 weeks.    Fall Risk: Low    Subjective:   Reports doing well.  Patient reports just pain with neck extension localized to the neck.   Pain (0-10): 0    HEP adherence / understanding: patient reports compliance with the instructed home exercises.    Assessment:   Education: Reviewed home exercise program.     Progress towards functional goals: no pain with activities or exercise.  Neck seems to be doing rather well.  No radicular symptoms.  Strength is doing well.  He is limiting self to 10# lifting restriction.      Response to interventions: Patient tolerated therapeutic interventions well and without any adverse events.    Justification for continued skilled care: To address remaining functional, objective and subjective deficits to allow the patient to return to full independence with ADLs.      Plan:    Hold on PT x 1 week.  He will f/u in 2 weeks.  He will see MD next week.    Objective:   8 weeks on 05/01/25    Treatment Performed: (\"NP\" = Not Performed) " "    Therapeutic Exercise:     40 minutes  Access Code: [email or web address] RED and YELLOW bands + anchor.   UBE: 3' fwd 3' retro, Level 10  Standing Row Black 2 x 15  Shoulder Extension Black 2 x 15  Standing Shoulder Horizontal Abduction Black 2 x 15   Shoulder External Rotation and Scapular Retraction 2 x 15 Black  Split Stance Chest Press with Resistance 2 x 15 Black  Standing Bicep Curls with Resistance Band with PLB 3 x 10 blue  Standing Shoulder Extension with Dowel 3 x 10  Standing Shoulder Flexion AAROM with Dowel 3 x 10  Gentle Cervical AROM 2 x 10 ea  Bridges 5\" 2 x 10 - NP  Supine 90/90 toe taps 2 x 10 - NP  Swiss ball DKTC with TA 3 x 10 - NP    Manual Therapy:       minutes      Neuromuscular Re-education:      minutes      Gait Training:            minutes      Aquatics:            minutes      Therapeutic Activity:      minutes      Gait Training:            minutes      Modalities:       Vasopneumatic Device       minutes  Electrical Stimulation          minutes  Ultrasound            minutes  Iontophoresis                     minutes  Cold Pack            minutes  Mechanical Traction           minutes    Self Care Home Management:    minutes    Canalith Reposition:          minutes     Education:          minutes    Other:       minutes      Evaluation Complexity: Low:   minutes; Moderate   minutes; Complex   minutes    Re-Evaluation:   minutes  "

## 2025-05-19 ENCOUNTER — OFFICE VISIT (OUTPATIENT)
Facility: CLINIC | Age: 60
End: 2025-05-19
Payer: COMMERCIAL

## 2025-05-19 VITALS
DIASTOLIC BLOOD PRESSURE: 80 MMHG | BODY MASS INDEX: 31.82 KG/M2 | HEIGHT: 66 IN | HEART RATE: 69 BPM | WEIGHT: 198 LBS | TEMPERATURE: 97.8 F | SYSTOLIC BLOOD PRESSURE: 122 MMHG

## 2025-05-19 DIAGNOSIS — M54.16 LUMBAR RADICULOPATHY: Primary | ICD-10-CM

## 2025-05-19 DIAGNOSIS — M54.12 CERVICAL RADICULOPATHY: ICD-10-CM

## 2025-05-19 PROCEDURE — 99211 OFF/OP EST MAY X REQ PHY/QHP: CPT | Performed by: STUDENT IN AN ORGANIZED HEALTH CARE EDUCATION/TRAINING PROGRAM

## 2025-05-19 PROCEDURE — 1036F TOBACCO NON-USER: CPT | Performed by: STUDENT IN AN ORGANIZED HEALTH CARE EDUCATION/TRAINING PROGRAM

## 2025-05-19 PROCEDURE — 3008F BODY MASS INDEX DOCD: CPT | Performed by: STUDENT IN AN ORGANIZED HEALTH CARE EDUCATION/TRAINING PROGRAM

## 2025-05-19 ASSESSMENT — PROMIS GLOBAL HEALTH SCALE
CARRYOUT_PHYSICAL_ACTIVITIES: COMPLETELY
RATE_GENERAL_HEALTH: GOOD
RATE_MENTAL_HEALTH: VERY GOOD
EMOTIONAL_PROBLEMS: NEVER
CARRYOUT_SOCIAL_ACTIVITIES: GOOD
RATE_AVERAGE_FATIGUE: MILD
RATE_AVERAGE_PAIN: 2
RATE_SOCIAL_SATISFACTION: GOOD
RATE_QUALITY_OF_LIFE: GOOD
RATE_PHYSICAL_HEALTH: GOOD

## 2025-05-19 NOTE — PROGRESS NOTES
[facility] [place]  Department of Neurological Surgery  Post Operative Patient Visit      History of Present Illness:  Sivakumar Hall is a 60 y.o. year old male who presents to the spine clinic in a post operative visit. Since surgery they are 2-3 months s/p C5-7 extension of prior ACDF on 3/6/25. He is doing better. He has some numbness and tingling. He mentions that his hands go numb when riding a bike but he is not sure why. He has pain when looking up or with neck extension. His swallowing issues have improved, however his voice is still hoarse today in office. He is healing well and x-rays show hardware in good position. I will give him some more time to recover as he is gradually improving from a neck perspective.    He also notes pain in his low back and says he is close to the point where he cannot live with this back pain. I explained that we can try injections for his back pain as he only had them in his neck before. I would like to [place] back in 6-9 months for his back after he tries pain management prior to any surgical considerations. He states he is seeing a dietician later this week for weight loss management which I explained may also contribute positively to his back.    He will follow up as needed for his neck and call me if anything changes. He will keep in touch with me about how his low back goes.       The above clinical summary has been dictated with voice recognition software. It has not been proofread for grammatical errors, typographical mistakes, or other semantic inconsistencies.    Thank you for visiting our office today. It was our pleasure to take part in your healthcare.     Do not hesitate to call with any questions regarding your plan of care after leaving at [phone] M-F 8am-4pm.     To clinicians, thank you very much for this kind referral. It is a privilege to partner with you in the care of your patients. My office would be delighted to assist you with  any further consultations or with questions regarding the plan of care outlined. Do not hesitate to call the office or contact me directly.       Sincerely,      Moshe Donahue MD, [facility]  Spine , [facility]  Keyshawn Suresh Chair in Spinal Neurosurgery  Complex Spine Surgery Fellowship Director   of Neurological Surgery  [facility] [employer]  Phone: [phone]  Fax: [phone]        Scribe Attestation  By signing my name below, I, Saray Wood   attest that this documentation has been prepared under the direction and in the presence of Moseh Donahue MD.

## 2025-05-20 ENCOUNTER — APPOINTMENT (OUTPATIENT)
Dept: PRIMARY CARE | Facility: CLINIC | Age: 60
End: 2025-05-20
Payer: COMMERCIAL

## 2025-05-20 ENCOUNTER — APPOINTMENT (OUTPATIENT)
Dept: PHYSICAL THERAPY | Facility: CLINIC | Age: 60
End: 2025-05-20
Payer: COMMERCIAL

## 2025-05-20 VITALS
TEMPERATURE: 98.4 F | HEART RATE: 65 BPM | SYSTOLIC BLOOD PRESSURE: 106 MMHG | OXYGEN SATURATION: 98 % | DIASTOLIC BLOOD PRESSURE: 67 MMHG | WEIGHT: 200.4 LBS | HEIGHT: 66 IN | BODY MASS INDEX: 32.21 KG/M2

## 2025-05-20 DIAGNOSIS — F41.8 DEPRESSION WITH ANXIETY: ICD-10-CM

## 2025-05-20 DIAGNOSIS — E78.5 HYPERLIPIDEMIA, UNSPECIFIED HYPERLIPIDEMIA TYPE: ICD-10-CM

## 2025-05-20 DIAGNOSIS — F41.9 ANXIETY AND DEPRESSION: ICD-10-CM

## 2025-05-20 DIAGNOSIS — Z00.00 WELL ADULT EXAM: Primary | ICD-10-CM

## 2025-05-20 DIAGNOSIS — M48.02 SPINAL STENOSIS OF CERVICAL REGION: ICD-10-CM

## 2025-05-20 DIAGNOSIS — F32.A ANXIETY AND DEPRESSION: ICD-10-CM

## 2025-05-20 DIAGNOSIS — M51.26 HERNIATED LUMBAR INTERVERTEBRAL DISC: ICD-10-CM

## 2025-05-20 DIAGNOSIS — F98.8 ADD (ATTENTION DEFICIT DISORDER) WITHOUT HYPERACTIVITY: ICD-10-CM

## 2025-05-20 PROCEDURE — 1036F TOBACCO NON-USER: CPT | Performed by: FAMILY MEDICINE

## 2025-05-20 PROCEDURE — 3008F BODY MASS INDEX DOCD: CPT | Performed by: FAMILY MEDICINE

## 2025-05-20 PROCEDURE — 99396 PREV VISIT EST AGE 40-64: CPT | Performed by: FAMILY MEDICINE

## 2025-05-20 RX ORDER — DULOXETIN HYDROCHLORIDE 30 MG/1
30 CAPSULE, DELAYED RELEASE ORAL DAILY
Qty: 90 CAPSULE | Refills: 3 | Status: SHIPPED | OUTPATIENT
Start: 2025-05-20 | End: 2026-05-20

## 2025-05-20 RX ORDER — LISDEXAMFETAMINE DIMESYLATE 30 MG/1
30 CAPSULE ORAL EVERY MORNING
Qty: 30 CAPSULE | Refills: 0 | Status: SHIPPED | OUTPATIENT
Start: 2025-05-20 | End: 2025-06-19

## 2025-05-20 ASSESSMENT — ENCOUNTER SYMPTOMS
ENDOCRINE NEGATIVE: 1
DEPRESSION: 0
PSYCHIATRIC NEGATIVE: 1
NEUROLOGICAL NEGATIVE: 1
OCCASIONAL FEELINGS OF UNSTEADINESS: 0
LOSS OF SENSATION IN FEET: 0
CARDIOVASCULAR NEGATIVE: 1
RESPIRATORY NEGATIVE: 1
MUSCULOSKELETAL NEGATIVE: 1
GASTROINTESTINAL NEGATIVE: 1
CONSTITUTIONAL NEGATIVE: 1

## 2025-05-20 ASSESSMENT — PATIENT HEALTH QUESTIONNAIRE - PHQ9
1. LITTLE INTEREST OR PLEASURE IN DOING THINGS: NOT AT ALL
2. FEELING DOWN, DEPRESSED OR HOPELESS: NOT AT ALL
1. LITTLE INTEREST OR PLEASURE IN DOING THINGS: NOT AT ALL
SUM OF ALL RESPONSES TO PHQ9 QUESTIONS 1 AND 2: 0
2. FEELING DOWN, DEPRESSED OR HOPELESS: NOT AT ALL
SUM OF ALL RESPONSES TO PHQ9 QUESTIONS 1 AND 2: 0

## 2025-05-20 ASSESSMENT — PAIN SCALES - GENERAL: PAINLEVEL_OUTOF10: 2

## 2025-05-20 NOTE — PROGRESS NOTES
"Subjective   Patient ID: Deshaun Hall is a 60 y.o. male who presents for Annual Exam (Annual cpe fasting bw).    HPI     Review of Systems   Constitutional: Negative.    HENT: Negative.     Respiratory: Negative.     Cardiovascular: Negative.    Gastrointestinal: Negative.    Endocrine: Negative.    Genitourinary: Negative.    Musculoskeletal: Negative.    Neurological: Negative.    Psychiatric/Behavioral: Negative.         Objective   /67 (BP Location: Left arm)   Pulse 65   Temp 36.9 °C (98.4 °F) (Oral)   Ht 1.676 m (5' 6\")   Wt 90.9 kg (200 lb 6.4 oz)   SpO2 98%   BMI 32.35 kg/m²     Physical Exam  Vitals and nursing note reviewed.   Constitutional:       Appearance: Normal appearance.   HENT:      Right Ear: Tympanic membrane normal.      Left Ear: Tympanic membrane normal.      Mouth/Throat:      Pharynx: Oropharynx is clear.   Cardiovascular:      Rate and Rhythm: Normal rate and regular rhythm.      Pulses: Normal pulses.      Heart sounds: Normal heart sounds.   Pulmonary:      Breath sounds: Normal breath sounds.   Abdominal:      Palpations: Abdomen is soft.   Musculoskeletal:      Comments: Lumbar stenosis recent cervical laminectomy    Neurological:      General: No focal deficit present.      Mental Status: He is alert and oriented to person, place, and time.   Psychiatric:         Mood and Affect: Mood normal.         Behavior: Behavior normal.         Assessment/Plan patient seen here for a general physical.  We are having his lab work drawn today.  We are restarting his Vyvanse that he was off of with his recent cervical laminectomy.  Will see him back in 3 months  Problem List Items Addressed This Visit           ICD-10-CM    ADD (attention deficit disorder) without hyperactivity F98.8    Relevant Medications    lisdexamfetamine (Vyvanse) 30 mg capsule    Other Relevant Orders    Drug Screen, Urine With Reflex to Confirmation    Depression with anxiety F41.8    Hyperlipidemia E78.5    " Herniated lumbar intervertebral disc M51.26    Spinal stenosis of cervical region M48.02     Other Visit Diagnoses         Codes      Well adult exam    -  Primary Z00.00    Relevant Orders    Prostate Specific Antigen    Lipid Panel    CBC and Auto Differential    Comprehensive Metabolic Panel      BMI 32.0-32.9,adult     Z68.32      Anxiety and depression     F41.9, F32.A    Relevant Medications    DULoxetine (Cymbalta) 30 mg DR capsule

## 2025-05-21 LAB
ALBUMIN SERPL-MCNC: 4.6 G/DL (ref 3.6–5.1)
ALP SERPL-CCNC: 68 U/L (ref 35–144)
ALT SERPL-CCNC: 26 U/L (ref 9–46)
AMPHETAMINES UR QL: NEGATIVE NG/ML
ANION GAP SERPL CALCULATED.4IONS-SCNC: 4 MMOL/L (CALC) (ref 7–17)
AST SERPL-CCNC: 23 U/L (ref 10–35)
BARBITURATES UR QL: NEGATIVE NG/ML
BASOPHILS # BLD AUTO: 52 CELLS/UL (ref 0–200)
BASOPHILS NFR BLD AUTO: 1 %
BENZODIAZ UR QL: NEGATIVE NG/ML
BILIRUB SERPL-MCNC: 0.6 MG/DL (ref 0.2–1.2)
BUN SERPL-MCNC: 12 MG/DL (ref 7–25)
BZE UR QL: NEGATIVE NG/ML
CALCIUM SERPL-MCNC: 9.7 MG/DL (ref 8.6–10.3)
CHLORIDE SERPL-SCNC: 106 MMOL/L (ref 98–110)
CHOLEST SERPL-MCNC: 188 MG/DL
CHOLEST/HDLC SERPL: 2.9 (CALC)
CO2 SERPL-SCNC: 30 MMOL/L (ref 20–32)
CREAT SERPL-MCNC: 0.96 MG/DL (ref 0.7–1.35)
CREAT UR-MCNC: 126.8 MG/DL
EGFRCR SERPLBLD CKD-EPI 2021: 90 ML/MIN/1.73M2
EOSINOPHIL # BLD AUTO: 140 CELLS/UL (ref 15–500)
EOSINOPHIL NFR BLD AUTO: 2.7 %
ERYTHROCYTE [DISTWIDTH] IN BLOOD BY AUTOMATED COUNT: 12.5 % (ref 11–15)
FENTANYL UR QL SCN: NEGATIVE NG/ML
GLUCOSE SERPL-MCNC: 84 MG/DL (ref 65–99)
HCT VFR BLD AUTO: 49.5 % (ref 38.5–50)
HDLC SERPL-MCNC: 64 MG/DL
HGB BLD-MCNC: 15.6 G/DL (ref 13.2–17.1)
LDLC SERPL CALC-MCNC: 105 MG/DL (CALC)
LYMPHOCYTES # BLD AUTO: 1508 CELLS/UL (ref 850–3900)
LYMPHOCYTES NFR BLD AUTO: 29 %
MCH RBC QN AUTO: 31.1 PG (ref 27–33)
MCHC RBC AUTO-ENTMCNC: 31.5 G/DL (ref 32–36)
MCV RBC AUTO: 98.8 FL (ref 80–100)
METHADONE UR QL: NEGATIVE NG/ML
MONOCYTES # BLD AUTO: 374 CELLS/UL (ref 200–950)
MONOCYTES NFR BLD AUTO: 7.2 %
NEUTROPHILS # BLD AUTO: 3125 CELLS/UL (ref 1500–7800)
NEUTROPHILS NFR BLD AUTO: 60.1 %
NONHDLC SERPL-MCNC: 124 MG/DL (CALC)
OPIATES UR QL: NEGATIVE NG/ML
OXIDANTS UR QL: NEGATIVE MCG/ML
OXYCODONE UR QL: NEGATIVE NG/ML
PCP UR QL: NEGATIVE NG/ML
PH UR: 7.1 [PH] (ref 4.5–9)
PLATELET # BLD AUTO: 186 THOUSAND/UL (ref 140–400)
PMV BLD REES-ECKER: 11.4 FL (ref 7.5–12.5)
POTASSIUM SERPL-SCNC: 4.9 MMOL/L (ref 3.5–5.3)
PROT SERPL-MCNC: 7.1 G/DL (ref 6.1–8.1)
PSA SERPL-MCNC: 0.31 NG/ML
QUEST NOTES AND COMMENTS: NORMAL
RBC # BLD AUTO: 5.01 MILLION/UL (ref 4.2–5.8)
SODIUM SERPL-SCNC: 140 MMOL/L (ref 135–146)
THC UR QL: NEGATIVE NG/ML
TRIGL SERPL-MCNC: 101 MG/DL
WBC # BLD AUTO: 5.2 THOUSAND/UL (ref 3.8–10.8)

## 2025-05-27 ENCOUNTER — TREATMENT (OUTPATIENT)
Dept: PHYSICAL THERAPY | Facility: CLINIC | Age: 60
End: 2025-05-27
Payer: COMMERCIAL

## 2025-05-27 DIAGNOSIS — Z98.1 STATUS POST CERVICAL SPINAL FUSION: ICD-10-CM

## 2025-05-27 DIAGNOSIS — M54.2 NECK PAIN: ICD-10-CM

## 2025-05-27 DIAGNOSIS — M54.2 CERVICALGIA: Primary | ICD-10-CM

## 2025-05-27 PROCEDURE — 97110 THERAPEUTIC EXERCISES: CPT | Mod: GP | Performed by: PHYSICAL THERAPIST

## 2025-05-27 NOTE — PROGRESS NOTES
"                                                                 PHYSICAL THERAPY TREATMENT NOTE    Patient Name:  Sivakumar Hall \"Deshaun\"   Patient MRN: [id number]  Date: 05/27/25    Time Calculation  Start Time: 1045  Stop Time: 1130  Time Calculation (min): 45 min    Insurance:  Visit number: 5 20V PCY 3 USED NEEDS AUTH [place]   Insurance Type: Payor: ANTHEM / Plan: ANTHEM HMP / Product Type: *No Product type* /   Authorization or Plan of Care date Range: 12V 4/9/25-7/7/25     Therapy diagnoses:   1. Cervicalgia  Follow Up In Physical Therapy      2. Neck pain  Follow Up In Physical Therapy      3. Status post cervical spinal fusion  Follow Up In Physical Therapy        General:  Surgery: C5-C6 Redo Anterior Cervical Discectomy and Fusion, extension of plate C5-C7 and Laryngoscopy  Date of Last Surgery: 3/6/2025   Post-Op Days: 33   Referred by: Isael Hernandez PA-C Next MD appt:  NA    Precautions:  Post op precautions, 10# lift restriction for first 12 weeks.    Fall Risk: Low    Subjective:   Sivakumar reports he feels like his condition is improving. Has some pain at his end range neck extension that is localized to lower cervical.  Other wise has no complaints.  Was cleared to golf.   He also reports some chronic shoulder pain   Pain (0-10): 0     Reassessment:   Sivakumar Hall has completed 5 physical therapy sessions from 4/8/2024 to 5/27/25 to address C5-C6 Redo Anterior Cervical Discectomy and Fusion, extension of plate C5-C7 and Laryngoscopy .  Treatment has included Therapeutic exercise, Manual therapy, Home program instruction and progression, Neuromuscular re-education, Self care and home management, and Instruction in activity modification.  he reports patient reports compliance with the instructed home exercises..  Sivakumar has made significant progress towards the established therapy goals.  At this time I recommend Sivakumar be discharged from physical therapy and continue with home " "exercises.    Goals:  Short Term Goals:   -Patient to be Indep with Home Exercise Program for symptom management.   5/27/25: MET     Long Term Goals:   -NDI score of < 10/50 impairment to indicate a significant improvement in overall function.      5/27/25: MET    -Improve DNF endurance test to 10 seconds to allow for correct cervical mechanics.    5/27/25: MET    -Patient will demonstrate correct posture with min to no cueing to allow for correct loading strategy.    5/27/25: MET    -Patient will demo mild  limitation AROM of the cervical spine to allow for correct mechanics with functional mobility.      5/27/25: MET    -Patient will report driving without pain to allow for return to work and ADLs without limitation.     5/27/25: MET    -Patient will report reading >30 min without pain to allow for return to work and ADLs without limitation.   5/27/25: MET    Outcome Measures:  Other Measures  Neck Disability Index: 2    Objective:   Complains of chronic bilateral shoulder pain. NKI. L>R.  Point tender L AC - L AC id protruded more than R - but just slightly.  L shoulder AROM is mildly limited.    Treatment Performed: (\"NP\" = Not Performed)     Therapeutic Exercise:     45 minutes  Access Code: [id number] RED and YELLOW bands + anchor.   UBE: 3' fwd 3' retro, Level 10  Pulleys x 2 minutes  Standing Row Black 2 x 15  Shoulder Extension Black 2 x 15  Standing Shoulder Horizontal Abduction Black 2 x 15   Shoulder External Rotation and Scapular Retraction 2 x 15 Black  Split Stance Chest Press with Resistance 2 x 15 Black  Standing Bicep Curls with Resistance Band with PLB 3 x 10 blue  Standing Shoulder Extension with Dowel 3 x 10  **ACTIVITIES BELOW WERE NOT PERFORMED**   Standing Shoulder Flexion AAROM with Dowel 3 x 10  Gentle Cervical AROM 2 x 10 ea  Bridges 5\" 2 x 10 - NP  Supine 90/90 toe taps 2 x 10 - NP  Swiss ball DKTC with TA 3 x 10 - NP    Manual Therapy:       minutes      Neuromuscular Re-education:      " minutes      Gait Training:            minutes      Aquatics:            minutes      Therapeutic Activity:      minutes      Gait Training:            minutes      Modalities:       Vasopneumatic Device       minutes  Electrical Stimulation          minutes  Ultrasound            minutes  Iontophoresis                     minutes  Cold Pack            minutes  Mechanical Traction           minutes    Self Care Home Management:    minutes    Canalith Reposition:          minutes     Education:          minutes    Other:       minutes      Evaluation Complexity: Low:   minutes; Moderate   minutes; Complex   minutes    Re-Evaluation:   minutes

## 2025-06-17 ENCOUNTER — OFFICE VISIT (OUTPATIENT)
Dept: PAIN MEDICINE | Facility: CLINIC | Age: 60
End: 2025-06-17
Payer: COMMERCIAL

## 2025-06-17 VITALS
RESPIRATION RATE: 18 BRPM | DIASTOLIC BLOOD PRESSURE: 83 MMHG | WEIGHT: 185 LBS | OXYGEN SATURATION: 97 % | HEART RATE: 61 BPM | BODY MASS INDEX: 29.73 KG/M2 | HEIGHT: 66 IN | TEMPERATURE: 97.2 F | SYSTOLIC BLOOD PRESSURE: 129 MMHG

## 2025-06-17 DIAGNOSIS — M48.061 NEURAL FORAMINAL STENOSIS OF LUMBAR SPINE: Primary | ICD-10-CM

## 2025-06-17 PROCEDURE — 99213 OFFICE O/P EST LOW 20 MIN: CPT | Performed by: ANESTHESIOLOGY

## 2025-06-17 PROCEDURE — 3008F BODY MASS INDEX DOCD: CPT | Performed by: ANESTHESIOLOGY

## 2025-06-17 PROCEDURE — 1036F TOBACCO NON-USER: CPT | Performed by: ANESTHESIOLOGY

## 2025-06-17 ASSESSMENT — PAIN SCALES - GENERAL
PAINLEVEL_OUTOF10: 7
PAINLEVEL_OUTOF10: 7

## 2025-06-17 ASSESSMENT — PATIENT HEALTH QUESTIONNAIRE - PHQ9
2. FEELING DOWN, DEPRESSED OR HOPELESS: NOT AT ALL
SUM OF ALL RESPONSES TO PHQ9 QUESTIONS 1 AND 2: 0
1. LITTLE INTEREST OR PLEASURE IN DOING THINGS: NOT AT ALL

## 2025-06-17 ASSESSMENT — LIFESTYLE VARIABLES: TOTAL SCORE: 2

## 2025-06-17 ASSESSMENT — ENCOUNTER SYMPTOMS
BACK PAIN: 1
DEPRESSION: 0
CONSTIPATION: 0
OCCASIONAL FEELINGS OF UNSTEADINESS: 0
WEAKNESS: 0
LOSS OF SENSATION IN FEET: 0
SHORTNESS OF BREATH: 0

## 2025-06-17 ASSESSMENT — PAIN DESCRIPTION - DESCRIPTORS: DESCRIPTORS: ACHING

## 2025-06-17 ASSESSMENT — PAIN - FUNCTIONAL ASSESSMENT: PAIN_FUNCTIONAL_ASSESSMENT: 0-10

## 2025-06-17 NOTE — H&P (VIEW-ONLY)
"  Chief Complain  Follow-up (For pain in low back. Had MRI done 2/2025, Would like to discuss a injection.)       History Of Present Illness  Sivakumar Hall \"Lizette" is a 60 y.o. male here for low back pain. The patient rates the pain at 7  on a scale from 0-10.  The patient describes pain as pinching.  The pain is worsened by bending forward and standing and is alleviated by sitting.  Since the last visit the pain has stayed the same.  The patient denies any fever, chills, weight loss, bladder/bowel incontinence.       Past Medical History  He has a past medical history of ADD (attention deficit disorder) (2000), Cervical disc disease (2001), Cervical disc disorder (04/2022), Cervical radiculopathy, Hyperlipidemia, Joint pain, Low back pain (2004), Lumbosacral disc disease (2004), Neck pain (2004), Spinal stenosis (2010), and Vision loss.    He has no past medical history of Personal history of irradiation.    Surgical History  He has a past surgical history that includes Cervical fusion (2003); Anterior cervical discectomy w/ fusion (2003); Microdiscectomy lumbar; Back surgery (2012); Eye surgery (2023); Spine surgery (2003); Adenoidectomy (1977); and Tonsillectomy (1977).     Social History  He reports that he has never smoked. He has never used smokeless tobacco. He reports current alcohol use. He reports that he does not use drugs.    Family History  Family History   Problem Relation Name Age of Onset    Prostate cancer Father Rocco Hall     Cancer Father Rocco Hall         Allergies  Patient has no known allergies.    Review of Systems  Review of Systems   Respiratory:  Negative for shortness of breath.    Cardiovascular:  Negative for chest pain.   Gastrointestinal:  Negative for constipation.   Musculoskeletal:  Positive for back pain.   Neurological:  Negative for weakness.   Psychiatric/Behavioral:  Negative for suicidal ideas.         Physical Exam  Physical Exam  HENT:      Head: Normocephalic and " "atraumatic.   Eyes:      Extraocular Movements: Extraocular movements intact.      Pupils: Pupils are equal, round, and reactive to light.   Pulmonary:      Effort: Pulmonary effort is normal.   Musculoskeletal:      Cervical back: Neck supple.   Neurological:      Mental Status: He is alert and oriented to person, place, and time.      Motor: Motor function is intact.      Deep Tendon Reflexes:      Reflex Scores:       Patellar reflexes are 1+ on the right side and 1+ on the left side.       Achilles reflexes are 1+ on the right side.  Psychiatric:         Mood and Affect: Mood normal.         Behavior: Behavior normal.           Last Recorded Vitals  Blood pressure 129/83, pulse 61, temperature 36.2 °C (97.2 °F), resp. rate 18, height 1.676 m (5' 6\"), weight 83.9 kg (185 lb), SpO2 97%.    Reviewed Images  Reviewed and independently interpreted MRI Lumbar spine degenerative changes at L4-5, L5-S1, severe neuroforaminal stenosis bilateral L5-S1, type I Modic changes at L5-S1    Reviewed Labs   Latest Reference Range & Units 05/20/25 09:47   WHITE BLOOD CELL COUNT 3.8 - 10.8 Thousand/uL 5.2   RED BLOOD CELL COUNT 4.20 - 5.80 Million/uL 5.01   HEMOGLOBIN 13.2 - 17.1 g/dL 15.6   HEMATOCRIT 38.5 - 50.0 % 49.5   MCV 80.0 - 100.0 fL 98.8   MCH 27.0 - 33.0 pg 31.1   MCHC 32.0 - 36.0 g/dL 31.5 (L)   RDW 11.0 - 15.0 % 12.5   PLATELET COUNT 140 - 400 Thousand/uL 186   MPV 7.5 - 12.5 fL 11.4   (L): Data is abnormally low   Latest Reference Range & Units 05/20/25 09:47   BILIRUBIN, TOTAL 0.2 - 1.2 mg/dL 0.6   CHOLESTEROL, TOTAL <200 mg/dL 188   HDL CHOLESTEROL > OR = 40 mg/dL 64   CHOL/HDLC RATIO <5.0 (calc) 2.9   LDL-CHOLESTEROL mg/dL (calc) 105 (H)   TRIGLYCERIDES <150 mg/dL 101   NON HDL CHOLESTEROL <130 mg/dL (calc) 124   (H): Data is abnormally high     Assessment/Plan     Sivakumar Hall \"Lizette" is a 60 y.o. male here for chronic low back pain.  Recently had cervical fusion reports improvement in the neck pain.  Currently " most bothered by his low back pain.  Reviewed recently done MRI of his lumbar spine does reveal type I Modic changes at L4-5, L5-S1, disc degenerative changes as well as severe neuroforaminal narrowing bilateral L5-S1.  He has failed conservative treatment with physical therapy, analgesics.  I would recommend trial of L5-S1 epidural steroid injection as next step in managing his pain.    aBy Tripp MD

## 2025-06-17 NOTE — PROGRESS NOTES
"  Chief Complain  Follow-up (For pain in low back. Had MRI done 2/2025, Would like to discuss a injection.)       History Of Present Illness  Sivakumar Hall \"iLzette" is a 60 y.o. male here for low back pain. The patient rates the pain at 7  on a scale from 0-10.  The patient describes pain as pinching.  The pain is worsened by bending forward and standing and is alleviated by sitting.  Since the last visit the pain has stayed the same.  The patient denies any fever, chills, weight loss, bladder/bowel incontinence.       Past Medical History  He has a past medical history of ADD (attention deficit disorder) (2000), Cervical disc disease (2001), Cervical disc disorder (04/2022), Cervical radiculopathy, Hyperlipidemia, Joint pain, Low back pain (2004), Lumbosacral disc disease (2004), Neck pain (2004), Spinal stenosis (2010), and Vision loss.    He has no past medical history of Personal history of irradiation.    Surgical History  He has a past surgical history that includes Cervical fusion (2003); Anterior cervical discectomy w/ fusion (2003); Microdiscectomy lumbar; Back surgery (2012); Eye surgery (2023); Spine surgery (2003); Adenoidectomy (1977); and Tonsillectomy (1977).     Social History  He reports that he has never smoked. He has never used smokeless tobacco. He reports current alcohol use. He reports that he does not use drugs.    Family History  Family History   Problem Relation Name Age of Onset    Prostate cancer Father Rocco Hall     Cancer Father Rocco Hall         Allergies  Patient has no known allergies.    Review of Systems  Review of Systems   Respiratory:  Negative for shortness of breath.    Cardiovascular:  Negative for chest pain.   Gastrointestinal:  Negative for constipation.   Musculoskeletal:  Positive for back pain.   Neurological:  Negative for weakness.   Psychiatric/Behavioral:  Negative for suicidal ideas.         Physical Exam  Physical Exam  HENT:      Head: Normocephalic and " "atraumatic.   Eyes:      Extraocular Movements: Extraocular movements intact.      Pupils: Pupils are equal, round, and reactive to light.   Pulmonary:      Effort: Pulmonary effort is normal.   Musculoskeletal:      Cervical back: Neck supple.   Neurological:      Mental Status: He is alert and oriented to person, place, and time.      Motor: Motor function is intact.      Deep Tendon Reflexes:      Reflex Scores:       Patellar reflexes are 1+ on the right side and 1+ on the left side.       Achilles reflexes are 1+ on the right side.  Psychiatric:         Mood and Affect: Mood normal.         Behavior: Behavior normal.           Last Recorded Vitals  Blood pressure 129/83, pulse 61, temperature 36.2 °C (97.2 °F), resp. rate 18, height 1.676 m (5' 6\"), weight 83.9 kg (185 lb), SpO2 97%.    Reviewed Images  Reviewed and independently interpreted MRI Lumbar spine degenerative changes at L4-5, L5-S1, severe neuroforaminal stenosis bilateral L5-S1, type I Modic changes at L5-S1    Reviewed Labs   Latest Reference Range & Units 05/20/25 09:47   WHITE BLOOD CELL COUNT 3.8 - 10.8 Thousand/uL 5.2   RED BLOOD CELL COUNT 4.20 - 5.80 Million/uL 5.01   HEMOGLOBIN 13.2 - 17.1 g/dL 15.6   HEMATOCRIT 38.5 - 50.0 % 49.5   MCV 80.0 - 100.0 fL 98.8   MCH 27.0 - 33.0 pg 31.1   MCHC 32.0 - 36.0 g/dL 31.5 (L)   RDW 11.0 - 15.0 % 12.5   PLATELET COUNT 140 - 400 Thousand/uL 186   MPV 7.5 - 12.5 fL 11.4   (L): Data is abnormally low   Latest Reference Range & Units 05/20/25 09:47   BILIRUBIN, TOTAL 0.2 - 1.2 mg/dL 0.6   CHOLESTEROL, TOTAL <200 mg/dL 188   HDL CHOLESTEROL > OR = 40 mg/dL 64   CHOL/HDLC RATIO <5.0 (calc) 2.9   LDL-CHOLESTEROL mg/dL (calc) 105 (H)   TRIGLYCERIDES <150 mg/dL 101   NON HDL CHOLESTEROL <130 mg/dL (calc) 124   (H): Data is abnormally high     Assessment/Plan     Sivakumar Hall \"Lizette" is a 60 y.o. male here for chronic low back pain.  Recently had cervical fusion reports improvement in the neck pain.  Currently " most bothered by his low back pain.  Reviewed recently done MRI of his lumbar spine does reveal type I Modic changes at L4-5, L5-S1, disc degenerative changes as well as severe neuroforaminal narrowing bilateral L5-S1.  He has failed conservative treatment with physical therapy, analgesics.  I would recommend trial of L5-S1 epidural steroid injection as next step in managing his pain.    Bay Tripp MD

## 2025-06-19 ENCOUNTER — TELEPHONE (OUTPATIENT)
Dept: PRIMARY CARE | Facility: CLINIC | Age: 60
End: 2025-06-19
Payer: COMMERCIAL

## 2025-06-19 DIAGNOSIS — F98.8 ADD (ATTENTION DEFICIT DISORDER) WITHOUT HYPERACTIVITY: ICD-10-CM

## 2025-06-19 RX ORDER — LISDEXAMFETAMINE DIMESYLATE 30 MG/1
30 CAPSULE ORAL EVERY MORNING
Qty: 7 CAPSULE | Refills: 0 | Status: SHIPPED | OUTPATIENT
Start: 2025-06-19 | End: 2025-06-26

## 2025-06-19 NOTE — TELEPHONE ENCOUNTER
Rx Refill Request Telephone Encounter    Name:  Sivakumar Hall  :  [id number]  Medication Name:  lisdexamfetamine (Vyvanse) 30 mg     Specific Pharmacy location:    [facility] [phone]      Date of last appointment: 2025  Date of next appointment:  2025  Best number to reach patient:  [phone]

## 2025-07-02 ENCOUNTER — HOSPITAL ENCOUNTER (OUTPATIENT)
Dept: PAIN MEDICINE | Facility: CLINIC | Age: 60
Discharge: HOME | End: 2025-07-02
Payer: COMMERCIAL

## 2025-07-02 VITALS
TEMPERATURE: 98.6 F | OXYGEN SATURATION: 97 % | DIASTOLIC BLOOD PRESSURE: 61 MMHG | HEART RATE: 63 BPM | RESPIRATION RATE: 18 BRPM | SYSTOLIC BLOOD PRESSURE: 122 MMHG

## 2025-07-02 DIAGNOSIS — M48.061 NEURAL FORAMINAL STENOSIS OF LUMBAR SPINE: ICD-10-CM

## 2025-07-02 PROCEDURE — 7100000010 HC PHASE TWO TIME - EACH INCREMENTAL 1 MINUTE

## 2025-07-02 PROCEDURE — 62323 NJX INTERLAMINAR LMBR/SAC: CPT | Performed by: ANESTHESIOLOGY

## 2025-07-02 PROCEDURE — 7100000009 HC PHASE TWO TIME - INITIAL BASE CHARGE

## 2025-07-02 PROCEDURE — 2550000001 HC RX 255 CONTRASTS: Performed by: ANESTHESIOLOGY

## 2025-07-02 PROCEDURE — 2500000005 HC RX 250 GENERAL PHARMACY W/O HCPCS: Performed by: ANESTHESIOLOGY

## 2025-07-02 PROCEDURE — 2500000004 HC RX 250 GENERAL PHARMACY W/ HCPCS (ALT 636 FOR OP/ED): Mod: JW | Performed by: ANESTHESIOLOGY

## 2025-07-02 RX ORDER — LIDOCAINE HYDROCHLORIDE 10 MG/ML
INJECTION, SOLUTION EPIDURAL; INFILTRATION; INTRACAUDAL; PERINEURAL AS NEEDED
Status: COMPLETED | OUTPATIENT
Start: 2025-07-02 | End: 2025-07-02

## 2025-07-02 RX ORDER — METHYLPREDNISOLONE ACETATE 40 MG/ML
INJECTION, SUSPENSION INTRA-ARTICULAR; INTRALESIONAL; INTRAMUSCULAR; SOFT TISSUE AS NEEDED
Status: COMPLETED | OUTPATIENT
Start: 2025-07-02 | End: 2025-07-02

## 2025-07-02 RX ORDER — SODIUM CHLORIDE 9 MG/ML
INJECTION, SOLUTION INTRAMUSCULAR; INTRAVENOUS; SUBCUTANEOUS AS NEEDED
Status: COMPLETED | OUTPATIENT
Start: 2025-07-02 | End: 2025-07-02

## 2025-07-02 RX ADMIN — SODIUM CHLORIDE 10 ML: 9 INJECTION, SOLUTION INTRAMUSCULAR; INTRAVENOUS; SUBCUTANEOUS at 13:32

## 2025-07-02 RX ADMIN — METHYLPREDNISOLONE ACETATE 40 MG: 40 INJECTION, SUSPENSION INTRA-ARTICULAR; INTRALESIONAL; INTRAMUSCULAR; INTRASYNOVIAL; SOFT TISSUE at 13:32

## 2025-07-02 RX ADMIN — IOHEXOL 3 ML: 300 INJECTION, SOLUTION INTRAVENOUS at 13:32

## 2025-07-02 RX ADMIN — LIDOCAINE HYDROCHLORIDE 10 ML: 10 INJECTION, SOLUTION EPIDURAL; INFILTRATION; INTRACAUDAL; PERINEURAL at 13:31

## 2025-07-02 ASSESSMENT — PAIN - FUNCTIONAL ASSESSMENT
PAIN_FUNCTIONAL_ASSESSMENT: 0-10
PAIN_FUNCTIONAL_ASSESSMENT: 0-10

## 2025-07-02 ASSESSMENT — PAIN SCALES - GENERAL
PAINLEVEL_OUTOF10: 6
PAINLEVEL_OUTOF10: 2

## 2025-07-02 ASSESSMENT — COLUMBIA-SUICIDE SEVERITY RATING SCALE - C-SSRS
1. IN THE PAST MONTH, HAVE YOU WISHED YOU WERE DEAD OR WISHED YOU COULD GO TO SLEEP AND NOT WAKE UP?: NO
2. HAVE YOU ACTUALLY HAD ANY THOUGHTS OF KILLING YOURSELF?: NO
6. HAVE YOU EVER DONE ANYTHING, STARTED TO DO ANYTHING, OR PREPARED TO DO ANYTHING TO END YOUR LIFE?: NO

## 2025-07-02 ASSESSMENT — PAIN DESCRIPTION - DESCRIPTORS
DESCRIPTORS: ACHING
DESCRIPTORS: ACHING

## 2025-07-08 ENCOUNTER — TELEPHONE (OUTPATIENT)
Dept: PRIMARY CARE | Facility: CLINIC | Age: 60
End: 2025-07-08
Payer: COMMERCIAL

## 2025-07-08 DIAGNOSIS — F98.8 ADD (ATTENTION DEFICIT DISORDER) WITHOUT HYPERACTIVITY: ICD-10-CM

## 2025-07-08 DIAGNOSIS — M51.26 HERNIATED LUMBAR INTERVERTEBRAL DISC: ICD-10-CM

## 2025-07-08 RX ORDER — COVID-19 VACCINE, MRNA 0.04 MG/.418ML
INJECTION, SUSPENSION INTRAMUSCULAR
COMMUNITY
Start: 2024-11-29

## 2025-07-08 RX ORDER — INFLUENZA VIRUS VACCINE 15; 15; 15 UG/.5ML; UG/.5ML; UG/.5ML
SUSPENSION INTRAMUSCULAR
COMMUNITY
Start: 2024-11-29

## 2025-07-08 RX ORDER — GABAPENTIN 300 MG/1
300 CAPSULE ORAL NIGHTLY
Qty: 90 CAPSULE | Refills: 1 | Status: SHIPPED | OUTPATIENT
Start: 2025-07-08

## 2025-07-08 RX ORDER — AMOXICILLIN 875 MG/1
1 TABLET, COATED ORAL
COMMUNITY
Start: 2024-10-22

## 2025-07-08 RX ORDER — LISDEXAMFETAMINE DIMESYLATE 30 MG/1
30 CAPSULE ORAL EVERY MORNING
Qty: 30 CAPSULE | Refills: 0 | Status: SHIPPED | OUTPATIENT
Start: 2025-07-08 | End: 2025-08-07

## 2025-07-21 ENCOUNTER — APPOINTMENT (OUTPATIENT)
Dept: PAIN MEDICINE | Facility: CLINIC | Age: 60
End: 2025-07-21
Payer: COMMERCIAL

## 2025-07-21 VITALS
BODY MASS INDEX: 28.77 KG/M2 | OXYGEN SATURATION: 95 % | DIASTOLIC BLOOD PRESSURE: 59 MMHG | HEIGHT: 66 IN | SYSTOLIC BLOOD PRESSURE: 112 MMHG | TEMPERATURE: 97 F | RESPIRATION RATE: 18 BRPM | HEART RATE: 60 BPM | WEIGHT: 179 LBS

## 2025-07-21 DIAGNOSIS — M47.816 LUMBAR SPONDYLOSIS: Primary | ICD-10-CM

## 2025-07-21 PROCEDURE — 99213 OFFICE O/P EST LOW 20 MIN: CPT | Performed by: ANESTHESIOLOGY

## 2025-07-21 PROCEDURE — 1036F TOBACCO NON-USER: CPT | Performed by: ANESTHESIOLOGY

## 2025-07-21 PROCEDURE — 3008F BODY MASS INDEX DOCD: CPT | Performed by: ANESTHESIOLOGY

## 2025-07-21 RX ORDER — CELECOXIB 200 MG/1
200 CAPSULE ORAL DAILY
Qty: 30 CAPSULE | Refills: 0 | Status: SHIPPED | OUTPATIENT
Start: 2025-07-21 | End: 2025-08-20

## 2025-07-21 ASSESSMENT — PAIN SCALES - GENERAL
PAINLEVEL_OUTOF10: 7
PAINLEVEL_OUTOF10: 7

## 2025-07-21 ASSESSMENT — ENCOUNTER SYMPTOMS
WEAKNESS: 0
SHORTNESS OF BREATH: 0
OCCASIONAL FEELINGS OF UNSTEADINESS: 0
BACK PAIN: 1
LOSS OF SENSATION IN FEET: 0
DEPRESSION: 0
CONSTIPATION: 0

## 2025-07-21 ASSESSMENT — PAIN - FUNCTIONAL ASSESSMENT: PAIN_FUNCTIONAL_ASSESSMENT: 0-10

## 2025-07-21 ASSESSMENT — PAIN DESCRIPTION - DESCRIPTORS: DESCRIPTORS: ACHING;DULL

## 2025-07-21 NOTE — PROGRESS NOTES
"  Chief Complain  Follow-up (For LESI on 7/2, with 10% relief. Pain is in low back along belt line.)       History Of Present Illness  Sivakumar Hall \"Lizette" is a 60 y.o. male here for axial low back pain. The patient rates the pain at 7  on a scale from 0-10.  The patient describes pain as pinching.  The pain is worsened by bending forward and standing and is alleviated by nothing relieves the pain.  Since the last visit the pain has stayed the same.  The patient denies any fever, chills, weight loss, bladder/bowel incontinence.       Past Medical History  He has a past medical history of ADD (attention deficit disorder) (2000), Cervical disc disease (2001), Cervical disc disorder (04/2022), Cervical radiculopathy, Hyperlipidemia, Joint pain, Low back pain (2004), Lumbosacral disc disease (2004), Neck pain (2004), Spinal stenosis (2010), and Vision loss.    He has no past medical history of Personal history of irradiation.    Surgical History  He has a past surgical history that includes Cervical fusion (2003); Anterior cervical discectomy w/ fusion (2003); Microdiscectomy lumbar; Back surgery (2012); Eye surgery (2023); Spine surgery (2003); Adenoidectomy (1977); and Tonsillectomy (1977).     Social History  He reports that he has never smoked. He has never used smokeless tobacco. He reports current alcohol use. He reports that he does not use drugs.    Family History  Family History   Problem Relation Name Age of Onset    Prostate cancer Father Rocco Hall     Cancer Father Rocco Hall         Allergies  Patient has no known allergies.    Review of Systems  Review of Systems   Respiratory:  Negative for shortness of breath.    Cardiovascular:  Negative for chest pain.   Gastrointestinal:  Negative for constipation.   Musculoskeletal:  Positive for back pain.   Neurological:  Negative for weakness.   Psychiatric/Behavioral:  Negative for suicidal ideas.         Physical Exam  Physical Exam  HENT:      Head: " "Normocephalic and atraumatic.     Eyes:      Extraocular Movements: Extraocular movements intact.      Pupils: Pupils are equal, round, and reactive to light.     Pulmonary:      Effort: Pulmonary effort is normal.     Musculoskeletal:      Cervical back: Neck supple.     Neurological:      Mental Status: He is alert and oriented to person, place, and time.      Motor: Motor function is intact.      Deep Tendon Reflexes:      Reflex Scores:       Patellar reflexes are 1+ on the right side and 1+ on the left side.       Achilles reflexes are 1+ on the right side.    Psychiatric:         Mood and Affect: Mood normal.         Behavior: Behavior normal.           Last Recorded Vitals  Blood pressure 112/59, pulse 60, temperature 36.1 °C (97 °F), resp. rate 18, height 1.676 m (5' 6\"), weight 81.2 kg (179 lb), SpO2 95%.    Reviewed Images  Reviewed and independently interpreted MRI Lumbar spine degenerative changes at L4-5, L5-S1, severe neuroforaminal stenosis bilateral L5-S1, type I Modic changes at L5-S1    Reviewed Labs   Latest Reference Range & Units 05/20/25 09:47   WHITE BLOOD CELL COUNT 3.8 - 10.8 Thousand/uL 5.2   RED BLOOD CELL COUNT 4.20 - 5.80 Million/uL 5.01   HEMOGLOBIN 13.2 - 17.1 g/dL 15.6   HEMATOCRIT 38.5 - 50.0 % 49.5   MCV 80.0 - 100.0 fL 98.8   MCH 27.0 - 33.0 pg 31.1   MCHC 32.0 - 36.0 g/dL 31.5 (L)   RDW 11.0 - 15.0 % 12.5   PLATELET COUNT 140 - 400 Thousand/uL 186   MPV 7.5 - 12.5 fL 11.4   (L): Data is abnormally low   Latest Reference Range & Units 05/20/25 09:47   BILIRUBIN, TOTAL 0.2 - 1.2 mg/dL 0.6   CHOLESTEROL, TOTAL <200 mg/dL 188   HDL CHOLESTEROL > OR = 40 mg/dL 64   CHOL/HDLC RATIO <5.0 (calc) 2.9   LDL-CHOLESTEROL mg/dL (calc) 105 (H)   TRIGLYCERIDES <150 mg/dL 101   NON HDL CHOLESTEROL <130 mg/dL (calc) 124   (H): Data is abnormally high     Assessment/Plan     Sivakumar Hall \"Deshaun\" is a 60 y.o. male here for chronic low back pain.  Recently had cervical fusion reports improvement in " the neck pain.  Currently most bothered by his low back pain.  Reviewed recently done MRI of his lumbar spine does reveal type I Modic changes at L4-5, L5-S1, disc degenerative changes as well as severe neuroforaminal narrowing bilateral L5-S1, as well as lower lumbar facet arthrosis.  Last visit we did an epidural steroid injection he has not received any significant benefit from that injection thus far.  On physical examination does have tenderness over lower lumbar paraspinous area as well as increased pain with extension.  I would recommend trial of Celebrex, if he does not respond consider doing lumbar medial branch block at L3, 4, 5 under fluoroscopic guidance for diagnostic purposes.    Bay Tripp MD

## 2025-08-04 DIAGNOSIS — E78.5 HYPERLIPIDEMIA, UNSPECIFIED HYPERLIPIDEMIA TYPE: ICD-10-CM

## 2025-08-04 RX ORDER — ROSUVASTATIN CALCIUM 10 MG/1
10 TABLET, COATED ORAL DAILY
Qty: 90 TABLET | Refills: 0 | Status: SHIPPED | OUTPATIENT
Start: 2025-08-04

## 2025-08-13 ENCOUNTER — APPOINTMENT (OUTPATIENT)
Dept: PAIN MEDICINE | Facility: CLINIC | Age: 60
End: 2025-08-13
Payer: COMMERCIAL

## 2025-08-15 ENCOUNTER — TELEPHONE (OUTPATIENT)
Dept: PRIMARY CARE | Facility: CLINIC | Age: 60
End: 2025-08-15
Payer: COMMERCIAL

## 2025-08-18 DIAGNOSIS — F98.8 ADD (ATTENTION DEFICIT DISORDER) WITHOUT HYPERACTIVITY: ICD-10-CM

## 2025-08-18 RX ORDER — LISDEXAMFETAMINE DIMESYLATE 30 MG/1
30 CAPSULE ORAL EVERY MORNING
Qty: 30 CAPSULE | Refills: 0 | Status: SHIPPED | OUTPATIENT
Start: 2025-08-18 | End: 2025-09-17

## 2025-08-26 ENCOUNTER — APPOINTMENT (OUTPATIENT)
Dept: PRIMARY CARE | Facility: CLINIC | Age: 60
End: 2025-08-26
Payer: COMMERCIAL

## 2025-08-26 VITALS
SYSTOLIC BLOOD PRESSURE: 119 MMHG | HEIGHT: 66 IN | HEART RATE: 57 BPM | BODY MASS INDEX: 28.42 KG/M2 | WEIGHT: 176.8 LBS | DIASTOLIC BLOOD PRESSURE: 65 MMHG | TEMPERATURE: 98.6 F | OXYGEN SATURATION: 96 %

## 2025-08-26 DIAGNOSIS — F98.8 ADD (ATTENTION DEFICIT DISORDER) WITHOUT HYPERACTIVITY: ICD-10-CM

## 2025-08-26 PROCEDURE — 1036F TOBACCO NON-USER: CPT | Performed by: FAMILY MEDICINE

## 2025-08-26 PROCEDURE — 3008F BODY MASS INDEX DOCD: CPT | Performed by: FAMILY MEDICINE

## 2025-08-26 PROCEDURE — 99213 OFFICE O/P EST LOW 20 MIN: CPT | Performed by: FAMILY MEDICINE

## 2025-08-26 RX ORDER — DESONIDE 0.5 MG/G
CREAM TOPICAL
COMMUNITY
Start: 2025-08-13

## 2025-08-26 RX ORDER — LISDEXAMFETAMINE DIMESYLATE 30 MG/1
30 CAPSULE ORAL EVERY MORNING
Qty: 30 CAPSULE | Refills: 0 | Status: SHIPPED | OUTPATIENT
Start: 2025-09-16 | End: 2025-10-16

## 2025-08-26 ASSESSMENT — PAIN SCALES - GENERAL: PAINLEVEL_OUTOF10: 0-NO PAIN

## 2025-08-26 ASSESSMENT — PATIENT HEALTH QUESTIONNAIRE - PHQ9
SUM OF ALL RESPONSES TO PHQ9 QUESTIONS 1 AND 2: 0
2. FEELING DOWN, DEPRESSED OR HOPELESS: NOT AT ALL
1. LITTLE INTEREST OR PLEASURE IN DOING THINGS: NOT AT ALL

## 2025-08-26 ASSESSMENT — ENCOUNTER SYMPTOMS
DEPRESSION: 0
CONSTITUTIONAL NEGATIVE: 1
OCCASIONAL FEELINGS OF UNSTEADINESS: 0
LOSS OF SENSATION IN FEET: 0

## 2025-11-11 ENCOUNTER — APPOINTMENT (OUTPATIENT)
Dept: PRIMARY CARE | Facility: CLINIC | Age: 60
End: 2025-11-11
Payer: COMMERCIAL

## 2026-05-21 ENCOUNTER — APPOINTMENT (OUTPATIENT)
Dept: PRIMARY CARE | Facility: CLINIC | Age: 61
End: 2026-05-21
Payer: COMMERCIAL

## (undated) DEVICE — RESERVOIR, WOUND, W/TROCAR, PVC, MEDIUM, 400CC, DAVOL, 1/8 IN, 10FR

## (undated) DEVICE — DRAPE, LAPAROTOMY II, PEDIATRIC

## (undated) DEVICE — SUTURE, VICRYL, 3-0,18 IN, SH, UNDYED

## (undated) DEVICE — APPLIER,  LIGACLIP MULTI CLIP, 30 MED 11 1/2

## (undated) DEVICE — Device

## (undated) DEVICE — ADHESIVE, SKIN, DERMABOND ADVANCED, 15CM, PEN-STYLE

## (undated) DEVICE — SUTURE, SILK, 2-0, 18 IN, FSL, BLACK

## (undated) DEVICE — SEALANT, HEMOSTATIC, FLOSEAL, 10 ML

## (undated) DEVICE — BUR, 3MM X 3.8MM, PRECISION, NEURO DRILL

## (undated) DEVICE — DISTRACTION PIN, 14MM, STERILE

## (undated) DEVICE — SUTURE, MONOCRYL, 4-0, 27 IN, PS-2, UNDYED

## (undated) DEVICE — SUTURE, STRATAFIX, SPIRAL MONOCRYL PLUS, 3-0, PS-2 45CM, UNDYED

## (undated) DEVICE — GLOVE, SURGICAL, PROTEXIS PI MICRO, 8.5, PF, LF

## (undated) DEVICE — TOWEL PACK, STERILE, 4/PACK, BLUE

## (undated) DEVICE — ELECTRODE, ELECTROSURGICAL, BLADE EXT 4 INCH, INSULATED

## (undated) DEVICE — DRAPE, PAD, INSTRUMENT, MAGNETIC, MEDIUM, 10 X 16 IN, DISPOSABLE

## (undated) DEVICE — DRAPE, INSTRUMENT, W/POUCH, STERI DRAPE, 7 X 11 IN, DISPOSABLE, STERILE

## (undated) DEVICE — DRAPE, MICROSCOPE, W/REMOVABLE LENS